# Patient Record
Sex: FEMALE | Race: WHITE | NOT HISPANIC OR LATINO | Employment: OTHER | URBAN - METROPOLITAN AREA
[De-identification: names, ages, dates, MRNs, and addresses within clinical notes are randomized per-mention and may not be internally consistent; named-entity substitution may affect disease eponyms.]

---

## 2018-08-21 ENCOUNTER — PROCEDURE VISIT (OUTPATIENT)
Dept: OBSTETRICS AND GYNECOLOGY | Facility: CLINIC | Age: 33
End: 2018-08-21

## 2018-08-21 ENCOUNTER — INITIAL PRENATAL (OUTPATIENT)
Dept: OBSTETRICS AND GYNECOLOGY | Facility: CLINIC | Age: 33
End: 2018-08-21

## 2018-08-21 VITALS
WEIGHT: 240 LBS | BODY MASS INDEX: 40.97 KG/M2 | HEIGHT: 64 IN | SYSTOLIC BLOOD PRESSURE: 173 MMHG | DIASTOLIC BLOOD PRESSURE: 108 MMHG

## 2018-08-21 DIAGNOSIS — R73.09 ELEVATED HEMOGLOBIN A1C: ICD-10-CM

## 2018-08-21 DIAGNOSIS — Z86.32 HISTORY OF GESTATIONAL DIABETES IN PRIOR PREGNANCY, CURRENTLY PREGNANT IN FIRST TRIMESTER: ICD-10-CM

## 2018-08-21 DIAGNOSIS — O10.919 CHRONIC HYPERTENSION IN PREGNANCY: ICD-10-CM

## 2018-08-21 DIAGNOSIS — Z11.3 SCREEN FOR STD (SEXUALLY TRANSMITTED DISEASE): ICD-10-CM

## 2018-08-21 DIAGNOSIS — Z34.91 PREGNANCY WITH UNCERTAIN DATES IN FIRST TRIMESTER: Primary | ICD-10-CM

## 2018-08-21 DIAGNOSIS — O34.219 PREVIOUS CESAREAN DELIVERY, ANTEPARTUM: ICD-10-CM

## 2018-08-21 DIAGNOSIS — O20.9 VAGINAL BLEEDING IN PREGNANCY, FIRST TRIMESTER: ICD-10-CM

## 2018-08-21 DIAGNOSIS — O09.291 HISTORY OF GESTATIONAL DIABETES IN PRIOR PREGNANCY, CURRENTLY PREGNANT IN FIRST TRIMESTER: ICD-10-CM

## 2018-08-21 DIAGNOSIS — Z13.71 SCREENING FOR GENETIC DISEASE CARRIER STATUS: ICD-10-CM

## 2018-08-21 DIAGNOSIS — O99.211 OBESITY AFFECTING PREGNANCY IN FIRST TRIMESTER: ICD-10-CM

## 2018-08-21 DIAGNOSIS — O24.011 PRE-EXISTING TYPE 1 DIABETES MELLITUS DURING PREGNANCY IN FIRST TRIMESTER: ICD-10-CM

## 2018-08-21 DIAGNOSIS — O99.210 OBESITY IN PREGNANCY: ICD-10-CM

## 2018-08-21 DIAGNOSIS — Z34.81 ENCOUNTER FOR SUPERVISION OF OTHER NORMAL PREGNANCY IN FIRST TRIMESTER: Primary | ICD-10-CM

## 2018-08-21 DIAGNOSIS — Z12.4 SCREENING FOR CERVICAL CANCER: ICD-10-CM

## 2018-08-21 PROCEDURE — 76817 TRANSVAGINAL US OBSTETRIC: CPT | Performed by: OBSTETRICS & GYNECOLOGY

## 2018-08-21 PROCEDURE — 99215 OFFICE O/P EST HI 40 MIN: CPT | Performed by: OBSTETRICS & GYNECOLOGY

## 2018-08-21 RX ORDER — NIFEDIPINE 60 MG/1
60 TABLET, EXTENDED RELEASE ORAL DAILY
Qty: 30 TABLET | Refills: 8 | Status: SHIPPED | OUTPATIENT
Start: 2018-08-21 | End: 2018-08-31 | Stop reason: SDUPTHER

## 2018-08-21 NOTE — PROGRESS NOTES
Initial ob visit     CC- Here for care of pregnancy     Fany Brunson is being seen today for her first obstetrical visit.  She is a 33 y.o.    7w2d gestation.     #: 1, Date: 06, Sex: Male, Weight: 4252 g (9 lb 6 oz), GA: 37w0d, Delivery: , Unspecified, Apgar1: None, Apgar5: None, Living: Living, Birth Comments: None    #: 2, Date: 08, Sex: Male, Weight: 4111 g (9 lb 1 oz), GA: 39w0d, Delivery: , Unspecified, Apgar1: None, Apgar5: None, Living: Living, Birth Comments: None    #: 3, Date: 09, Sex: Male, Weight: 3799 g (8 lb 6 oz), GA: 39w0d, Delivery: , Unspecified, Apgar1: None, Apgar5: None, Living: Living, Birth Comments: None    #: 4, Date: 11, Sex: Male, Weight: 3657 g (8 lb 1 oz), GA: 39w0d, Delivery: , Unspecified, Apgar1: None, Apgar5: None, Living: Living, Birth Comments: None    #: 5, Date: 10/15/15, Sex: Male, Weight: 3402 g (7 lb 8 oz), GA: 39w0d, Delivery: , Unspecified, Apgar1: None, Apgar5: None, Living: Living, Birth Comments: None    #: 6, Date: None, Sex: None, Weight: None, GA: None, Delivery: None, Apgar1: None, Apgar5: None, Living: None, Birth Comments: None      Current obstetric complaints : bleeding   Prior obstetric issues, potential pregnancy concerns:  x 5, HTN, GDM with last pregnancy  Family history of genetic issues (includes FOB): none  Prior infections concerning in pregnancy (Rash, fever in last 2 weeks): none  Varicella Hx -positive history  Prior testing for Cystic Fibrosis Carrier or Sickle Cell Trait- unknown status   Prepregnancy BMI - Body mass index is 41.2 kg/m².    Past Medical History:   Diagnosis Date   • Gestational diabetes    • Hypertension    • Varicella        Past Surgical History:   Procedure Laterality Date   •  SECTION     • CHOLECYSTECTOMY     • WRIST SURGERY         No current outpatient prescriptions on file.    Allergies   Allergen Reactions   • Latex Rash  "      Social History     Social History   • Marital status:      Spouse name: N/A   • Number of children: N/A   • Years of education: N/A     Occupational History   • Not on file.     Social History Main Topics   • Smoking status: Former Smoker     Types: Cigarettes   • Smokeless tobacco: Never Used   • Alcohol use No   • Drug use: No   • Sexual activity: Yes     Partners: Male     Other Topics Concern   • Not on file     Social History Narrative   • No narrative on file       Family History   Problem Relation Age of Onset   • Breast cancer Neg Hx    • Ovarian cancer Neg Hx    • Uterine cancer Neg Hx    • Colon cancer Neg Hx    • Deep vein thrombosis Neg Hx    • Pulmonary embolism Neg Hx        Review of systems     A comprehensive review of systems was negative.     Objective    BP (!) 173/108   Ht 162.6 cm (64\")   Wt 109 kg (240 lb)   LMP 06/23/2018 (Exact Date)   BMI 41.20 kg/m²       General Appearance:    Alert, cooperative, in no acute distress, habitus obese   Head:    Normocephalic, without obvious abnormality, atraumatic   Eyes:            Lids and lashes normal, conjunctivae and sclerae normal, no   icterus, no pallor, corneas clear   Ears:    Ears appear intact with no abnormalities noted       Neck:   No adenopathy, supple, trachea midline, no thyromegaly   Back:     No kyphosis present, no scoliosis present,                       Lungs:     Clear to auscultation,respirations regular, even and                   unlabored    Heart:    Regular rhythm and normal rate, normal S1 and S2, no            murmur, no gallop, no rub, no click   Breast Exam:    No masses, No nipple discharge   Abdomen:     Normal bowel sounds, no masses, no organomegaly, soft        non-tender, non-distended, no guarding, no rebound                 tenderness   Genitalia:    Vulva - BUS-WNL, NEFG    Vagina - No discharge, No bleeding    Cervix - No Lesions, closed     Uterus - Consistent with 8 weeks    Adnexa - No aristides, " NT    Pelvimetry - clinically adequate, gynecoid pelvis     Extremities:   Moves all extremities well, no edema, no cyanosis, no              redness   Pulses:   Pulses palpable and equal bilaterally   Skin:   No bleeding, bruising or rash   Lymph nodes:   No palpable adenopathy   Neurologic:   Sensation intact, A&O times 3      Assessment    1) Pregnancy at 8w3d  2) Hx of PIH -   See next   3) HTN, essential in pregnancy   Not on medication   Takes BP at home, has been up for awhile.   Check baseline levels of CMP/24 hour urine  Start anti-hypertensive medication   Procardia XL 60 mg per day (worked for her in the past)   Check BP and report if persistent > 150/95 to look at adjustment of dose/medications   Start ASA 81 mg 12-36 weeks   Good dating by today's scan in case need to intervene   4) Hx of Gest DM   Check Hg A1c, monitor   5) Prior    Repeat for delivery discussed  6) Obesity in pregnancy   Discussed weight gain of 10-15 # max (all in second half)   8) Vaginal bleeding in pregnancy   Rh+ (A+ in prior preg), normal ultrasound.   Bleeding precautions reviewed.        Plan    Initial labs drawn, GC/CHL screen done  Patient is on Prenatal vitamins  Problem list reviewed and updated.  Reviewed routine prenatal care with the office to include but not limited to   Zika (travel restrictions/ok to use insect repellant), not to changing cat litter, food restrictions, avoidance of alcohol, tobacco and drugs and saunas/hot tubs.   All questions answered.     Salvador Carson MD   2018  10:25 AM

## 2018-08-22 ENCOUNTER — TELEPHONE (OUTPATIENT)
Dept: OBSTETRICS AND GYNECOLOGY | Facility: CLINIC | Age: 33
End: 2018-08-22

## 2018-08-22 LAB
ABO GROUP BLD: (no result)
ALBUMIN SERPL-MCNC: 4.1 G/DL (ref 3.5–5.5)
ALBUMIN/GLOB SERPL: 1.5 {RATIO} (ref 1.2–2.2)
ALP SERPL-CCNC: 103 IU/L (ref 39–117)
ALT SERPL-CCNC: 11 IU/L (ref 0–32)
AST SERPL-CCNC: 12 IU/L (ref 0–40)
BASOPHILS # BLD AUTO: 0.1 X10E3/UL (ref 0–0.2)
BASOPHILS NFR BLD AUTO: 1 %
BILIRUB SERPL-MCNC: 0.2 MG/DL (ref 0–1.2)
BLD GP AB SCN SERPL QL: NEGATIVE
BUN SERPL-MCNC: 9 MG/DL (ref 6–20)
BUN/CREAT SERPL: 12 (ref 9–23)
CALCIUM SERPL-MCNC: 9.6 MG/DL (ref 8.7–10.2)
CHLORIDE SERPL-SCNC: 101 MMOL/L (ref 96–106)
CO2 SERPL-SCNC: 22 MMOL/L (ref 20–29)
CREAT SERPL-MCNC: 0.73 MG/DL (ref 0.57–1)
EOSINOPHIL # BLD AUTO: 0.3 X10E3/UL (ref 0–0.4)
EOSINOPHIL NFR BLD AUTO: 2 %
ERYTHROCYTE [DISTWIDTH] IN BLOOD BY AUTOMATED COUNT: 16.3 % (ref 12.3–15.4)
GLOBULIN SER CALC-MCNC: 2.7 G/DL (ref 1.5–4.5)
GLUCOSE SERPL-MCNC: 102 MG/DL (ref 65–99)
HBA1C MFR BLD: 5.7 % (ref 4.8–5.6)
HBV SURFACE AG SERPL QL IA: NEGATIVE
HCT VFR BLD AUTO: 41.6 % (ref 34–46.6)
HCV AB S/CO SERPL IA: <0.1 S/CO RATIO (ref 0–0.9)
HGB BLD-MCNC: 13.9 G/DL (ref 11.1–15.9)
HIV 1+2 AB+HIV1 P24 AG SERPL QL IA: NON REACTIVE
IMM GRANULOCYTES # BLD: 0 X10E3/UL (ref 0–0.1)
IMM GRANULOCYTES NFR BLD: 0 %
LYMPHOCYTES # BLD AUTO: 2.1 X10E3/UL (ref 0.7–3.1)
LYMPHOCYTES NFR BLD AUTO: 19 %
MCH RBC QN AUTO: 25.9 PG (ref 26.6–33)
MCHC RBC AUTO-ENTMCNC: 33.4 G/DL (ref 31.5–35.7)
MCV RBC AUTO: 78 FL (ref 79–97)
MONOCYTES # BLD AUTO: 0.5 X10E3/UL (ref 0.1–0.9)
MONOCYTES NFR BLD AUTO: 4 %
NEUTROPHILS # BLD AUTO: 8 X10E3/UL (ref 1.4–7)
NEUTROPHILS NFR BLD AUTO: 74 %
PLATELET # BLD AUTO: 290 X10E3/UL (ref 150–379)
POTASSIUM SERPL-SCNC: 3.9 MMOL/L (ref 3.5–5.2)
PROT SERPL-MCNC: 6.8 G/DL (ref 6–8.5)
RBC # BLD AUTO: 5.36 X10E6/UL (ref 3.77–5.28)
RH BLD: POSITIVE
RPR SER QL: NON REACTIVE
RUBV IGG SERPL IA-ACNC: 4.05 INDEX
SODIUM SERPL-SCNC: 142 MMOL/L (ref 134–144)
WBC # BLD AUTO: 10.9 X10E3/UL (ref 3.4–10.8)

## 2018-08-22 NOTE — TELEPHONE ENCOUNTER
----- Message from Salvador Carson MD sent at 8/22/2018 12:32 PM EDT -----  Hilary, I would go ahead and do an initial 3 hour since her random BS is elevated and HgA1c is above baseline to rule out active diabetes at this time. If normal will still have to do at 28 weeks. Thanks Dr. Carson

## 2018-08-23 ENCOUNTER — TELEPHONE (OUTPATIENT)
Dept: OBSTETRICS AND GYNECOLOGY | Facility: CLINIC | Age: 33
End: 2018-08-23

## 2018-08-23 LAB
BACTERIA UR CULT: ABNORMAL
BACTERIA UR CULT: ABNORMAL
CYTOLOGIST CVX/VAG CYTO: NORMAL
CYTOLOGY CVX/VAG DOC THIN PREP: NORMAL
DX ICD CODE: NORMAL
HIV 1 & 2 AB SER-IMP: NORMAL
HPV I/H RISK 1 DNA CVX QL PROBE+SIG AMP: NEGATIVE
OTHER ANTIBIOTIC SUSC ISLT: ABNORMAL
OTHER STN SPEC: NORMAL
PATH REPORT.FINAL DX SPEC: NORMAL
STAT OF ADQ CVX/VAG CYTO-IMP: NORMAL

## 2018-08-23 RX ORDER — NITROFURANTOIN 25; 75 MG/1; MG/1
100 CAPSULE ORAL 2 TIMES DAILY
Qty: 14 CAPSULE | Refills: 0 | Status: SHIPPED | OUTPATIENT
Start: 2018-08-23 | End: 2018-08-31 | Stop reason: SDUPTHER

## 2018-08-23 NOTE — TELEPHONE ENCOUNTER
----- Message from Salvador Carson MD sent at 8/23/2018  9:27 AM EDT -----  Hilary, she has UTI - I sent in macrobid to treat. Await sensitivities, but should start treatment now. Thanks Dr. Carson

## 2018-08-24 ENCOUNTER — TELEPHONE (OUTPATIENT)
Dept: OBSTETRICS AND GYNECOLOGY | Facility: CLINIC | Age: 33
End: 2018-08-24

## 2018-08-24 LAB
C TRACH RRNA SPEC QL NAA+PROBE: NEGATIVE
GLUCOSE 1H P 100 G GLC PO SERPL-MCNC: 199 MG/DL (ref 40–300)
GLUCOSE 2H P 100 G GLC PO SERPL-MCNC: 155 MG/DL (ref 40–300)
GLUCOSE 3H P 100 G GLC PO SERPL-MCNC: 188 MG/DL (ref 40–300)
GLUCOSE P FAST SERPL-MCNC: 102 MG/DL
HBA1C MFR BLD: 5.8 % (ref 4.8–5.6)
N GONORRHOEA RRNA SPEC QL NAA+PROBE: NEGATIVE
PROT 24H UR-MRATE: 308 MG/24HOURS (ref 0–150)
PROT UR-MCNC: 11 MG/DL
T VAGINALIS RRNA SPEC QL NAA+PROBE: POSITIVE

## 2018-08-24 RX ORDER — LANCETS 30 GAUGE
1 EACH MISCELLANEOUS 4 TIMES DAILY
Qty: 120 EACH | Refills: 3 | Status: SHIPPED | OUTPATIENT
Start: 2018-08-24 | End: 2018-12-02 | Stop reason: SDUPTHER

## 2018-08-24 RX ORDER — METRONIDAZOLE 500 MG/1
2000 TABLET ORAL ONCE
Qty: 4 TABLET | Refills: 1 | Status: SHIPPED | OUTPATIENT
Start: 2018-08-24 | End: 2018-08-24

## 2018-08-24 RX ORDER — GLUCOSAMINE HCL/CHONDROITIN SU 500-400 MG
1 CAPSULE ORAL 4 TIMES DAILY
Qty: 120 EACH | Refills: 3 | Status: SHIPPED | OUTPATIENT
Start: 2018-08-24 | End: 2018-12-02 | Stop reason: SDUPTHER

## 2018-08-24 RX ORDER — BLOOD-GLUCOSE METER
1 KIT MISCELLANEOUS 4 TIMES DAILY
Qty: 1 EACH | Refills: 0 | Status: SHIPPED | OUTPATIENT
Start: 2018-08-24 | End: 2019-03-28

## 2018-08-24 NOTE — TELEPHONE ENCOUNTER
Called and left message to return my call.   Several issues.   1) Trichomoniasis  2) UTI on Macrobid - is sensitive to this  3) Diabetic (failed 3 hour), need to start testing BS and get dietary counseling     -   Salvador Carson MD  8/24/2018  9:43 AM

## 2018-08-27 ENCOUNTER — TELEPHONE (OUTPATIENT)
Dept: OBSTETRICS AND GYNECOLOGY | Facility: CLINIC | Age: 33
End: 2018-08-27

## 2018-08-27 NOTE — TELEPHONE ENCOUNTER
----- Message from Salvador Carson MD sent at 8/24/2018 11:47 AM EDT -----  Hilary aware 1) trichomoniasis - will treat, needs to have partner treated. 2) UTI is sensitive to Macrobid 3) Abnormal 3 hour, start treatment as if diabetic. Supplies sent in, monitor BS fasting and 2 hours after meals, dietary education and follow up next week to look at initial results. Thanks Dr. Carson

## 2018-08-28 ENCOUNTER — HOSPITAL ENCOUNTER (OUTPATIENT)
Dept: DIABETES SERVICES | Facility: HOSPITAL | Age: 33
Discharge: HOME | End: 2018-08-28
Attending: OBSTETRICS & GYNECOLOGY | Admitting: OBSTETRICS & GYNECOLOGY

## 2018-08-28 LAB
CFTR MUT ANL BLD/T: NORMAL
LABORATORY COMMENT REPORT: NORMAL

## 2018-08-28 PROCEDURE — G0109 DIAB MANAGE TRN IND/GROUP: HCPCS

## 2018-08-30 LAB
ANNOTATION COMMENT IMP: NORMAL
ANNOTATION COMMENT IMP: NORMAL
CARRIER DETECTION RATE:: NORMAL
ETHNIC BACKGROUND STATED: NORMAL
GEN KNWLDGE REF ID: NORMAL
GENETIC COUNSELOR:: NORMAL
PATHOLOGIST NAME: NORMAL
REASON FOR REFERRAL (NARRATIVE): NORMAL
REF LAB TEST METHOD: NORMAL
SMN1 GENE MUT ANL BLD/T: NORMAL
SMN1 GENE MUT ANL BLD/T: NORMAL
SPEC CONTAINER SPEC: NORMAL
SPECIMEN SOURCE: NORMAL
TEST PERFORMANCE INFO SPEC: NORMAL

## 2018-08-31 ENCOUNTER — ROUTINE PRENATAL (OUTPATIENT)
Dept: OBSTETRICS AND GYNECOLOGY | Facility: CLINIC | Age: 33
End: 2018-08-31

## 2018-08-31 VITALS — WEIGHT: 239 LBS | DIASTOLIC BLOOD PRESSURE: 96 MMHG | BODY MASS INDEX: 41.02 KG/M2 | SYSTOLIC BLOOD PRESSURE: 149 MMHG

## 2018-08-31 DIAGNOSIS — O10.919 CHRONIC HYPERTENSION IN PREGNANCY: ICD-10-CM

## 2018-08-31 DIAGNOSIS — O23.41 UTI (URINARY TRACT INFECTION) DURING PREGNANCY, FIRST TRIMESTER: ICD-10-CM

## 2018-08-31 DIAGNOSIS — A59.01 TRICHOMONIASIS OF VAGINA: ICD-10-CM

## 2018-08-31 DIAGNOSIS — O09.91 HIGH-RISK PREGNANCY IN FIRST TRIMESTER: ICD-10-CM

## 2018-08-31 DIAGNOSIS — O24.311 PRE-EXISTING DIABETES MELLITUS DURING PREGNANCY IN FIRST TRIMESTER: Primary | ICD-10-CM

## 2018-08-31 PROCEDURE — 99214 OFFICE O/P EST MOD 30 MIN: CPT | Performed by: OBSTETRICS & GYNECOLOGY

## 2018-08-31 RX ORDER — NIFEDIPINE 90 MG/1
90 TABLET, EXTENDED RELEASE ORAL DAILY
Qty: 30 TABLET | Refills: 6 | Status: SHIPPED | OUTPATIENT
Start: 2018-08-31 | End: 2018-11-13

## 2018-08-31 RX ORDER — BLOOD-GLUCOSE METER
EACH MISCELLANEOUS
Refills: 0 | COMMUNITY
Start: 2018-08-24 | End: 2019-03-28

## 2018-08-31 RX ORDER — PNV NO.95/FERROUS FUM/FOLIC AC 28MG-0.8MG
1 TABLET ORAL DAILY
Refills: 11 | COMMUNITY
Start: 2018-08-21 | End: 2018-11-13

## 2018-08-31 RX ORDER — NAPROXEN SODIUM 220 MG
TABLET ORAL
Qty: 100 EACH | Refills: 5 | Status: SHIPPED | OUTPATIENT
Start: 2018-08-31 | End: 2019-02-11 | Stop reason: SDUPTHER

## 2018-08-31 RX ORDER — CHLORPHENIR/PHENYLEPH/ASPIRIN 2-7.8-325
TABLET, EFFERVESCENT ORAL
Refills: 3 | COMMUNITY
Start: 2018-08-24 | End: 2019-03-28

## 2018-09-18 ENCOUNTER — ROUTINE PRENATAL (OUTPATIENT)
Dept: OBSTETRICS AND GYNECOLOGY | Facility: CLINIC | Age: 33
End: 2018-09-18

## 2018-09-18 VITALS — SYSTOLIC BLOOD PRESSURE: 146 MMHG | DIASTOLIC BLOOD PRESSURE: 90 MMHG | WEIGHT: 239 LBS | BODY MASS INDEX: 41.02 KG/M2

## 2018-09-18 DIAGNOSIS — O09.91 HIGH-RISK PREGNANCY IN FIRST TRIMESTER: ICD-10-CM

## 2018-09-18 DIAGNOSIS — O24.311 PRE-EXISTING DIABETES MELLITUS DURING PREGNANCY IN FIRST TRIMESTER: Primary | ICD-10-CM

## 2018-09-18 DIAGNOSIS — O99.210 OBESITY IN PREGNANCY: ICD-10-CM

## 2018-09-18 DIAGNOSIS — O10.919 CHRONIC HYPERTENSION IN PREGNANCY: ICD-10-CM

## 2018-09-18 PROCEDURE — 99214 OFFICE O/P EST MOD 30 MIN: CPT | Performed by: OBSTETRICS & GYNECOLOGY

## 2018-09-18 RX ORDER — DEXTROSE 4 G
TABLET,CHEWABLE ORAL
Refills: 3 | COMMUNITY
Start: 2018-08-31 | End: 2019-03-24 | Stop reason: HOSPADM

## 2018-09-21 ENCOUNTER — TELEPHONE (OUTPATIENT)
Dept: DIABETES SERVICES | Facility: HOSPITAL | Age: 33
End: 2018-09-21

## 2018-09-21 NOTE — TELEPHONE ENCOUNTER
Speak to pt on phone to get 7 days of BG levels 9/15/18-9/21/18. Pt FBG . Pt skipped breakfast and post-breakfast BG check 5x/7. Post-lunch BGs: 109-151. Post-dinner BGs: .    Instruct pt about the rationale for eating a (small) breakfast. Request pt if she can come in the office for f/u ed. Pt is agreeable to come in on a day she has an outpt appt nearby (Tuesday.)     Insufficient data to titrate breakfast Humalog/Novolog. Only one post-lunch and two post-dinner elevation >130. Will have pt increase hs NPH 10% for >3 FBG elevations per Maternity Program Protocol (12 units to 13 units) and f/u with her next week.

## 2018-09-25 ENCOUNTER — HOSPITAL ENCOUNTER (OUTPATIENT)
Dept: DIABETES SERVICES | Facility: HOSPITAL | Age: 33
Discharge: HOME | End: 2018-09-25
Attending: OBSTETRICS & GYNECOLOGY | Admitting: OBSTETRICS & GYNECOLOGY

## 2018-09-25 PROCEDURE — G0108 DIAB MANAGE TRN  PER INDIV: HCPCS

## 2018-09-25 NOTE — NURSING NOTE
Diabetes Education  Assessment/Teaching    Patient Name:  Fany Brunson  YOB: 1985  MRN: [id number]  Admit Date:  9/25/2018      Assessment Date:  9/25/2018    Most Recent Value   General Information    Referral From:  MD sylvia Carson   Pregnancy Assessment                approx 12w   Diabetes History   Have you had diabetes education/teaching in the past?  yes   When and where was your diabetes education?  -- pt had initial ed for GDM/meal planning here at [facility].    Do you test your blood sugar at home?  yes   Frequency of checks  tid   Who performs the test?  pt   Have you had low blood sugar? (<70mg/dl)  no [none recorded since 9/15/18]   Have you had high blood sugar? (>140mg/dl)  yes   How often do you have high blood sugar?  occasionally   Education Preferences   Barriers to Learning  -- none observed this afternoon   Nutrition Information                       Review meal plan   Assessment Topics   Problem Solving - Assessment  Needs education/Review: hypoglycemia and approp tx.    Reducing Risk - Assessment  Needs education/Review.    Healthy Coping - Assessment  Competent   Monitoring - Assessment  Competent   DM Goals            Most Recent Value   DM Education Needs   Meter  Has own   Blood Glucose Target Range  -- Review: FBG target is <95, pp target is <120.   Medication  Insulin, Administration [remind pt the importance to rotate insulin injection sites. ]   Problem Solving  Hypoglycemia, Symptoms, Treatment   Healthy Eating  -- pt usually not eating breakfast;  encourage pt not to skip.    Healthy Coping  Appropriate   Gestational  Fetal complications   Discharge Plan  Home, Follow-up with MD   Motivation  Engaged   Teaching Method  Discussion, Handouts   Patient Response  Verbalized understanding            Other Comments:  Will continue to f/u pt on outpt basis.         Electronically signed by:  Cindy Cornell RN, BSN, CDE   09/25/18 5:11 PM

## 2018-09-26 ENCOUNTER — TELEPHONE (OUTPATIENT)
Dept: DIABETES SERVICES | Facility: HOSPITAL | Age: 33
End: 2018-09-26

## 2018-09-26 ENCOUNTER — TELEPHONE (OUTPATIENT)
Dept: OBSTETRICS AND GYNECOLOGY | Facility: CLINIC | Age: 33
End: 2018-09-26

## 2018-09-26 DIAGNOSIS — O24.311 PRE-EXISTING DIABETES MELLITUS DURING PREGNANCY IN FIRST TRIMESTER: ICD-10-CM

## 2018-09-26 NOTE — TELEPHONE ENCOUNTER
Discussed with Sammie    Ok to increase 4 units (> 10%)   Needs to eat better breakfast,   Will encourage as well at follow up visit.     Salvador Carson MD  9/26/2018  11:38 AM    PT IS IN OUTPATIENT RIGHT NOW, PT HAS 13 UNITS AT BEDTIME, SAMMIE WANTS TO KNOW IF SHE CAN GO UP 50%? FASTINGS ARE FROM . PLEASE CALL SAMMIE BACK ABOUT PT WHO IS IN OUTPATIENT RIGHT NOW.    [phone]  
1 person assist

## 2018-09-26 NOTE — TELEPHONE ENCOUNTER
Spoke w/Dr Carson this morning on phone re pt's elevated FBGs  last 7d. Receive telephone order from Dr Carson ok to increase pt's hs NPH from 13 to 17 units. Send pt a msg through [employer] to the effect to increase bedtime NPH to 17 units and increase dinner dose 10% (1 unit) per protocol for dinner BGs 3 >130. Will continue to f/u pt on outpt basis.

## 2018-10-01 ENCOUNTER — TELEPHONE (OUTPATIENT)
Dept: DIABETES SERVICES | Facility: HOSPITAL | Age: 33
End: 2018-10-01

## 2018-10-01 DIAGNOSIS — O24.311 PRE-EXISTING DIABETES MELLITUS DURING PREGNANCY IN FIRST TRIMESTER: ICD-10-CM

## 2018-10-01 NOTE — TELEPHONE ENCOUNTER
Call pt on phone to f/u on BGs. FBG and pp follow.  9/25/18- 118, -, 102, -  9/26/18- 101, 109, 159, 138.  9/27/18- 109, 129, -, 157.  9/28/18- 108, 156, -, 114.  9/29/18- 124, 148, 118, 122.  9/30/18- 88, 117, -, 114.  10/1/18- 94  Pt 4 days/7 skipped breakfast or lunch making titration of a.m. insulin doses difficult. Pt c/o feeling badly on days she eats 3 meals; encourage pt to try it a bit longer. Direct pt to increase hs NPH 2 units (constitutes 10%) for more than 3 FBGs >90 -per [facility] DM Mgt Program Protocol. Will continue to f/u pt on outpt basis.

## 2018-10-08 ENCOUNTER — TELEPHONE (OUTPATIENT)
Dept: DIABETES SERVICES | Facility: HOSPITAL | Age: 33
End: 2018-10-08

## 2018-10-08 DIAGNOSIS — O24.311 PRE-EXISTING DIABETES MELLITUS DURING PREGNANCY IN FIRST TRIMESTER: ICD-10-CM

## 2018-10-08 NOTE — TELEPHONE ENCOUNTER
Receive BG values from pt for last 7 days. Pt continues to skip lunch (and post-lunch BG.) Query pt if she can try a small snack rather than skipping the meal altogether. FBG and post-breakfast and post-dinner as follows:  10/1/18: 94, 112, -, 128.  10/2/18: 108, 78, -, 101.  10/3/18: 103, 102, -, 116.  10/4/18: 122, 133, -, 132.  10/5/18: 107, 183, -, 180.  10/6/18: 112, 193, -, 89.  10/7/18: 114, 127, -, 171.  10/8/18: 114.  Leave pt phone msg to increase hs NPH 2 units (up to 19 units,) increase a.m meal insulin to 17 units, and dinner insulin up to 12 units. This constitutes 10% change for 3 or more elevated BGs in each segment per the Maternity Protocol. Will message pt in [employer] to confirm. Will plan to continue to f/u on outpt basis.

## 2018-10-12 ENCOUNTER — TELEPHONE (OUTPATIENT)
Dept: DIABETES SERVICES | Facility: HOSPITAL | Age: 33
End: 2018-10-12

## 2018-10-15 ENCOUNTER — TELEPHONE (OUTPATIENT)
Dept: OBSTETRICS AND GYNECOLOGY | Facility: CLINIC | Age: 33
End: 2018-10-15

## 2018-10-15 NOTE — TELEPHONE ENCOUNTER
Hilary,     We need to see her this week and review.      Thanks   Dr. Carson    ----- Message from Cindy Cornell, RN sent at 10/15/2018  4:35 PM EDT -----  Regarding: touching base on Fany Brunson  Balaji Carson,  Fany and I spoke 10/8 and we titrated insulin on that date, however she did not return calls to the office later in the week for me to f/u.   Her fasting is high (103-122.) If someone is in target we might f/u about every 7 days, but sooner if a pt is not in target.   Perhaps she will tell you if phone calls from our office are ok, or if she'd just like follow up thru MyChart which we can also do.  Thank you!  Cindy

## 2018-10-16 ENCOUNTER — TELEPHONE (OUTPATIENT)
Dept: DIABETES SERVICES | Facility: HOSPITAL | Age: 33
End: 2018-10-16

## 2018-10-16 ENCOUNTER — ROUTINE PRENATAL (OUTPATIENT)
Dept: OBSTETRICS AND GYNECOLOGY | Facility: CLINIC | Age: 33
End: 2018-10-16

## 2018-10-16 VITALS — DIASTOLIC BLOOD PRESSURE: 90 MMHG | BODY MASS INDEX: 41.2 KG/M2 | SYSTOLIC BLOOD PRESSURE: 156 MMHG | WEIGHT: 240 LBS

## 2018-10-16 DIAGNOSIS — O99.210 OBESITY IN PREGNANCY: ICD-10-CM

## 2018-10-16 DIAGNOSIS — O10.919 CHRONIC HYPERTENSION IN PREGNANCY: ICD-10-CM

## 2018-10-16 DIAGNOSIS — O23.42 UTI IN PREGNANCY, ANTEPARTUM, SECOND TRIMESTER: ICD-10-CM

## 2018-10-16 DIAGNOSIS — O34.219 PREVIOUS CESAREAN DELIVERY, ANTEPARTUM: ICD-10-CM

## 2018-10-16 DIAGNOSIS — Z23 INFLUENZA VACCINE ADMINISTERED: Primary | ICD-10-CM

## 2018-10-16 DIAGNOSIS — O24.414 INSULIN CONTROLLED GESTATIONAL DIABETES MELLITUS (GDM) IN SECOND TRIMESTER: ICD-10-CM

## 2018-10-16 DIAGNOSIS — O09.92 HIGH-RISK PREGNANCY IN SECOND TRIMESTER: ICD-10-CM

## 2018-10-16 PROCEDURE — 99214 OFFICE O/P EST MOD 30 MIN: CPT | Performed by: OBSTETRICS & GYNECOLOGY

## 2018-10-16 RX ORDER — NITROFURANTOIN 25; 75 MG/1; MG/1
100 CAPSULE ORAL 2 TIMES DAILY
Qty: 14 CAPSULE | Refills: 0 | Status: SHIPPED | OUTPATIENT
Start: 2018-10-16 | End: 2018-10-23

## 2018-10-16 NOTE — PROGRESS NOTES
OB follow up     Fany Brunson is a 33 y.o.  15w2d being seen today for her obstetrical visit.  Patient reports no complaints. Fetal movement: absent.    Review of Systems  No bleeding, No cramping/contractions     /90   Wt 109 kg (240 lb)   LMP 2018 (Exact Date)   BMI 41.20 kg/m²     FHT: 147 BPM   Uterine Size: 14 cm       Assessment    1) pregnancy at 15w2d   Quickening   Anatomy scan   Down syndrome screening reviewed. Declined   2) UTI in pregnancy -+4 leuks, Nitrites pos  3) Flu vaccine given. No reaction noted   4) DM   Breakfast Humalog 17 units  Breakfast NPH 25 units   Dinner Humalog 12 units   Bedtime NPH 21 units   Changed last Monday,   Fasting    b'fast   Lunch 114-179  Dinner   Missed several due to  (and did not take night-time for several days)   Will have be more compliant and e-mail Cindy Thursday    5) HTN   On procardia XL at 90 mg, ASA - wants to wait on increased dose.   Reports home range is good 130/80's   Can only go to 60 2x per day or labetalol   6) Obesity and prior             Plan    Reviewed this stage of pregnancy  Problem list updated   Follow up in 4 weeks    Salvador Carson MD   10/16/2018  12:10 PM

## 2018-10-19 LAB
BACTERIA UR CULT: ABNORMAL
BACTERIA UR CULT: ABNORMAL
OTHER ANTIBIOTIC SUSC ISLT: ABNORMAL

## 2018-10-22 ENCOUNTER — TELEPHONE (OUTPATIENT)
Dept: OBSTETRICS AND GYNECOLOGY | Facility: CLINIC | Age: 33
End: 2018-10-22

## 2018-10-22 NOTE — TELEPHONE ENCOUNTER
----- Message from Hilary Reid MA sent at 10/22/2018  9:47 AM EDT -----  Teresa moore pt/les  ----- Message -----  From: Salvador Carson MD  Sent: 10/19/2018  11:58 AM  To: SOHAN Murphy, on Macrobid. So continue that as does have UTI. Thanks Dr. Carson

## 2018-10-23 ENCOUNTER — TELEPHONE (OUTPATIENT)
Dept: OBSTETRICS AND GYNECOLOGY | Facility: CLINIC | Age: 33
End: 2018-10-23

## 2018-10-23 ENCOUNTER — TELEPHONE (OUTPATIENT)
Dept: DIABETES SERVICES | Facility: HOSPITAL | Age: 33
End: 2018-10-23

## 2018-10-23 DIAGNOSIS — O24.311 PRE-EXISTING DIABETES MELLITUS DURING PREGNANCY IN FIRST TRIMESTER: ICD-10-CM

## 2018-10-23 NOTE — TELEPHONE ENCOUNTER
----- Message from Salvador Carson MD sent at 10/22/2018  5:48 PM EDT -----  Hilary, Sensitive to macrobid. Please let her know. Finish antibiotics. Thanks, Dr. Carson

## 2018-10-23 NOTE — TELEPHONE ENCOUNTER
Rec last 7days BG values from pt over phone.   10/16/18 FBG 90, 139, no lunch value, 147.  10/17/18: FBG 98, no brkfast value, 98, 182.  10/18/18: , -, -, 128 post-dinner.  10/19/18: , 128, no lunch value, 156.  10/20/18-, 102, no lunch value, 148.   10/21/18: , 97, no lunch value, 171.  10/22/18 FBG 99, no breakfast/BG, 122, 93.  Insufficient data to titrate a.m or breakfast doses. Speak w/Dr. Carson on phone re elevated FBGs and post-dinner BG values. T.o. from Dr. Carson ok to have pt increase hs NPH dose 5 units (up to 26 units) and dinner Humalog dose up 6 units (to 18 units.) Will msg pt through [employer] today per her request. Will cont to f/u pt on outpt basis.

## 2018-10-31 ENCOUNTER — TELEPHONE (OUTPATIENT)
Dept: DIABETES SERVICES | Facility: HOSPITAL | Age: 33
End: 2018-10-31

## 2018-11-01 ENCOUNTER — TELEPHONE (OUTPATIENT)
Dept: DIABETES SERVICES | Facility: HOSPITAL | Age: 33
End: 2018-11-01

## 2018-11-06 ENCOUNTER — TELEPHONE (OUTPATIENT)
Dept: DIABETES SERVICES | Facility: HOSPITAL | Age: 33
End: 2018-11-06

## 2018-11-06 NOTE — TELEPHONE ENCOUNTER
Speak w/pt on phone to assess last 7 days of BGs. FBG and 2 h pp are as follows:   10/30/18: 96, 101, -no lunch, 81.  10/31/18: 111 (pt states forgot hs NPH,) 133, -no lunch, 123.  11/1/18: 96, - no breakfast/BG, no lunch, 171.  11/2/18: 86, no breakfast/BG, 91, 111.  11/3/18: 108 (pt states forgot hs NPH,) no breakfast/BG, 80, 141 (pt states forgot dinner insulin.)  11/4/18: 81, no breakfast/BG, 90, 107.  11/5/18: 76, 115, 96, 90.  11/6/18: 94, no breakfast/BG, 140  Pt reports one episode of hypoglycemic sxs during the day. Review approp hypoglycemia tx and advise pt to carry source of CHO/carbs while away from home. Encourage pt again not to skip breakfast. Advise pt we should increase hs NPH 10% (of 26 units or 3 units) for 3 or more elevated FBGs but pt is hesitant citing concern for daytime low BG. Agree with pt to f/u in 2d and re-assess. This RN will make Dr Carson aware.

## 2018-11-08 ENCOUNTER — TELEPHONE (OUTPATIENT)
Dept: DIABETES SERVICES | Facility: HOSPITAL | Age: 33
End: 2018-11-08

## 2018-11-12 ENCOUNTER — TELEPHONE (OUTPATIENT)
Dept: DIABETES SERVICES | Facility: HOSPITAL | Age: 33
End: 2018-11-12

## 2018-11-13 ENCOUNTER — PROCEDURE VISIT (OUTPATIENT)
Dept: OBSTETRICS AND GYNECOLOGY | Facility: CLINIC | Age: 33
End: 2018-11-13

## 2018-11-13 ENCOUNTER — ROUTINE PRENATAL (OUTPATIENT)
Dept: OBSTETRICS AND GYNECOLOGY | Facility: CLINIC | Age: 33
End: 2018-11-13

## 2018-11-13 VITALS — DIASTOLIC BLOOD PRESSURE: 87 MMHG | SYSTOLIC BLOOD PRESSURE: 142 MMHG | BODY MASS INDEX: 40.68 KG/M2 | WEIGHT: 237 LBS

## 2018-11-13 DIAGNOSIS — O09.92 HIGH-RISK PREGNANCY IN SECOND TRIMESTER: ICD-10-CM

## 2018-11-13 DIAGNOSIS — O99.210 OBESITY DURING PREGNANCY: ICD-10-CM

## 2018-11-13 DIAGNOSIS — O34.219 PREVIOUS CESAREAN DELIVERY, ANTEPARTUM: ICD-10-CM

## 2018-11-13 DIAGNOSIS — O23.42 UTI IN PREGNANCY, ANTEPARTUM, SECOND TRIMESTER: Primary | ICD-10-CM

## 2018-11-13 DIAGNOSIS — O24.414 INSULIN CONTROLLED GESTATIONAL DIABETES MELLITUS (GDM) IN SECOND TRIMESTER: ICD-10-CM

## 2018-11-13 DIAGNOSIS — O99.210 OBESITY IN PREGNANCY: ICD-10-CM

## 2018-11-13 DIAGNOSIS — O10.919 CHRONIC HYPERTENSION IN PREGNANCY: ICD-10-CM

## 2018-11-13 DIAGNOSIS — Z36.89 ENCOUNTER FOR FETAL ANATOMIC SURVEY: Primary | ICD-10-CM

## 2018-11-13 PROCEDURE — 99214 OFFICE O/P EST MOD 30 MIN: CPT | Performed by: OBSTETRICS & GYNECOLOGY

## 2018-11-13 PROCEDURE — 76805 OB US >/= 14 WKS SNGL FETUS: CPT | Performed by: OBSTETRICS & GYNECOLOGY

## 2018-11-13 RX ORDER — VITAMIN A ACETATE, BETA CAROTENE, ASCORBIC ACID, CHOLECALCIFEROL, .ALPHA.-TOCOPHEROL ACETATE, DL-, THIAMINE MONONITRATE, RIBOFLAVIN, NIACINAMIDE, PYRIDOXINE HYDROCHLORIDE, FOLIC ACID, CYANOCOBALAMIN, CALCIUM CARBONATE, FERROUS FUMARATE, ZINC OXIDE, CUPRIC OXIDE 3080; 12; 120; 400; 1; 1.84; 3; 20; 22; 920; 25; 200; 27; 10; 2 [IU]/1; UG/1; MG/1; [IU]/1; MG/1; MG/1; MG/1; MG/1; MG/1; [IU]/1; MG/1; MG/1; MG/1; MG/1; MG/1
1 TABLET, FILM COATED ORAL
COMMUNITY
End: 2019-01-04

## 2018-11-13 RX ORDER — NITROFURANTOIN 25; 75 MG/1; MG/1
CAPSULE ORAL
Qty: 44 CAPSULE | Refills: 3 | Status: SHIPPED | OUTPATIENT
Start: 2018-11-13 | End: 2018-11-20

## 2018-11-13 RX ORDER — NIFEDIPINE 90 MG/1
90 TABLET, EXTENDED RELEASE ORAL
COMMUNITY
Start: 2015-10-19 | End: 2018-11-29

## 2018-11-13 RX ORDER — ASPIRIN 81 MG/1
TABLET, COATED ORAL DAILY
Refills: 10 | COMMUNITY
Start: 2018-10-18 | End: 2019-03-12

## 2018-11-13 NOTE — PROGRESS NOTES
OB follow up     Fany Brunson is a 33 y.o.  19w2d being seen today for her obstetrical visit.  Patient reports no complaints. Fetal movement: normal.    Review of Systems  No bleeding, No cramping/contractions     /87   Wt 108 kg (237 lb)   LMP 2018 (Exact Date)   BMI 40.68 kg/m²     FHT: 148 BPM   Uterine Size: 20 cm       Assessment    1) pregnancy at 19w2d   Anatomy   Transverse, no previa, Normal anatomy (need better cardiac)   2) UTI in pregnancy   Nitrites pos, +1 Leuks  Treated last time, consider suppression post treatment   3) DM   Fasting better, but still low 90's   2 hour post prandial better   NPH AM 25 units  Regular Am 17 units  Dinner Regular 18 units  Bedtime NPH is 26 units   Keeping up with maternity diabetes program   Stressed compliance with follow up and diet (especially breakfast)   4) HTN, chronic   Procardia 90 mg XL   ASA   Stable dose currently   5) Obesity and prior          Plan    Reviewed this stage of pregnancy  Problem list updated   Follow up in 2 weeks    Salvador Carson MD   2018  12:01 PM

## 2018-11-15 ENCOUNTER — TELEPHONE (OUTPATIENT)
Dept: DIABETES SERVICES | Facility: HOSPITAL | Age: 33
End: 2018-11-15

## 2018-11-15 DIAGNOSIS — O24.311 PRE-EXISTING DIABETES MELLITUS DURING PREGNANCY IN FIRST TRIMESTER: ICD-10-CM

## 2018-11-15 NOTE — TELEPHONE ENCOUNTER
Pt called back this RN to f/u on last 7d BGs. FBG and 2h pp as follows:   11/8/18: 92, -, -, 111.  11/9/18: 111, -, 121, 133.  11/10/18: 95, - no breakfast/BG, 97, 127.  11/11/18: 87, -, 75, 100.  11/12/18: 96, -, 111, 100.  11/13/18: 88, -, 124, 124.  11/14/18: 94, 81, 111, 116.  11/15/18: 100, 83  There is only one pp excursion >130.  Direct pt to increase hs NPH dose up 3 units (or 10% of 26) per [facility] DM Mgt Program Protocol for 3 or more high FBG >90. Pt verbalizes understanding. Will cont to f/u on outpt basis.

## 2018-11-18 LAB
BACTERIA UR CULT: ABNORMAL
BACTERIA UR CULT: ABNORMAL
OTHER ANTIBIOTIC SUSC ISLT: ABNORMAL

## 2018-11-26 ENCOUNTER — TELEPHONE (OUTPATIENT)
Dept: OBSTETRICS AND GYNECOLOGY | Facility: CLINIC | Age: 33
End: 2018-11-26

## 2018-11-26 ENCOUNTER — TELEPHONE (OUTPATIENT)
Dept: DIABETES SERVICES | Facility: HOSPITAL | Age: 33
End: 2018-11-26

## 2018-11-26 NOTE — TELEPHONE ENCOUNTER
----- Message from Hilary Reid MA sent at 11/16/2018  1:14 PM EST -----  Teresa moore pt/les  ----- Message -----  From: Salvador Carson MD  Sent: 11/16/2018   1:05 PM  To: SOHAN Murphy, She does have UTI. Awaiting ID and sensitivity.  Please let her know to keep on antibiotic. Thanks Dr. Carson

## 2018-11-28 ENCOUNTER — TELEPHONE (OUTPATIENT)
Dept: DIABETES SERVICES | Facility: HOSPITAL | Age: 33
End: 2018-11-28

## 2018-11-29 ENCOUNTER — ROUTINE PRENATAL (OUTPATIENT)
Dept: OBSTETRICS AND GYNECOLOGY | Facility: CLINIC | Age: 33
End: 2018-11-29

## 2018-11-29 ENCOUNTER — PROCEDURE VISIT (OUTPATIENT)
Dept: OBSTETRICS AND GYNECOLOGY | Facility: CLINIC | Age: 33
End: 2018-11-29

## 2018-11-29 VITALS — SYSTOLIC BLOOD PRESSURE: 150 MMHG | WEIGHT: 239 LBS | DIASTOLIC BLOOD PRESSURE: 87 MMHG | BODY MASS INDEX: 41.02 KG/M2

## 2018-11-29 DIAGNOSIS — O99.210 OBESITY DURING PREGNANCY: ICD-10-CM

## 2018-11-29 DIAGNOSIS — O24.414 INSULIN CONTROLLED GESTATIONAL DIABETES MELLITUS (GDM) IN SECOND TRIMESTER: ICD-10-CM

## 2018-11-29 DIAGNOSIS — O10.919 CHRONIC HYPERTENSION IN PREGNANCY: ICD-10-CM

## 2018-11-29 DIAGNOSIS — IMO0002 EVALUATE ANATOMY NOT SEEN ON PRIOR SONOGRAM: Primary | ICD-10-CM

## 2018-11-29 DIAGNOSIS — O34.219 PREVIOUS CESAREAN DELIVERY, ANTEPARTUM: ICD-10-CM

## 2018-11-29 DIAGNOSIS — O23.42 UTI IN PREGNANCY, ANTEPARTUM, SECOND TRIMESTER: ICD-10-CM

## 2018-11-29 DIAGNOSIS — O09.92 HIGH-RISK PREGNANCY IN SECOND TRIMESTER: Primary | ICD-10-CM

## 2018-11-29 PROCEDURE — 76816 OB US FOLLOW-UP PER FETUS: CPT | Performed by: OBSTETRICS & GYNECOLOGY

## 2018-11-29 PROCEDURE — 99213 OFFICE O/P EST LOW 20 MIN: CPT | Performed by: OBSTETRICS & GYNECOLOGY

## 2018-11-29 RX ORDER — NIFEDIPINE 90 MG/1
90 TABLET, EXTENDED RELEASE ORAL DAILY
COMMUNITY
Start: 2015-10-19 | End: 2019-02-19

## 2018-11-29 NOTE — PROGRESS NOTES
OB follow up      Fany Brunson is a 33 y.o.  21w4d being seen today for her obstetrical visit.  Patient reports no complaints. Fetal movement: normal.    Review of Systems  No bleeding, No cramping/contractions     /87   Wt 108 kg (239 lb)   LMP 2018 (Exact Date)   BMI 41.02 kg/m²     FHT: 146 BPM   Uterine Size: 22 cm       Assessment    1) pregnancy at 21w4d   2) UTI in pregnancy   Times two   Discussed suppression - on per patient (not in Epic?)     3) DM   Log reviewed  Much better control, check HgA1c   Ordered last time and not done     Insulin currently   AM - NPH 25  Breakfast Regular 17  Dinner Regular 18   Bedtime NPH 29     3) HTN   Stable on Procardia XL 90 mg and ASA     4) Obesity and prior          Plan    Reviewed this stage of pregnancy  Problem list updated   Follow up in 3 weeks    Salvador Carson MD   2018  2:50 PM

## 2018-11-30 LAB — HBA1C MFR BLD: 4.9 % (ref 4.8–5.6)

## 2018-12-03 RX ORDER — LANCETS
EACH MISCELLANEOUS
Qty: 120 EACH | Refills: 2 | Status: SHIPPED | OUTPATIENT
Start: 2018-12-03 | End: 2019-03-28

## 2018-12-03 RX ORDER — BLOOD SUGAR DIAGNOSTIC
STRIP MISCELLANEOUS
Qty: 120 EACH | Refills: 2 | Status: SHIPPED | OUTPATIENT
Start: 2018-12-03 | End: 2019-02-12 | Stop reason: SDUPTHER

## 2018-12-05 ENCOUNTER — TELEPHONE (OUTPATIENT)
Dept: DIABETES SERVICES | Facility: HOSPITAL | Age: 33
End: 2018-12-05

## 2018-12-10 ENCOUNTER — TELEPHONE (OUTPATIENT)
Dept: DIABETES SERVICES | Facility: HOSPITAL | Age: 33
End: 2018-12-10

## 2018-12-20 ENCOUNTER — ROUTINE PRENATAL (OUTPATIENT)
Dept: OBSTETRICS AND GYNECOLOGY | Facility: CLINIC | Age: 33
End: 2018-12-20

## 2018-12-20 VITALS — BODY MASS INDEX: 41.88 KG/M2 | SYSTOLIC BLOOD PRESSURE: 156 MMHG | DIASTOLIC BLOOD PRESSURE: 88 MMHG | WEIGHT: 244 LBS

## 2018-12-20 DIAGNOSIS — O10.919 CHRONIC HYPERTENSION IN PREGNANCY: ICD-10-CM

## 2018-12-20 DIAGNOSIS — O24.414 INSULIN CONTROLLED GESTATIONAL DIABETES MELLITUS (GDM) IN SECOND TRIMESTER: ICD-10-CM

## 2018-12-20 DIAGNOSIS — O99.210 OBESITY DURING PREGNANCY: ICD-10-CM

## 2018-12-20 DIAGNOSIS — O09.92 HIGH-RISK PREGNANCY IN SECOND TRIMESTER: Primary | ICD-10-CM

## 2018-12-20 DIAGNOSIS — O34.219 PREVIOUS CESAREAN DELIVERY, ANTEPARTUM: ICD-10-CM

## 2018-12-20 PROCEDURE — 99214 OFFICE O/P EST MOD 30 MIN: CPT | Performed by: OBSTETRICS & GYNECOLOGY

## 2018-12-20 NOTE — PROGRESS NOTES
OB follow up     Fany Brunson is a 33 y.o.  24w4d being seen today for her obstetrical visit.  Patient reports no complaints. Fetal movement: normal.    Review of Systems  No bleeding, No cramping/contractions     /88   Wt 111 kg (244 lb)   LMP 2018 (Exact Date)   BMI 41.88 kg/m²     FHT: 156 BPM   Uterine Size: 26 cm       Assessment    1) pregnancy at 24w4d   Rh+, no DM screen needed     2) DM    Fasting  (12/14 elevated)  b-Fast  (3/8 elevated)  Lunch  (3/6 elevated)   Dinner  (3/12 elevated)     Insulin   AM - NPH 25        - Regular 17   Dinner - Regular at 18   Bedtime NPH 29     Increase morning Regular to 19   Increase Bedtime NPH to 32     Stressed balanced diet all through day and sugar checks as scheduled!     hgA1c to 4.9% last visit - Excellent     3) HTN   Procardia XL 90 mg   ASA   Doing well.   Check growth with next visit     5) Obesity and prior      Plan    Reviewed this stage of pregnancy  Problem list updated   Follow up in 2 weeks    Salvador Carson MD   2018  1:52 PM

## 2018-12-24 ENCOUNTER — TELEPHONE (OUTPATIENT)
Dept: DIABETES SERVICES | Facility: HOSPITAL | Age: 33
End: 2018-12-24

## 2018-12-24 DIAGNOSIS — O24.311 PRE-EXISTING DIABETES MELLITUS DURING PREGNANCY IN FIRST TRIMESTER: ICD-10-CM

## 2018-12-24 NOTE — TELEPHONE ENCOUNTER
Call pt to f/u most recent BGs. FBG and 2h pp as follows:  12/17/18: 86, 92, - (no lunch BG,) 96.  12/18/18: 99, -, 147, 84.  12/19/18: 94, 82, (no lunch BG,) 89.  12/20/18: 96, 88, 83, 90.  12/21/18: 106, 100, 88, 110.  12/22/18: 89, 73, 77, 159.  12/23/18: 96, (no breakfast BG,) 102, 120.  All post-breakfast in target. Only one elevated post-lunch and post-dinner value. Instruct pt to increase hs NPH 3 units (or 10% of current 32 units for 3 or more elevated FBG >90) per [facility] DM Mgt Program Protocol. Pt v.u. Query pt if she is having hypoglycemia/sxs. Pt reports regularly having sxs but no recorded low BGs. Pt describes treating appropriately. Agree with pt to f/u in one week.

## 2018-12-31 ENCOUNTER — TELEPHONE (OUTPATIENT)
Dept: DIABETES SERVICES | Facility: HOSPITAL | Age: 33
End: 2018-12-31

## 2019-01-03 ENCOUNTER — TELEPHONE (OUTPATIENT)
Dept: DIABETES SERVICES | Facility: HOSPITAL | Age: 34
End: 2019-01-03

## 2019-01-04 ENCOUNTER — PROCEDURE VISIT (OUTPATIENT)
Dept: OBSTETRICS AND GYNECOLOGY | Facility: CLINIC | Age: 34
End: 2019-01-04

## 2019-01-04 ENCOUNTER — ROUTINE PRENATAL (OUTPATIENT)
Dept: OBSTETRICS AND GYNECOLOGY | Facility: CLINIC | Age: 34
End: 2019-01-04

## 2019-01-04 VITALS — BODY MASS INDEX: 41.71 KG/M2 | DIASTOLIC BLOOD PRESSURE: 85 MMHG | WEIGHT: 243 LBS | SYSTOLIC BLOOD PRESSURE: 155 MMHG

## 2019-01-04 DIAGNOSIS — O24.414 INSULIN CONTROLLED GESTATIONAL DIABETES MELLITUS (GDM) IN SECOND TRIMESTER: ICD-10-CM

## 2019-01-04 DIAGNOSIS — O99.210 OBESITY DURING PREGNANCY: ICD-10-CM

## 2019-01-04 DIAGNOSIS — Z36.89 ENCOUNTER FOR ULTRASOUND TO CHECK FETAL GROWTH: ICD-10-CM

## 2019-01-04 DIAGNOSIS — O34.219 PREVIOUS CESAREAN DELIVERY, ANTEPARTUM: ICD-10-CM

## 2019-01-04 DIAGNOSIS — O10.919 CHRONIC HYPERTENSION IN PREGNANCY: ICD-10-CM

## 2019-01-04 DIAGNOSIS — O10.919 HTN IN PREGNANCY, CHRONIC: ICD-10-CM

## 2019-01-04 DIAGNOSIS — O09.92 HIGH-RISK PREGNANCY IN SECOND TRIMESTER: Primary | ICD-10-CM

## 2019-01-04 PROCEDURE — 99214 OFFICE O/P EST MOD 30 MIN: CPT | Performed by: OBSTETRICS & GYNECOLOGY

## 2019-01-04 PROCEDURE — 76816 OB US FOLLOW-UP PER FETUS: CPT | Performed by: OBSTETRICS & GYNECOLOGY

## 2019-01-04 RX ORDER — NITROFURANTOIN 25; 75 MG/1; MG/1
CAPSULE ORAL
Refills: 3 | COMMUNITY
Start: 2018-12-17 | End: 2019-03-24 | Stop reason: HOSPADM

## 2019-01-04 RX ORDER — PNV NO.95/FERROUS FUM/FOLIC AC 28MG-0.8MG
1 TABLET ORAL DAILY
Refills: 11 | COMMUNITY
Start: 2018-12-17 | End: 2019-03-28

## 2019-01-04 NOTE — PROGRESS NOTES
OB follow up     Fany Brunson is a 33 y.o.  26w5d being seen today for her obstetrical visit.  Patient reports no complaints. Fetal movement: normal.    Review of Systems  No bleeding, No cramping/contractions     /85   Wt 110 kg (243 lb)   LMP 2018 (Exact Date)   BMI 41.71 kg/m²     FHT: 144 BPM   Uterine Size: 26 cm       Assessment    1) pregnancy at 26w5d   Rh+. Diabetic   Peds, prenatal classes and tours  TDaP and Flu vaccinations encouraged  Questions about L&D, anesthesia, birth control and breast feeding encouraged  2) Prior    Repeat at 38 weeks (DM-fair control), HTN as co-morbid issues   Encouraged tubal (but declines)   3) DM   Fasting 77-97 (since increase) - only 2 of 12 elevated  Breakfast 95 - 139   Lunch   Dinner  (7 elevated)     Currently  AM - NPH -25           Regular is 19  Dinner Regular 18   Bedtime NPH is 35     Only consideration would be increase in Dinner short acting, but will hold for now.     Growth 72%, A/C 87%, Normal SHANTANU and breech   Encouraged to follow up with YANIQUE/Cindy     4) HTN   BP borderline   Procardia XL 90 mg   ASA to continue   If need can do 60 mg BID next           Plan    Reviewed this stage of pregnancy  Problem list updated   Follow up in 2 weeks    Salvador Carson MD   2019  9:56 AM

## 2019-01-07 ENCOUNTER — TELEPHONE (OUTPATIENT)
Dept: DIABETES SERVICES | Facility: HOSPITAL | Age: 34
End: 2019-01-07

## 2019-01-14 ENCOUNTER — TELEPHONE (OUTPATIENT)
Dept: DIABETES SERVICES | Facility: HOSPITAL | Age: 34
End: 2019-01-14

## 2019-01-14 DIAGNOSIS — O24.311 PRE-EXISTING DIABETES MELLITUS DURING PREGNANCY IN FIRST TRIMESTER: ICD-10-CM

## 2019-01-17 ENCOUNTER — ROUTINE PRENATAL (OUTPATIENT)
Dept: OBSTETRICS AND GYNECOLOGY | Facility: CLINIC | Age: 34
End: 2019-01-17

## 2019-01-17 VITALS — BODY MASS INDEX: 42.4 KG/M2 | DIASTOLIC BLOOD PRESSURE: 87 MMHG | SYSTOLIC BLOOD PRESSURE: 139 MMHG | WEIGHT: 247 LBS

## 2019-01-17 DIAGNOSIS — O09.93 HIGH-RISK PREGNANCY IN THIRD TRIMESTER: Primary | ICD-10-CM

## 2019-01-17 DIAGNOSIS — O10.919 CHRONIC HYPERTENSION IN PREGNANCY: ICD-10-CM

## 2019-01-17 DIAGNOSIS — O24.414 INSULIN CONTROLLED GESTATIONAL DIABETES MELLITUS (GDM) IN THIRD TRIMESTER: ICD-10-CM

## 2019-01-17 DIAGNOSIS — O34.219 PREVIOUS CESAREAN DELIVERY, ANTEPARTUM: ICD-10-CM

## 2019-01-17 PROCEDURE — 99213 OFFICE O/P EST LOW 20 MIN: CPT | Performed by: OBSTETRICS & GYNECOLOGY

## 2019-01-17 NOTE — PROGRESS NOTES
OB follow up     Fany Brunson is a 34 y.o.  28w4d being seen today for her obstetrical visit.  Patient reports no complaints. Fetal movement: normal.    Review of Systems  No bleeding, No cramping/contractions     /87   Wt 112 kg (247 lb)   LMP 2018 (Exact Date)   BMI 42.40 kg/m²     FHT: 134 BPM   Uterine Size: 28 cm       Assessment    1) pregnancy at 28w4d   Rh+, Diabetic   2) DM   Log looks most under control   Does have occasional elevations, but Cindy at [facility] is following and adjusting BS   Current regimen   NPH - 28 morning   Regular -19 morning   Dinner regular 20   Bedtime NPH 35   3) HTN   Stable on procardia   On ASA   Continue current routine   4) Prior   Obesity - all stable         Plan    Reviewed this stage of pregnancy  Problem list updated   Follow up in 2 weeks    Salvador Carson MD   2019  4:01 PM

## 2019-01-22 ENCOUNTER — TELEPHONE (OUTPATIENT)
Dept: DIABETES SERVICES | Facility: HOSPITAL | Age: 34
End: 2019-01-22

## 2019-01-25 ENCOUNTER — TELEPHONE (OUTPATIENT)
Dept: DIABETES SERVICES | Facility: HOSPITAL | Age: 34
End: 2019-01-25

## 2019-01-25 NOTE — TELEPHONE ENCOUNTER
Balaji White,  sorry for the late message, for some unknown reason I'm just now getting a notification that you sent me a message.   My sugars since last Monday are  Monday fasting 86, lunch 119, dinner 123  Tuesday fasting 89, breakfast 79, lunch 128, dinner 76  Wednesday fasting 80, lunch 118, dinner 125  Thursday fasting 90, lunch 112, dinner 113  Friday fasting 95 (no nighttime) lunch 94, dinner 147  Saturday fasting  (did not check sick kid) lunch 96, dinner 138  Sunday fasting 89, lunch 83, dinner 114  Monday fasting 90, lunch 84 ( no insulin sick) dinner 100  Tuesday fasting 88, was sick so no meal but snacked, dinner 128  Wednesday fasting 71, breakfast 79, dinner 82  Thursday fasting 81, lunch 97 (no insulin)  not time for dinner check

## 2019-01-31 ENCOUNTER — ROUTINE PRENATAL (OUTPATIENT)
Dept: OBSTETRICS AND GYNECOLOGY | Facility: CLINIC | Age: 34
End: 2019-01-31

## 2019-01-31 VITALS — BODY MASS INDEX: 42.05 KG/M2 | WEIGHT: 245 LBS | SYSTOLIC BLOOD PRESSURE: 150 MMHG | DIASTOLIC BLOOD PRESSURE: 87 MMHG

## 2019-01-31 DIAGNOSIS — O34.219 PREVIOUS CESAREAN DELIVERY, ANTEPARTUM: ICD-10-CM

## 2019-01-31 DIAGNOSIS — O10.919 CHRONIC HYPERTENSION IN PREGNANCY: ICD-10-CM

## 2019-01-31 DIAGNOSIS — O24.414 INSULIN CONTROLLED GESTATIONAL DIABETES MELLITUS (GDM) IN THIRD TRIMESTER: ICD-10-CM

## 2019-01-31 DIAGNOSIS — O09.93 HIGH-RISK PREGNANCY IN THIRD TRIMESTER: Primary | ICD-10-CM

## 2019-01-31 PROCEDURE — 99213 OFFICE O/P EST LOW 20 MIN: CPT | Performed by: OBSTETRICS & GYNECOLOGY

## 2019-01-31 NOTE — PROGRESS NOTES
OB follow up     Fany Brunson is a 34 y.o.  30w4d being seen today for her obstetrical visit.  Patient reports no complaints. Fetal movement: normal.    Review of Systems  No bleeding, No cramping/contractions     /87   Wt 111 kg (245 lb)   LMP 2018 (Exact Date)   BMI 42.05 kg/m²     FHT: 144 BPM   Uterine Size: 30 cm       Assessment    1) pregnancy at 30w4d   2) DM - insulin   BS   Fasting - highest 93   Breakfast limited values all under 100   Lunch - all under 120  Dinner - a few up to 143, most in range   Regimen unchanged as far as doses     3) HTN   On ASA and procardia   Borderline BP today   Last growth at 27 weeks     4) Prior /obesity   Stable     Start BPP with growth in 2 weeks (HTN, DM)         Plan    Reviewed this stage of pregnancy  Problem list updated   Follow up in 2 weeks    Salvador Carson MD   2019  3:44 PM

## 2019-02-04 ENCOUNTER — TELEPHONE (OUTPATIENT)
Dept: DIABETES SERVICES | Facility: HOSPITAL | Age: 34
End: 2019-02-04

## 2019-02-04 NOTE — TELEPHONE ENCOUNTER
From: Fany ROSA Boy      Sent: 2/4/2019 12:33 PM EST        To: RON ASIF   Subject: RE:Checking in     Balaji White,         My last 7 days are   Monday fasting 83, lunch 115, dinner 94   Tuesday fasting 81, lunch 120, dinner 153   Wednesday fasting 93(no nighttime) lunch 135, dinner 119   Thursday fasting 84, lunch 112, dinner 138   Friday fasting 82, lunch 138, dinner 156 (no insulin)   Saturday fasting 91 (no nighttime) lunch 131, dinner 151   Sunday fasting 88. This was super bowl Sunday we snack a lot but no meals. Wasn't for sure how to do insulin or test.     Insufficient data to titrate breakfast dose.   Respond to pt via MyChart to increase her a.m NPH up 3 units (10% of 28 units for 3 or more elevated post-lunch) and increase her dinner/Humalog dose up 2 units to 22 (for 3 or more elevated post-dinner BG values.)

## 2019-02-11 DIAGNOSIS — O24.311 PRE-EXISTING DIABETES MELLITUS DURING PREGNANCY IN FIRST TRIMESTER: ICD-10-CM

## 2019-02-11 RX ORDER — NAPROXEN SODIUM 220 MG
TABLET ORAL
Qty: 10 EACH | Refills: 0 | Status: SHIPPED | OUTPATIENT
Start: 2019-02-11 | End: 2019-02-19 | Stop reason: SDUPTHER

## 2019-02-12 ENCOUNTER — TELEPHONE (OUTPATIENT)
Dept: DIABETES SERVICES | Facility: HOSPITAL | Age: 34
End: 2019-02-12

## 2019-02-12 RX ORDER — BLOOD SUGAR DIAGNOSTIC
STRIP MISCELLANEOUS
Qty: 120 EACH | Refills: 0 | Status: SHIPPED | OUTPATIENT
Start: 2019-02-12 | End: 2019-03-28

## 2019-02-12 NOTE — TELEPHONE ENCOUNTER
Pt sent most recent BGs via [employer] msg:  Fany Brunson Christina A. RN   Phone Number: [phone]             ----- Message from [employer], Generic sent at 2/12/2019 11:43 AM EST -----     Monday fasting 87, lunch 121, dinner 104   Tuesday fasting 89, lunch 86, dinner 138   Wednesday fasting 89, lunch 127, dinner 97   Thursday fasting 76, lunch 127, dinner 97   Friday fasting 77, lunch 160(no insulin) dinner 141 (no insulin)   Saturday fasting 82, lunch 134, dinner 104   Sunday fasting 82, lunch 125, dinner 134      Pt's FBGs in target. No post-breakfast BGs sent. Pt does not have 3 elevated post-lunch/post-dinner values except where scheduled insulin dose was omitted. Will cont to f/u with pt on outpt basis.

## 2019-02-19 ENCOUNTER — ROUTINE PRENATAL (OUTPATIENT)
Dept: OBSTETRICS AND GYNECOLOGY | Facility: CLINIC | Age: 34
End: 2019-02-19

## 2019-02-19 ENCOUNTER — PROCEDURE VISIT (OUTPATIENT)
Dept: OBSTETRICS AND GYNECOLOGY | Facility: CLINIC | Age: 34
End: 2019-02-19

## 2019-02-19 VITALS — BODY MASS INDEX: 42.4 KG/M2 | WEIGHT: 247 LBS | DIASTOLIC BLOOD PRESSURE: 91 MMHG | SYSTOLIC BLOOD PRESSURE: 161 MMHG

## 2019-02-19 DIAGNOSIS — O36.60X0 EXCESSIVE FETAL GROWTH AFFECTING MANAGEMENT OF PREGNANCY, ANTEPARTUM, SINGLE OR UNSPECIFIED FETUS: ICD-10-CM

## 2019-02-19 DIAGNOSIS — O10.919 CHRONIC HYPERTENSION IN PREGNANCY: ICD-10-CM

## 2019-02-19 DIAGNOSIS — O34.219 PREVIOUS CESAREAN DELIVERY, ANTEPARTUM: ICD-10-CM

## 2019-02-19 DIAGNOSIS — O99.210 OBESITY DURING PREGNANCY: Primary | ICD-10-CM

## 2019-02-19 DIAGNOSIS — O24.414 INSULIN CONTROLLED GESTATIONAL DIABETES MELLITUS (GDM) IN THIRD TRIMESTER: ICD-10-CM

## 2019-02-19 DIAGNOSIS — O24.311 PRE-EXISTING DIABETES MELLITUS DURING PREGNANCY IN FIRST TRIMESTER: ICD-10-CM

## 2019-02-19 DIAGNOSIS — O09.93 HIGH-RISK PREGNANCY IN THIRD TRIMESTER: Primary | ICD-10-CM

## 2019-02-19 PROCEDURE — 76819 FETAL BIOPHYS PROFIL W/O NST: CPT | Performed by: OBSTETRICS & GYNECOLOGY

## 2019-02-19 PROCEDURE — 99214 OFFICE O/P EST MOD 30 MIN: CPT | Performed by: OBSTETRICS & GYNECOLOGY

## 2019-02-19 PROCEDURE — 76816 OB US FOLLOW-UP PER FETUS: CPT | Performed by: OBSTETRICS & GYNECOLOGY

## 2019-02-19 RX ORDER — NAPROXEN SODIUM 220 MG
TABLET ORAL
Qty: 120 EACH | Refills: 1 | Status: SHIPPED | OUTPATIENT
Start: 2019-02-19 | End: 2019-03-28

## 2019-02-19 RX ORDER — NIFEDIPINE 60 MG/1
60 TABLET, FILM COATED, EXTENDED RELEASE ORAL 2 TIMES DAILY
Qty: 120 TABLET | Refills: 2 | Status: ON HOLD | OUTPATIENT
Start: 2019-02-19 | End: 2019-03-24 | Stop reason: SDUPTHER

## 2019-02-19 NOTE — PROGRESS NOTES
OB follow up     Fany Brunson is a 34 y.o.  33w2d being seen today for her obstetrical visit.  Patient reports pressure. Fetal movement: normal.    Review of Systems  No bleeding, No cramping/contractions     /91   Wt 112 kg (247 lb)   LMP 2018 (Exact Date)   BMI 42.40 kg/m²     FHT: 136 BPM   Uterine Size: 34 cm       Assessment    1) pregnancy at 33w2d   2) DM on insulin   Fasting - All less than 90  Breakfast Only one 125  Lunch High 160 (no insulin?)   Dinner Most in range  Growth 87%, A/C >99%, SHANTANU 14.8 cm, BPP 8/8, vertex   3) HTN, on procardia and ASA  BP borderline elevated   To 60 Mg BID   4) Prior        Plan    Reviewed this stage of pregnancy  Problem list updated   Follow up in 1 weeks    Salvador Carson MD   2019  1:36 PM

## 2019-02-20 ENCOUNTER — TELEPHONE (OUTPATIENT)
Dept: DIABETES SERVICES | Facility: HOSPITAL | Age: 34
End: 2019-02-20

## 2019-02-20 LAB — HBA1C MFR BLD: 5.1 % (ref 4.8–5.6)

## 2019-02-20 NOTE — TELEPHONE ENCOUNTER
Fany Brunson To  Cindy Cornell, RN Sent  2/20/2019 10:06 AM   ----- Message from [employer], Generic sent at 2/20/2019 10:06 AM EST -----     Dr. Carson said they were looking good.   Monday fasting 89, breakfast 90, lunch 113, dinner 91   Tuesday fasting 89, lunch 118, dinner 103   Wednesday fasting 75, breakfast 115, lunch 102, dinner 133   Thursday fasting 88, breakfast 87, dinner 126   Friday fasting 80, breakfast 125, dinner 133   Saturday fasting 86, breakfast 95, lunch 105, dinner 99   Sunday fasting 81, breakfast 118, dinner 86     Pt sent most recent week of BGs per request of this RN thru [employer]. FBGs are in target. There are not 3 or more post-meal elevations (within same meal.) No titration today per [facility] DM Mgt Program Protocol. Will cont to f/u with pt.

## 2019-02-26 ENCOUNTER — TELEPHONE (OUTPATIENT)
Dept: DIABETES SERVICES | Facility: HOSPITAL | Age: 34
End: 2019-02-26

## 2019-02-26 ENCOUNTER — ROUTINE PRENATAL (OUTPATIENT)
Dept: OBSTETRICS AND GYNECOLOGY | Facility: CLINIC | Age: 34
End: 2019-02-26

## 2019-02-26 ENCOUNTER — PROCEDURE VISIT (OUTPATIENT)
Dept: OBSTETRICS AND GYNECOLOGY | Facility: CLINIC | Age: 34
End: 2019-02-26

## 2019-02-26 VITALS — BODY MASS INDEX: 42.57 KG/M2 | DIASTOLIC BLOOD PRESSURE: 91 MMHG | SYSTOLIC BLOOD PRESSURE: 157 MMHG | WEIGHT: 248 LBS

## 2019-02-26 DIAGNOSIS — O24.414 INSULIN CONTROLLED GESTATIONAL DIABETES MELLITUS (GDM) IN THIRD TRIMESTER: Primary | ICD-10-CM

## 2019-02-26 DIAGNOSIS — O10.919 CHRONIC HYPERTENSION IN PREGNANCY: ICD-10-CM

## 2019-02-26 DIAGNOSIS — O24.311 PRE-EXISTING DIABETES MELLITUS DURING PREGNANCY IN FIRST TRIMESTER: ICD-10-CM

## 2019-02-26 DIAGNOSIS — O09.93 HIGH-RISK PREGNANCY IN THIRD TRIMESTER: Primary | ICD-10-CM

## 2019-02-26 DIAGNOSIS — O34.219 PREVIOUS CESAREAN DELIVERY, ANTEPARTUM: ICD-10-CM

## 2019-02-26 PROCEDURE — 99213 OFFICE O/P EST LOW 20 MIN: CPT | Performed by: OBSTETRICS & GYNECOLOGY

## 2019-02-26 NOTE — PROGRESS NOTES
OB follow up     Fany Brunson is a 34 y.o.  34w2d being seen today for her obstetrical visit.  Patient reports no complaints. Fetal movement: normal.    Review of Systems  No bleeding, No cramping/contractions     /91   Wt 112 kg (248 lb)   LMP 2018 (Exact Date)   BMI 42.57 kg/m²     FHT: 156 BPM   Uterine Size: 34 cm       Assessment    1) pregnancy at 34w2d   2) DM on insulin   Fasting under control   All rest decent range,   No breakfast still.   BPP 8/8, vertex and normal SHANTANU   HgA1c -5/1% last week   3) HTN   On procardia XL at 60 mg BID   Borderline BP - stable overall.   4) Prior  for repeat         Plan    Reviewed this stage of pregnancy  Problem list updated   Follow up in 1 weeks    Salvador Carson MD   2019  3:46 PM

## 2019-02-27 ENCOUNTER — TELEPHONE (OUTPATIENT)
Dept: DIABETES SERVICES | Facility: HOSPITAL | Age: 34
End: 2019-02-27

## 2019-02-27 NOTE — TELEPHONE ENCOUNTER
Msg from pt from [employer].   Fany Doranis To  Cindy Cornell RN Sent  2/26/2019  5:09 PM   ----- Message from [employer], Generic sent at 2/26/2019  5:09 PM EST -----     Hey sorry I didn't get back with you yesterday. I've had a couple of emergencies I've had to deal with. My sugars since last Monday were   Monday fasting 80, snacked, dinner 107   Tuesday fasting 86, lunch 141(no insulin), dinner 138(cake son's bday)   Wednesday fasting 89, breakfast 126, lunch 129, dinner 153   Thursday fasting 80, lunch 117, dinner 114   Friday fasting 86, lunch 120, dinner 110   Saturday fasting 85, lunch 104, dinner 109   Sunday fasting 109, lunch 134, dinner 121     Pt has just one FBG elevation. Insufficient data for breakfast to titrate. Pt has two post-lunch elevation-one of which pt indicates is secondary to no insulin. Pt has two post-dinner elevations. No indication today for titration w/o 3 elevations in one (meal) segment. Will cont to f/u with pt on outpt basis.

## 2019-03-05 ENCOUNTER — ROUTINE PRENATAL (OUTPATIENT)
Dept: OBSTETRICS AND GYNECOLOGY | Facility: CLINIC | Age: 34
End: 2019-03-05

## 2019-03-05 ENCOUNTER — PROCEDURE VISIT (OUTPATIENT)
Dept: OBSTETRICS AND GYNECOLOGY | Facility: CLINIC | Age: 34
End: 2019-03-05

## 2019-03-05 VITALS — WEIGHT: 253 LBS | DIASTOLIC BLOOD PRESSURE: 93 MMHG | SYSTOLIC BLOOD PRESSURE: 162 MMHG | BODY MASS INDEX: 43.43 KG/M2

## 2019-03-05 DIAGNOSIS — O24.414 INSULIN CONTROLLED GESTATIONAL DIABETES MELLITUS (GDM) IN THIRD TRIMESTER: Primary | ICD-10-CM

## 2019-03-05 DIAGNOSIS — O24.414 INSULIN CONTROLLED GESTATIONAL DIABETES MELLITUS (GDM) IN THIRD TRIMESTER: ICD-10-CM

## 2019-03-05 DIAGNOSIS — O34.219 PREVIOUS CESAREAN DELIVERY, ANTEPARTUM: ICD-10-CM

## 2019-03-05 DIAGNOSIS — Z36.9 ANTENATAL SCREENING ENCOUNTER: ICD-10-CM

## 2019-03-05 DIAGNOSIS — O09.93 HIGH-RISK PREGNANCY IN THIRD TRIMESTER: Primary | ICD-10-CM

## 2019-03-05 DIAGNOSIS — O24.311 PRE-EXISTING DIABETES MELLITUS DURING PREGNANCY IN FIRST TRIMESTER: ICD-10-CM

## 2019-03-05 PROCEDURE — 99214 OFFICE O/P EST MOD 30 MIN: CPT | Performed by: OBSTETRICS & GYNECOLOGY

## 2019-03-05 PROCEDURE — 76819 FETAL BIOPHYS PROFIL W/O NST: CPT | Performed by: OBSTETRICS & GYNECOLOGY

## 2019-03-05 NOTE — PROGRESS NOTES
Ob follow up    Fany Brunson is a 34 y.o.  35w2d patient being seen today for her obstetrical visit. Patient reports headache since yesterday.. Fetal movement: normal.      ROS - Denies leaking fluid, vaginal bleeding and notes good fetal movement.     /93   Wt 115 kg (253 lb)   LMP 2018 (Exact Date)   BMI 43.43 kg/m²     FHT:  138 BPM    Uterine Size: 36 cm   Presentations: cephalic   Pelvic Exam:     Dilation: Closed    Effacement: Long    Station:  -2                 Assessment    1) Pregnancy at 35w2d  2) Fetal status reassuring   3) GBS status - done today   4) DM on insulin   Fasting all 90 or less, Only a hand full of postprandial are > 120, one > 140   BPP 8/8, Normal SHANTANU   5) Chronic HTN   On Procardia 60 mg BID   Stable BP but at upper limit can tolerate   Stop ASA   6) Prior    Repeat 3/25 at 38 weeks     Plan    Labor warnings   C BID        Salvador Carson MD   3/5/2019  9:43 AM

## 2019-03-07 LAB — GP B STREP DNA SPEC QL NAA+PROBE: NEGATIVE

## 2019-03-08 ENCOUNTER — TELEPHONE (OUTPATIENT)
Dept: DIABETES SERVICES | Facility: HOSPITAL | Age: 34
End: 2019-03-08

## 2019-03-08 NOTE — TELEPHONE ENCOUNTER
Fany Brunson To  Cindy Cornell, RN Sent  3/8/2019  3:35 PM   ----- Message from [employer], Generic sent at 3/8/2019  3:35 PM EST -----     dinesh White,   my blood sugars for last week were   Monday fasting 79, breakfast 154(no insulin), lunch 122, dinner 124   Tuesday fasting 87, lunch 104, dinner 114   Wednesday fasting 90, breakfast 105, lunch 122, dinner 126   Thursday fasting 82, breakfast 105, lunch 100, dinner 120   Friday fasting 89, breakfast 110, lunch 121, dinner 118   Saturday fasting 90, lunch 108, dinner 126   Sunday fasting 88, lunch 142, dinner 130     Pt's FBG are in target. One post-breakfast elevation. One post-lunch elevation >130. Only one post-dinner 130 or higher. No titration today per [facility] DM Mgt Program Protocol. Will msg pt back in [employer] and f/u in ~1 week.

## 2019-03-12 ENCOUNTER — PROCEDURE VISIT (OUTPATIENT)
Dept: OBSTETRICS AND GYNECOLOGY | Facility: CLINIC | Age: 34
End: 2019-03-12

## 2019-03-12 ENCOUNTER — ROUTINE PRENATAL (OUTPATIENT)
Dept: OBSTETRICS AND GYNECOLOGY | Facility: CLINIC | Age: 34
End: 2019-03-12

## 2019-03-12 VITALS — SYSTOLIC BLOOD PRESSURE: 154 MMHG | BODY MASS INDEX: 43.43 KG/M2 | WEIGHT: 253 LBS | DIASTOLIC BLOOD PRESSURE: 97 MMHG

## 2019-03-12 DIAGNOSIS — O24.414 INSULIN CONTROLLED GESTATIONAL DIABETES MELLITUS (GDM) IN THIRD TRIMESTER: ICD-10-CM

## 2019-03-12 DIAGNOSIS — O24.414 INSULIN CONTROLLED GESTATIONAL DIABETES MELLITUS (GDM) IN THIRD TRIMESTER: Primary | ICD-10-CM

## 2019-03-12 DIAGNOSIS — O09.93 HIGH-RISK PREGNANCY IN THIRD TRIMESTER: Primary | ICD-10-CM

## 2019-03-12 DIAGNOSIS — O34.219 PREVIOUS CESAREAN DELIVERY, ANTEPARTUM: ICD-10-CM

## 2019-03-12 DIAGNOSIS — O10.919 CHRONIC HYPERTENSION IN PREGNANCY: ICD-10-CM

## 2019-03-12 DIAGNOSIS — O99.210 OBESITY IN PREGNANCY: ICD-10-CM

## 2019-03-12 PROCEDURE — 76819 FETAL BIOPHYS PROFIL W/O NST: CPT | Performed by: OBSTETRICS & GYNECOLOGY

## 2019-03-12 PROCEDURE — 99213 OFFICE O/P EST LOW 20 MIN: CPT | Performed by: OBSTETRICS & GYNECOLOGY

## 2019-03-12 NOTE — PROGRESS NOTES
Ob follow up    Fany Brunson is a 34 y.o.  36w2d patient being seen today for her obstetrical visit. Patient reports no complaints. Fetal movement: normal.      ROS - Denies leaking fluid, vaginal bleeding and notes good fetal movement.     /97   Wt 115 kg (253 lb)   LMP 2018 (Exact Date)   BMI 43.43 kg/m²     FHT:  144 BPM    Uterine Size: 36 cm   Presentations: cephalic   Pelvic Exam:     Dilation: Closed    Effacement: Long    Station:  -2                 Assessment    1) Pregnancy at 36w2d  2) Fetal status reassuring   3) GBS status - negative   4) DM on insulin   Log reviewed and no adjustments needed   5) HTN, on procardia   Stopped ASA   BP continues to be mildly elevated above our desired parameters   BPP 8/8, Normal SHANTANU  Cephalic   6) Prior    Currently on for 3/25- Need to reassess again next week to determine need to move up delivery due to inability to control her BP     Plan    Labor warnings   [facility] BID        Salvador Carson MD   3/12/2019  11:51 AM

## 2019-03-19 ENCOUNTER — PROCEDURE VISIT (OUTPATIENT)
Dept: OBSTETRICS AND GYNECOLOGY | Facility: CLINIC | Age: 34
End: 2019-03-19

## 2019-03-19 ENCOUNTER — ROUTINE PRENATAL (OUTPATIENT)
Dept: OBSTETRICS AND GYNECOLOGY | Facility: CLINIC | Age: 34
End: 2019-03-19

## 2019-03-19 VITALS — WEIGHT: 256 LBS | SYSTOLIC BLOOD PRESSURE: 158 MMHG | DIASTOLIC BLOOD PRESSURE: 96 MMHG | BODY MASS INDEX: 43.94 KG/M2

## 2019-03-19 DIAGNOSIS — O34.219 PREVIOUS CESAREAN DELIVERY, ANTEPARTUM: ICD-10-CM

## 2019-03-19 DIAGNOSIS — O10.919 CHRONIC HYPERTENSION IN PREGNANCY: ICD-10-CM

## 2019-03-19 DIAGNOSIS — O09.93 HIGH-RISK PREGNANCY IN THIRD TRIMESTER: Primary | ICD-10-CM

## 2019-03-19 DIAGNOSIS — O99.210 OBESITY DURING PREGNANCY: ICD-10-CM

## 2019-03-19 DIAGNOSIS — O24.414 INSULIN CONTROLLED GESTATIONAL DIABETES MELLITUS (GDM) IN THIRD TRIMESTER: ICD-10-CM

## 2019-03-19 DIAGNOSIS — O24.414 INSULIN CONTROLLED GESTATIONAL DIABETES MELLITUS (GDM) IN THIRD TRIMESTER: Primary | ICD-10-CM

## 2019-03-19 PROCEDURE — 76816 OB US FOLLOW-UP PER FETUS: CPT | Performed by: OBSTETRICS & GYNECOLOGY

## 2019-03-19 PROCEDURE — 99214 OFFICE O/P EST MOD 30 MIN: CPT | Performed by: OBSTETRICS & GYNECOLOGY

## 2019-03-19 PROCEDURE — 76819 FETAL BIOPHYS PROFIL W/O NST: CPT | Performed by: OBSTETRICS & GYNECOLOGY

## 2019-03-19 RX ORDER — ACYCLOVIR 400 MG/1
400 TABLET ORAL 2 TIMES DAILY
Qty: 60 TABLET | Refills: 0 | Status: SHIPPED | OUTPATIENT
Start: 2019-03-19 | End: 2019-03-24 | Stop reason: HOSPADM

## 2019-03-19 NOTE — PROGRESS NOTES
Ob follow up    Fany Brunson is a 34 y.o.  37w2d patient being seen today for her obstetrical visit. Patient reports no complaints. Fetal movement: normal.      ROS - Denies leaking fluid, vaginal bleeding and notes good fetal movement.     /96   Wt 116 kg (256 lb)   LMP 2018 (Exact Date)   BMI 43.94 kg/m²     FHT:  145 BPM    Uterine Size: 37 cm   Presentations: cephalic   Pelvic Exam:     Dilation: Closed    Effacement: Long    Station:  -2                 Assessment    1) Pregnancy at 37w2d  2) Fetal status reassuring   3) GBS status - negative   4) Gestational DM  Good control this last week.   Pre op instructions reviewed.   5) HTN   On Procardia with still significant elevations   Move up delivery to this week.   DTRs 2+ - No clonus   No PIH symptoms   6) Prior    Repeat Thursday at Noon now.   Pre op instructions     Plan    Labor warnings   [facility] BID        Salvador Carson MD   3/19/2019  12:12 PM

## 2019-03-21 ENCOUNTER — ANESTHESIA EVENT (OUTPATIENT)
Dept: LABOR AND DELIVERY | Facility: HOSPITAL | Age: 34
End: 2019-03-21

## 2019-03-21 ENCOUNTER — ANESTHESIA (OUTPATIENT)
Dept: LABOR AND DELIVERY | Facility: HOSPITAL | Age: 34
End: 2019-03-21

## 2019-03-21 ENCOUNTER — HOSPITAL ENCOUNTER (INPATIENT)
Facility: HOSPITAL | Age: 34
LOS: 3 days | Discharge: HOME | End: 2019-03-24
Attending: OBSTETRICS & GYNECOLOGY | Admitting: OBSTETRICS & GYNECOLOGY

## 2019-03-21 PROBLEM — O09.93 HIGH-RISK PREGNANCY IN THIRD TRIMESTER: Status: ACTIVE | Noted: 2019-01-04

## 2019-03-21 PROBLEM — O10.913 CHRONIC HYPERTENSION WITH EXACERBATION DURING PREGNANCY IN THIRD TRIMESTER: Status: ACTIVE | Noted: 2019-01-04

## 2019-03-21 PROBLEM — Z98.891 S/P REPEAT LOW TRANSVERSE C-SECTION: Status: ACTIVE | Noted: 2019-03-21

## 2019-03-21 PROBLEM — Z34.90 PREGNANCY: Status: ACTIVE | Noted: 2019-03-21

## 2019-03-21 LAB
ABO GROUP BLD: NORMAL
ALBUMIN SERPL-MCNC: 3.3 G/DL (ref 3.5–5.2)
ALBUMIN/GLOB SERPL: 1 G/DL
ALP SERPL-CCNC: 143 U/L (ref 39–117)
ALT SERPL W P-5'-P-CCNC: 11 U/L (ref 1–33)
ANION GAP SERPL CALCULATED.3IONS-SCNC: 16.2 MMOL/L
AST SERPL-CCNC: 13 U/L (ref 1–32)
BASOPHILS # BLD AUTO: 0.03 10*3/MM3 (ref 0–0.2)
BASOPHILS NFR BLD AUTO: 0.3 % (ref 0–1.5)
BILIRUB SERPL-MCNC: 0.2 MG/DL (ref 0.2–1.2)
BLD GP AB SCN SERPL QL: NEGATIVE
BUN BLD-MCNC: 8 MG/DL (ref 6–20)
BUN/CREAT SERPL: 15.1 (ref 7–25)
CALCIUM SPEC-SCNC: 9.1 MG/DL (ref 8.6–10.5)
CHLORIDE SERPL-SCNC: 106 MMOL/L (ref 98–107)
CO2 SERPL-SCNC: 16.8 MMOL/L (ref 22–29)
CREAT BLD-MCNC: 0.53 MG/DL (ref 0.57–1)
DEPRECATED RDW RBC AUTO: 43 FL (ref 37–54)
EOSINOPHIL # BLD AUTO: 0.28 10*3/MM3 (ref 0–0.4)
EOSINOPHIL NFR BLD AUTO: 2.7 % (ref 0.3–6.2)
ERYTHROCYTE [DISTWIDTH] IN BLOOD BY AUTOMATED COUNT: 13.8 % (ref 12.3–15.4)
GFR SERPL CREATININE-BSD FRML MDRD: 132 ML/MIN/1.73
GLOBULIN UR ELPH-MCNC: 3.4 GM/DL
GLUCOSE BLD-MCNC: 88 MG/DL (ref 65–99)
GLUCOSE BLDC GLUCOMTR-MCNC: 127 MG/DL (ref 70–130)
HCT VFR BLD AUTO: 37.5 % (ref 34–46.6)
HGB BLD-MCNC: 12.6 G/DL (ref 12–15.9)
IMM GRANULOCYTES # BLD AUTO: 0.04 10*3/MM3 (ref 0–0.05)
IMM GRANULOCYTES NFR BLD AUTO: 0.4 % (ref 0–0.5)
LYMPHOCYTES # BLD AUTO: 2.38 10*3/MM3 (ref 0.7–3.1)
LYMPHOCYTES NFR BLD AUTO: 23.3 % (ref 19.6–45.3)
MCH RBC QN AUTO: 28.8 PG (ref 26.6–33)
MCHC RBC AUTO-ENTMCNC: 33.6 G/DL (ref 31.5–35.7)
MCV RBC AUTO: 85.6 FL (ref 79–97)
MONOCYTES # BLD AUTO: 0.61 10*3/MM3 (ref 0.1–0.9)
MONOCYTES NFR BLD AUTO: 6 % (ref 5–12)
NEUTROPHILS # BLD AUTO: 6.86 10*3/MM3 (ref 1.4–7)
NEUTROPHILS NFR BLD AUTO: 67.3 % (ref 42.7–76)
NRBC BLD AUTO-RTO: 0 /100 WBC (ref 0–0)
PLATELET # BLD AUTO: 241 10*3/MM3 (ref 140–450)
PMV BLD AUTO: 11.7 FL (ref 6–12)
POTASSIUM BLD-SCNC: 3.3 MMOL/L (ref 3.5–5.2)
PROT SERPL-MCNC: 6.7 G/DL (ref 6–8.5)
RBC # BLD AUTO: 4.38 10*6/MM3 (ref 3.77–5.28)
RH BLD: POSITIVE
SODIUM BLD-SCNC: 139 MMOL/L (ref 136–145)
T&S EXPIRATION DATE: NORMAL
WBC NRBC COR # BLD: 10.2 10*3/MM3 (ref 3.4–10.8)

## 2019-03-21 PROCEDURE — 80053 COMPREHEN METABOLIC PANEL: CPT | Performed by: OBSTETRICS & GYNECOLOGY

## 2019-03-21 PROCEDURE — 88307 TISSUE EXAM BY PATHOLOGIST: CPT

## 2019-03-21 PROCEDURE — 25010000002 PHENYLEPHRINE PER 1 ML: Performed by: NURSE ANESTHETIST, CERTIFIED REGISTERED

## 2019-03-21 PROCEDURE — 59515 CESAREAN DELIVERY: CPT | Performed by: OBSTETRICS & GYNECOLOGY

## 2019-03-21 PROCEDURE — 86900 BLOOD TYPING SEROLOGIC ABO: CPT | Performed by: OBSTETRICS & GYNECOLOGY

## 2019-03-21 PROCEDURE — 25010000002 MORPHINE PER 10 MG: Performed by: NURSE ANESTHETIST, CERTIFIED REGISTERED

## 2019-03-21 PROCEDURE — 25010000002 FENTANYL CITRATE (PF) 100 MCG/2ML SOLUTION: Performed by: ANESTHESIOLOGY

## 2019-03-21 PROCEDURE — 25010000002 MORPHINE PER 10 MG: Performed by: ANESTHESIOLOGY

## 2019-03-21 PROCEDURE — 25010000003 CEFAZOLIN IN DEXTROSE 2-4 GM/100ML-% SOLUTION: Performed by: OBSTETRICS & GYNECOLOGY

## 2019-03-21 PROCEDURE — 82962 GLUCOSE BLOOD TEST: CPT

## 2019-03-21 PROCEDURE — 85025 COMPLETE CBC W/AUTO DIFF WBC: CPT | Performed by: OBSTETRICS & GYNECOLOGY

## 2019-03-21 PROCEDURE — 86850 RBC ANTIBODY SCREEN: CPT | Performed by: OBSTETRICS & GYNECOLOGY

## 2019-03-21 PROCEDURE — 25010000002 DIPHENHYDRAMINE PER 50 MG: Performed by: NURSE ANESTHETIST, CERTIFIED REGISTERED

## 2019-03-21 PROCEDURE — 25010000002 ONDANSETRON PER 1 MG: Performed by: ANESTHESIOLOGY

## 2019-03-21 PROCEDURE — 25010000002 METOCLOPRAMIDE PER 10 MG: Performed by: ANESTHESIOLOGY

## 2019-03-21 PROCEDURE — 86901 BLOOD TYPING SEROLOGIC RH(D): CPT | Performed by: OBSTETRICS & GYNECOLOGY

## 2019-03-21 RX ORDER — CEFAZOLIN SODIUM 2 G/100ML
2 INJECTION, SOLUTION INTRAVENOUS ONCE
Status: COMPLETED | OUTPATIENT
Start: 2019-03-21 | End: 2019-03-21

## 2019-03-21 RX ORDER — BUPIVACAINE HYDROCHLORIDE 7.5 MG/ML
INJECTION, SOLUTION INTRASPINAL
Status: DISPENSED
Start: 2019-03-21 | End: 2019-03-22

## 2019-03-21 RX ORDER — NIFEDIPINE 60 MG/1
60 TABLET, EXTENDED RELEASE ORAL 2 TIMES DAILY
Status: DISCONTINUED | OUTPATIENT
Start: 2019-03-21 | End: 2019-03-24 | Stop reason: HOSPADM

## 2019-03-21 RX ORDER — CARBOPROST TROMETHAMINE 250 UG/ML
250 INJECTION, SOLUTION INTRAMUSCULAR AS NEEDED
Status: DISCONTINUED | OUTPATIENT
Start: 2019-03-21 | End: 2019-03-21 | Stop reason: HOSPADM

## 2019-03-21 RX ORDER — PRENATAL VIT NO.126/IRON/FOLIC 28MG-0.8MG
1 TABLET ORAL DAILY
Status: DISCONTINUED | OUTPATIENT
Start: 2019-03-21 | End: 2019-03-24 | Stop reason: HOSPADM

## 2019-03-21 RX ORDER — PROMETHAZINE HYDROCHLORIDE 25 MG/ML
12.5 INJECTION, SOLUTION INTRAMUSCULAR; INTRAVENOUS EVERY 6 HOURS PRN
Status: DISCONTINUED | OUTPATIENT
Start: 2019-03-21 | End: 2019-03-24 | Stop reason: HOSPADM

## 2019-03-21 RX ORDER — MORPHINE SULFATE 2 MG/ML
2 INJECTION, SOLUTION INTRAMUSCULAR; INTRAVENOUS
Status: DISCONTINUED | OUTPATIENT
Start: 2019-03-21 | End: 2019-03-21

## 2019-03-21 RX ORDER — ACETAMINOPHEN 500 MG
1000 TABLET ORAL ONCE
Status: DISCONTINUED | OUTPATIENT
Start: 2019-03-21 | End: 2019-03-21 | Stop reason: HOSPADM

## 2019-03-21 RX ORDER — OXYTOCIN-SODIUM CHLORIDE 0.9% IV SOLN 30 UNIT/500ML 30-0.9/5 UT/ML-%
125 SOLUTION INTRAVENOUS CONTINUOUS PRN
Status: COMPLETED | OUTPATIENT
Start: 2019-03-21 | End: 2019-03-21

## 2019-03-21 RX ORDER — SODIUM CHLORIDE 0.9 % (FLUSH) 0.9 %
3-10 SYRINGE (ML) INJECTION AS NEEDED
Status: DISCONTINUED | OUTPATIENT
Start: 2019-03-21 | End: 2019-03-21 | Stop reason: HOSPADM

## 2019-03-21 RX ORDER — SIMETHICONE 80 MG
80 TABLET,CHEWABLE ORAL 4 TIMES DAILY PRN
Status: DISCONTINUED | OUTPATIENT
Start: 2019-03-21 | End: 2019-03-24 | Stop reason: HOSPADM

## 2019-03-21 RX ORDER — IBUPROFEN 800 MG/1
800 TABLET ORAL EVERY 8 HOURS PRN
Status: DISCONTINUED | OUTPATIENT
Start: 2019-03-21 | End: 2019-03-24 | Stop reason: HOSPADM

## 2019-03-21 RX ORDER — MORPHINE SULFATE 1 MG/ML
INJECTION, SOLUTION EPIDURAL; INTRATHECAL; INTRAVENOUS
Status: COMPLETED
Start: 2019-03-21 | End: 2019-03-21

## 2019-03-21 RX ORDER — ONDANSETRON 2 MG/ML
4 INJECTION INTRAMUSCULAR; INTRAVENOUS ONCE AS NEEDED
Status: DISCONTINUED | OUTPATIENT
Start: 2019-03-21 | End: 2019-03-21

## 2019-03-21 RX ORDER — HYDROXYZINE 50 MG/1
50 TABLET, FILM COATED ORAL EVERY 6 HOURS PRN
Status: DISCONTINUED | OUTPATIENT
Start: 2019-03-21 | End: 2019-03-24 | Stop reason: HOSPADM

## 2019-03-21 RX ORDER — SODIUM CHLORIDE, SODIUM LACTATE, POTASSIUM CHLORIDE, CALCIUM CHLORIDE 600; 310; 30; 20 MG/100ML; MG/100ML; MG/100ML; MG/100ML
125 INJECTION, SOLUTION INTRAVENOUS CONTINUOUS
Status: DISCONTINUED | OUTPATIENT
Start: 2019-03-21 | End: 2019-03-21

## 2019-03-21 RX ORDER — FENTANYL CITRATE 50 UG/ML
INJECTION, SOLUTION INTRAMUSCULAR; INTRAVENOUS
Status: COMPLETED
Start: 2019-03-21 | End: 2019-03-21

## 2019-03-21 RX ORDER — BUPIVACAINE HYDROCHLORIDE 7.5 MG/ML
INJECTION, SOLUTION EPIDURAL; RETROBULBAR
Status: COMPLETED | OUTPATIENT
Start: 2019-03-21 | End: 2019-03-21

## 2019-03-21 RX ORDER — PHYTONADIONE 2 MG/ML
INJECTION, EMULSION INTRAMUSCULAR; INTRAVENOUS; SUBCUTANEOUS
Status: DISPENSED
Start: 2019-03-21 | End: 2019-03-21

## 2019-03-21 RX ORDER — LIDOCAINE HYDROCHLORIDE 10 MG/ML
5 INJECTION, SOLUTION EPIDURAL; INFILTRATION; INTRACAUDAL; PERINEURAL AS NEEDED
Status: DISCONTINUED | OUTPATIENT
Start: 2019-03-21 | End: 2019-03-21 | Stop reason: HOSPADM

## 2019-03-21 RX ORDER — SODIUM CHLORIDE 0.9 % (FLUSH) 0.9 %
3 SYRINGE (ML) INJECTION EVERY 12 HOURS SCHEDULED
Status: DISCONTINUED | OUTPATIENT
Start: 2019-03-21 | End: 2019-03-21 | Stop reason: HOSPADM

## 2019-03-21 RX ORDER — ACYCLOVIR 400 MG/1
400 TABLET ORAL 2 TIMES DAILY
Status: DISCONTINUED | OUTPATIENT
Start: 2019-03-21 | End: 2019-03-24 | Stop reason: HOSPADM

## 2019-03-21 RX ORDER — DOCUSATE SODIUM 100 MG/1
100 CAPSULE, LIQUID FILLED ORAL 2 TIMES DAILY PRN
Status: DISCONTINUED | OUTPATIENT
Start: 2019-03-21 | End: 2019-03-24 | Stop reason: HOSPADM

## 2019-03-21 RX ORDER — METOCLOPRAMIDE HYDROCHLORIDE 5 MG/ML
10 INJECTION INTRAMUSCULAR; INTRAVENOUS ONCE AS NEEDED
Status: COMPLETED | OUTPATIENT
Start: 2019-03-21 | End: 2019-03-21

## 2019-03-21 RX ORDER — DIPHENHYDRAMINE HCL 25 MG
25 CAPSULE ORAL EVERY 4 HOURS PRN
Status: DISCONTINUED | OUTPATIENT
Start: 2019-03-21 | End: 2019-03-21

## 2019-03-21 RX ORDER — FAMOTIDINE 10 MG/ML
20 INJECTION, SOLUTION INTRAVENOUS ONCE AS NEEDED
Status: COMPLETED | OUTPATIENT
Start: 2019-03-21 | End: 2019-03-21

## 2019-03-21 RX ORDER — DIPHENHYDRAMINE HYDROCHLORIDE 50 MG/ML
25 INJECTION INTRAMUSCULAR; INTRAVENOUS EVERY 4 HOURS PRN
Status: DISCONTINUED | OUTPATIENT
Start: 2019-03-21 | End: 2019-03-21

## 2019-03-21 RX ORDER — ASPIRIN 81 MG/1
81 TABLET, CHEWABLE ORAL DAILY
COMMUNITY
End: 2019-03-24 | Stop reason: HOSPADM

## 2019-03-21 RX ORDER — PROMETHAZINE HYDROCHLORIDE 25 MG/1
12.5 SUPPOSITORY RECTAL EVERY 6 HOURS PRN
Status: DISCONTINUED | OUTPATIENT
Start: 2019-03-21 | End: 2019-03-24 | Stop reason: HOSPADM

## 2019-03-21 RX ORDER — FENTANYL CITRATE 50 UG/ML
INJECTION, SOLUTION INTRAMUSCULAR; INTRAVENOUS
Status: COMPLETED | OUTPATIENT
Start: 2019-03-21 | End: 2019-03-21

## 2019-03-21 RX ORDER — HYDROMORPHONE HYDROCHLORIDE 1 MG/ML
0.5 INJECTION, SOLUTION INTRAMUSCULAR; INTRAVENOUS; SUBCUTANEOUS
Status: DISCONTINUED | OUTPATIENT
Start: 2019-03-21 | End: 2019-03-21

## 2019-03-21 RX ORDER — FAMOTIDINE 10 MG/ML
20 INJECTION, SOLUTION INTRAVENOUS ONCE AS NEEDED
Status: DISCONTINUED | OUTPATIENT
Start: 2019-03-21 | End: 2019-03-21 | Stop reason: HOSPADM

## 2019-03-21 RX ORDER — OXYTOCIN-SODIUM CHLORIDE 0.9% IV SOLN 30 UNIT/500ML 30-0.9/5 UT/ML-%
250 SOLUTION INTRAVENOUS CONTINUOUS
Status: ACTIVE | OUTPATIENT
Start: 2019-03-21 | End: 2019-03-21

## 2019-03-21 RX ORDER — MISOPROSTOL 200 UG/1
800 TABLET ORAL AS NEEDED
Status: DISCONTINUED | OUTPATIENT
Start: 2019-03-21 | End: 2019-03-24 | Stop reason: HOSPADM

## 2019-03-21 RX ORDER — ONDANSETRON 2 MG/ML
4 INJECTION INTRAMUSCULAR; INTRAVENOUS ONCE AS NEEDED
Status: DISCONTINUED | OUTPATIENT
Start: 2019-03-21 | End: 2019-03-21 | Stop reason: HOSPADM

## 2019-03-21 RX ORDER — ERYTHROMYCIN 5 MG/G
OINTMENT OPHTHALMIC
Status: DISPENSED
Start: 2019-03-21 | End: 2019-03-21

## 2019-03-21 RX ORDER — ACETAMINOPHEN 500 MG
1000 TABLET ORAL ONCE
Status: COMPLETED | OUTPATIENT
Start: 2019-03-21 | End: 2019-03-21

## 2019-03-21 RX ORDER — MISOPROSTOL 200 UG/1
800 TABLET ORAL AS NEEDED
Status: DISCONTINUED | OUTPATIENT
Start: 2019-03-21 | End: 2019-03-21 | Stop reason: HOSPADM

## 2019-03-21 RX ORDER — PROMETHAZINE HYDROCHLORIDE 25 MG/1
25 TABLET ORAL EVERY 6 HOURS PRN
Status: DISCONTINUED | OUTPATIENT
Start: 2019-03-21 | End: 2019-03-24 | Stop reason: HOSPADM

## 2019-03-21 RX ORDER — METHYLERGONOVINE MALEATE 0.2 MG/ML
200 INJECTION INTRAVENOUS ONCE AS NEEDED
Status: DISCONTINUED | OUTPATIENT
Start: 2019-03-21 | End: 2019-03-21 | Stop reason: HOSPADM

## 2019-03-21 RX ORDER — OXYTOCIN-SODIUM CHLORIDE 0.9% IV SOLN 30 UNIT/500ML 30-0.9/5 UT/ML-%
999 SOLUTION INTRAVENOUS ONCE
Status: COMPLETED | OUTPATIENT
Start: 2019-03-21 | End: 2019-03-21

## 2019-03-21 RX ORDER — SODIUM CHLORIDE, SODIUM LACTATE, POTASSIUM CHLORIDE, CALCIUM CHLORIDE 600; 310; 30; 20 MG/100ML; MG/100ML; MG/100ML; MG/100ML
125 INJECTION, SOLUTION INTRAVENOUS CONTINUOUS
Status: DISCONTINUED | OUTPATIENT
Start: 2019-03-21 | End: 2019-03-24 | Stop reason: HOSPADM

## 2019-03-21 RX ORDER — NALOXONE HCL 0.4 MG/ML
0.2 VIAL (ML) INJECTION
Status: DISCONTINUED | OUTPATIENT
Start: 2019-03-21 | End: 2019-03-21

## 2019-03-21 RX ORDER — DIPHENHYDRAMINE HYDROCHLORIDE 50 MG/ML
INJECTION INTRAMUSCULAR; INTRAVENOUS AS NEEDED
Status: DISCONTINUED | OUTPATIENT
Start: 2019-03-21 | End: 2019-03-21 | Stop reason: SURG

## 2019-03-21 RX ORDER — OXYCODONE HYDROCHLORIDE AND ACETAMINOPHEN 5; 325 MG/1; MG/1
2 TABLET ORAL EVERY 6 HOURS PRN
Status: DISCONTINUED | OUTPATIENT
Start: 2019-03-21 | End: 2019-03-24 | Stop reason: HOSPADM

## 2019-03-21 RX ORDER — MORPHINE SULFATE 1 MG/ML
INJECTION, SOLUTION EPIDURAL; INTRATHECAL; INTRAVENOUS
Status: COMPLETED | OUTPATIENT
Start: 2019-03-21 | End: 2019-03-21

## 2019-03-21 RX ORDER — ONDANSETRON 2 MG/ML
4 INJECTION INTRAMUSCULAR; INTRAVENOUS ONCE AS NEEDED
Status: COMPLETED | OUTPATIENT
Start: 2019-03-21 | End: 2019-03-21

## 2019-03-21 RX ORDER — RANITIDINE HCL 75 MG
75 TABLET ORAL 2 TIMES DAILY
COMMUNITY
End: 2019-03-28

## 2019-03-21 RX ORDER — ZOLPIDEM TARTRATE 5 MG/1
5 TABLET ORAL NIGHTLY PRN
Status: DISCONTINUED | OUTPATIENT
Start: 2019-03-21 | End: 2019-03-24 | Stop reason: HOSPADM

## 2019-03-21 RX ADMIN — PHENYLEPHRINE HYDROCHLORIDE 100 MCG: 10 INJECTION INTRAVENOUS at 13:56

## 2019-03-21 RX ADMIN — OXYTOCIN 999 ML/HR: 10 INJECTION INTRAVENOUS at 13:11

## 2019-03-21 RX ADMIN — PHENYLEPHRINE HYDROCHLORIDE 100 MCG: 10 INJECTION INTRAVENOUS at 13:32

## 2019-03-21 RX ADMIN — PHENYLEPHRINE HYDROCHLORIDE 100 MCG: 10 INJECTION INTRAVENOUS at 13:07

## 2019-03-21 RX ADMIN — ACETAMINOPHEN 1000 MG: 500 TABLET, FILM COATED ORAL at 12:17

## 2019-03-21 RX ADMIN — MORPHINE SULFATE 2 MG: 2 INJECTION, SOLUTION INTRAMUSCULAR; INTRAVENOUS at 14:43

## 2019-03-21 RX ADMIN — SODIUM CHLORIDE, POTASSIUM CHLORIDE, SODIUM LACTATE AND CALCIUM CHLORIDE 125 ML/HR: 600; 310; 30; 20 INJECTION, SOLUTION INTRAVENOUS at 11:17

## 2019-03-21 RX ADMIN — NIFEDIPINE 60 MG: 60 TABLET, FILM COATED, EXTENDED RELEASE ORAL at 20:42

## 2019-03-21 RX ADMIN — FAMOTIDINE 20 MG: 10 INJECTION INTRAVENOUS at 12:17

## 2019-03-21 RX ADMIN — PHENYLEPHRINE HYDROCHLORIDE 100 MCG: 10 INJECTION INTRAVENOUS at 13:14

## 2019-03-21 RX ADMIN — CEFAZOLIN SODIUM 2 G: 2 INJECTION, SOLUTION INTRAVENOUS at 12:21

## 2019-03-21 RX ADMIN — BUPIVACAINE HYDROCHLORIDE 1.6 ML: 7.5 INJECTION, SOLUTION EPIDURAL; RETROBULBAR at 12:48

## 2019-03-21 RX ADMIN — PHENYLEPHRINE HYDROCHLORIDE 100 MCG: 10 INJECTION INTRAVENOUS at 12:53

## 2019-03-21 RX ADMIN — PHENYLEPHRINE HYDROCHLORIDE 100 MCG: 10 INJECTION INTRAVENOUS at 13:18

## 2019-03-21 RX ADMIN — ACYCLOVIR 400 MG: 400 TABLET ORAL at 20:43

## 2019-03-21 RX ADMIN — PHENYLEPHRINE HYDROCHLORIDE 100 MCG: 10 INJECTION INTRAVENOUS at 13:00

## 2019-03-21 RX ADMIN — MORPHINE SULFATE 200 MCG: 1 INJECTION EPIDURAL; INTRATHECAL; INTRAVENOUS at 12:48

## 2019-03-21 RX ADMIN — OXYTOCIN 125 ML/HR: 10 INJECTION INTRAVENOUS at 14:50

## 2019-03-21 RX ADMIN — DIPHENHYDRAMINE HYDROCHLORIDE 25 MG: 50 INJECTION INTRAMUSCULAR; INTRAVENOUS at 13:44

## 2019-03-21 RX ADMIN — SODIUM CHLORIDE, POTASSIUM CHLORIDE, SODIUM LACTATE AND CALCIUM CHLORIDE: 600; 310; 30; 20 INJECTION, SOLUTION INTRAVENOUS at 13:21

## 2019-03-21 RX ADMIN — METOCLOPRAMIDE 10 MG: 5 INJECTION, SOLUTION INTRAMUSCULAR; INTRAVENOUS at 12:17

## 2019-03-21 RX ADMIN — SODIUM CHLORIDE, POTASSIUM CHLORIDE, SODIUM LACTATE AND CALCIUM CHLORIDE 1000 ML: 600; 310; 30; 20 INJECTION, SOLUTION INTRAVENOUS at 10:35

## 2019-03-21 RX ADMIN — IBUPROFEN 800 MG: 800 TABLET, FILM COATED ORAL at 17:10

## 2019-03-21 RX ADMIN — PHENYLEPHRINE HYDROCHLORIDE 100 MCG: 10 INJECTION INTRAVENOUS at 13:21

## 2019-03-21 RX ADMIN — FENTANYL CITRATE 15 MCG: 50 INJECTION INTRAMUSCULAR; INTRAVENOUS at 12:48

## 2019-03-21 RX ADMIN — PHENYLEPHRINE HYDROCHLORIDE 100 MCG: 10 INJECTION INTRAVENOUS at 13:27

## 2019-03-21 RX ADMIN — OXYCODONE HYDROCHLORIDE AND ACETAMINOPHEN 2 TABLET: 5; 325 TABLET ORAL at 23:48

## 2019-03-21 RX ADMIN — PHENYLEPHRINE HYDROCHLORIDE 100 MCG: 10 INJECTION INTRAVENOUS at 13:48

## 2019-03-21 RX ADMIN — ONDANSETRON HYDROCHLORIDE 4 MG: 2 SOLUTION INTRAMUSCULAR; INTRAVENOUS at 12:17

## 2019-03-21 NOTE — ANESTHESIA PREPROCEDURE EVALUATION
Anesthesia Evaluation     Patient summary reviewed and Nursing notes reviewed   NPO Solid Status: > 8 hours  NPO Liquid Status: > 4 hours           Airway   Dental      Pulmonary - negative pulmonary ROS   Cardiovascular     (+) hypertension poorly controlled,       Neuro/Psych- negative ROS  GI/Hepatic/Renal/Endo    (+) morbid obesity,  diabetes mellitus using insulin,     Musculoskeletal (-) negative ROS    Abdominal    Substance History - negative use     OB/GYN    (+) Pregnant,         Other - negative ROS                       Anesthesia Plan    ASA 3     spinal     Anesthetic plan, all risks, benefits, and alternatives have been provided, discussed and informed consent has been obtained with: patient.

## 2019-03-21 NOTE — ANESTHESIA POSTPROCEDURE EVALUATION
"Patient: Fany Brunson    Procedure Summary     Date:  19 Room / Location:   [facility] /  [facility]    Anesthesia Start:  1238 Anesthesia Stop:  1403    Procedure:   SECTION REPEAT (Bilateral Abdomen) Diagnosis:       High-risk pregnancy in second trimester      Insulin controlled gestational diabetes mellitus (GDM) in second trimester      Chronic hypertension in pregnancy      Previous  delivery, antepartum      (High-risk pregnancy in second trimester [O09.92])      (Insulin controlled gestational diabetes mellitus (GDM) in second trimester [O24.414])      (Chronic hypertension in pregnancy [O10.919])      (Previous  delivery, antepartum [O34.219])    Surgeon:  Salvador Carson MD Provider:  Bishop Keane MD    Anesthesia Type:  spinal ASA Status:  3          Anesthesia Type: spinal  Last vitals  BP   159/92 (19 1502)   Temp   36.6 °C (97.8 °F) (19 1447)   Pulse   85 (19 1502)   Resp   18 (19 1502)     SpO2   100 % (19 1502)     Post Anesthesia Care and Evaluation    Patient location during evaluation: bedside  Patient participation: complete - patient participated  Level of consciousness: awake and alert  Pain management: adequate  Airway patency: patent  Anesthetic complications: No anesthetic complications    Cardiovascular status: acceptable  Respiratory status: acceptable  Hydration status: acceptable    Comments: /92 (BP Location: Right arm, Patient Position: Sitting)   Pulse 85   Temp 36.6 °C (97.8 °F) (Oral)   Resp 18   Ht 162.6 cm (64\")   Wt 116 kg (256 lb 12.8 oz)   LMP 2018 (Exact Date)   SpO2 100%   BMI 44.08 kg/m²       "

## 2019-03-21 NOTE — ANESTHESIA PROCEDURE NOTES
Spinal Block      Patient reassessed immediately prior to procedure    Performed By  Anesthesiologist: Bishop Keane MD  Preanesthetic Checklist  Completed: patient identified, surgical consent, pre-op evaluation, timeout performed, risks and benefits discussed and monitors and equipment checked  Spinal Block Prep:  Patient Position:sitting  Sterile Tech:cap, gloves, mask and sterile barriers  Prep:Chloraprep  Patient Monitoring:blood pressure monitoring, continuous pulse oximetry and EKG  Spinal Block Procedure  Approach:midline  Guidance:landmark technique  Location:L3-L4  Needle Type:Sprotte  Needle Gauge:24  Paresthesia: no  Fluid Appearance:clear  Medications: fentaNYL citrate (PF) (SUBLIMAZE) injection, 15 mcg  Morphine PF injection, 200 mcg  bupivacaine PF (MARCAINE) 0.75 % injection, 1.6 mL  Med Administered at 3/21/2019 12:48 PM   Post Assessment  Patient Tolerance:patient tolerated the procedure well with no apparent complications  Complications no  Additional Notes  Negative for heme, negative aspiration, positive CSF

## 2019-03-22 LAB
BASOPHILS # BLD AUTO: 0.05 10*3/MM3 (ref 0–0.2)
BASOPHILS NFR BLD AUTO: 0.5 % (ref 0–1.5)
DEPRECATED RDW RBC AUTO: 44.2 FL (ref 37–54)
EOSINOPHIL # BLD AUTO: 0.08 10*3/MM3 (ref 0–0.4)
EOSINOPHIL NFR BLD AUTO: 0.8 % (ref 0.3–6.2)
ERYTHROCYTE [DISTWIDTH] IN BLOOD BY AUTOMATED COUNT: 13.9 % (ref 12.3–15.4)
GLUCOSE BLDC GLUCOMTR-MCNC: 114 MG/DL (ref 70–130)
GLUCOSE BLDC GLUCOMTR-MCNC: 117 MG/DL (ref 70–130)
GLUCOSE BLDC GLUCOMTR-MCNC: 120 MG/DL (ref 70–130)
GLUCOSE BLDC GLUCOMTR-MCNC: 92 MG/DL (ref 70–130)
HCT VFR BLD AUTO: 29.8 % (ref 34–46.6)
HGB BLD-MCNC: 9.7 G/DL (ref 12–15.9)
IMM GRANULOCYTES # BLD AUTO: 0.06 10*3/MM3 (ref 0–0.05)
IMM GRANULOCYTES NFR BLD AUTO: 0.6 % (ref 0–0.5)
LYMPHOCYTES # BLD AUTO: 1.95 10*3/MM3 (ref 0.7–3.1)
LYMPHOCYTES NFR BLD AUTO: 20.2 % (ref 19.6–45.3)
MCH RBC QN AUTO: 28.5 PG (ref 26.6–33)
MCHC RBC AUTO-ENTMCNC: 32.6 G/DL (ref 31.5–35.7)
MCV RBC AUTO: 87.6 FL (ref 79–97)
MONOCYTES # BLD AUTO: 0.46 10*3/MM3 (ref 0.1–0.9)
MONOCYTES NFR BLD AUTO: 4.8 % (ref 5–12)
NEUTROPHILS # BLD AUTO: 7.06 10*3/MM3 (ref 1.4–7)
NEUTROPHILS NFR BLD AUTO: 73.1 % (ref 42.7–76)
NRBC BLD AUTO-RTO: 0 /100 WBC (ref 0–0)
PLATELET # BLD AUTO: 221 10*3/MM3 (ref 140–450)
PMV BLD AUTO: 11.7 FL (ref 6–12)
RBC # BLD AUTO: 3.4 10*6/MM3 (ref 3.77–5.28)
WBC NRBC COR # BLD: 9.66 10*3/MM3 (ref 3.4–10.8)

## 2019-03-22 PROCEDURE — 25010000002 ENOXAPARIN PER 10 MG: Performed by: OBSTETRICS & GYNECOLOGY

## 2019-03-22 PROCEDURE — 99024 POSTOP FOLLOW-UP VISIT: CPT | Performed by: OBSTETRICS & GYNECOLOGY

## 2019-03-22 PROCEDURE — 85025 COMPLETE CBC W/AUTO DIFF WBC: CPT | Performed by: OBSTETRICS & GYNECOLOGY

## 2019-03-22 PROCEDURE — 82962 GLUCOSE BLOOD TEST: CPT

## 2019-03-22 RX ORDER — ECHINACEA PURPUREA EXTRACT 125 MG
2 TABLET ORAL AS NEEDED
Status: DISCONTINUED | OUTPATIENT
Start: 2019-03-22 | End: 2019-03-24 | Stop reason: HOSPADM

## 2019-03-22 RX ADMIN — IBUPROFEN 800 MG: 800 TABLET, FILM COATED ORAL at 08:36

## 2019-03-22 RX ADMIN — NIFEDIPINE 60 MG: 60 TABLET, FILM COATED, EXTENDED RELEASE ORAL at 08:35

## 2019-03-22 RX ADMIN — POLYETHYLENE GLYCOL 3350 17 G: 17 POWDER, FOR SOLUTION ORAL at 08:35

## 2019-03-22 RX ADMIN — ENOXAPARIN SODIUM 40 MG: 40 INJECTION SUBCUTANEOUS at 14:45

## 2019-03-22 RX ADMIN — ACYCLOVIR 400 MG: 400 TABLET ORAL at 21:03

## 2019-03-22 RX ADMIN — Medication 1 TABLET: at 08:35

## 2019-03-22 RX ADMIN — ACYCLOVIR 400 MG: 400 TABLET ORAL at 08:35

## 2019-03-22 RX ADMIN — NIFEDIPINE 60 MG: 60 TABLET, FILM COATED, EXTENDED RELEASE ORAL at 21:03

## 2019-03-22 RX ADMIN — IBUPROFEN 800 MG: 800 TABLET, FILM COATED ORAL at 17:33

## 2019-03-22 RX ADMIN — OXYCODONE HYDROCHLORIDE AND ACETAMINOPHEN 2 TABLET: 5; 325 TABLET ORAL at 08:35

## 2019-03-22 RX ADMIN — OXYCODONE HYDROCHLORIDE AND ACETAMINOPHEN 2 TABLET: 5; 325 TABLET ORAL at 17:33

## 2019-03-22 RX ADMIN — SALINE NASAL SPRAY 2 SPRAY: 1.5 SOLUTION NASAL at 22:21

## 2019-03-23 LAB
GLUCOSE BLDC GLUCOMTR-MCNC: 126 MG/DL (ref 70–130)
GLUCOSE BLDC GLUCOMTR-MCNC: 132 MG/DL (ref 70–130)
GLUCOSE BLDC GLUCOMTR-MCNC: 95 MG/DL (ref 70–130)

## 2019-03-23 PROCEDURE — 25010000002 ENOXAPARIN PER 10 MG: Performed by: OBSTETRICS & GYNECOLOGY

## 2019-03-23 PROCEDURE — 82962 GLUCOSE BLOOD TEST: CPT

## 2019-03-23 PROCEDURE — 99024 POSTOP FOLLOW-UP VISIT: CPT | Performed by: OBSTETRICS & GYNECOLOGY

## 2019-03-23 RX ORDER — LABETALOL 200 MG/1
200 TABLET, FILM COATED ORAL EVERY 12 HOURS SCHEDULED
Status: DISCONTINUED | OUTPATIENT
Start: 2019-03-23 | End: 2019-03-24 | Stop reason: HOSPADM

## 2019-03-23 RX ADMIN — POLYETHYLENE GLYCOL 3350 17 G: 17 POWDER, FOR SOLUTION ORAL at 08:29

## 2019-03-23 RX ADMIN — LABETALOL HCL 200 MG: 200 TABLET, FILM COATED ORAL at 12:38

## 2019-03-23 RX ADMIN — IBUPROFEN 800 MG: 800 TABLET, FILM COATED ORAL at 03:30

## 2019-03-23 RX ADMIN — ACYCLOVIR 400 MG: 400 TABLET ORAL at 21:22

## 2019-03-23 RX ADMIN — OXYCODONE HYDROCHLORIDE AND ACETAMINOPHEN 2 TABLET: 5; 325 TABLET ORAL at 08:29

## 2019-03-23 RX ADMIN — OXYCODONE HYDROCHLORIDE AND ACETAMINOPHEN 2 TABLET: 5; 325 TABLET ORAL at 00:19

## 2019-03-23 RX ADMIN — OXYCODONE HYDROCHLORIDE AND ACETAMINOPHEN 2 TABLET: 5; 325 TABLET ORAL at 14:09

## 2019-03-23 RX ADMIN — IBUPROFEN 800 MG: 800 TABLET, FILM COATED ORAL at 14:09

## 2019-03-23 RX ADMIN — IBUPROFEN 800 MG: 800 TABLET, FILM COATED ORAL at 23:02

## 2019-03-23 RX ADMIN — OXYCODONE HYDROCHLORIDE AND ACETAMINOPHEN 2 TABLET: 5; 325 TABLET ORAL at 21:35

## 2019-03-23 RX ADMIN — Medication 1 TABLET: at 08:29

## 2019-03-23 RX ADMIN — LABETALOL HCL 200 MG: 200 TABLET, FILM COATED ORAL at 21:22

## 2019-03-23 RX ADMIN — NIFEDIPINE 60 MG: 60 TABLET, FILM COATED, EXTENDED RELEASE ORAL at 21:22

## 2019-03-23 RX ADMIN — NIFEDIPINE 60 MG: 60 TABLET, FILM COATED, EXTENDED RELEASE ORAL at 08:29

## 2019-03-23 RX ADMIN — ENOXAPARIN SODIUM 40 MG: 40 INJECTION SUBCUTANEOUS at 14:04

## 2019-03-23 RX ADMIN — ACYCLOVIR 400 MG: 400 TABLET ORAL at 08:29

## 2019-03-24 VITALS
TEMPERATURE: 98 F | OXYGEN SATURATION: 97 % | HEIGHT: 64 IN | RESPIRATION RATE: 18 BRPM | BODY MASS INDEX: 43.84 KG/M2 | DIASTOLIC BLOOD PRESSURE: 83 MMHG | WEIGHT: 256.8 LBS | SYSTOLIC BLOOD PRESSURE: 143 MMHG | HEART RATE: 103 BPM

## 2019-03-24 LAB — GLUCOSE BLDC GLUCOMTR-MCNC: 111 MG/DL (ref 70–130)

## 2019-03-24 PROCEDURE — 82962 GLUCOSE BLOOD TEST: CPT

## 2019-03-24 PROCEDURE — 99024 POSTOP FOLLOW-UP VISIT: CPT | Performed by: OBSTETRICS & GYNECOLOGY

## 2019-03-24 RX ORDER — OXYCODONE HYDROCHLORIDE AND ACETAMINOPHEN 5; 325 MG/1; MG/1
2 TABLET ORAL EVERY 6 HOURS PRN
Qty: 24 TABLET | Refills: 0 | Status: SHIPPED | OUTPATIENT
Start: 2019-03-24 | End: 2019-03-31

## 2019-03-24 RX ORDER — IBUPROFEN 800 MG/1
800 TABLET ORAL EVERY 8 HOURS PRN
Qty: 30 TABLET | Refills: 0 | Status: SHIPPED | OUTPATIENT
Start: 2019-03-24 | End: 2019-03-28

## 2019-03-24 RX ORDER — NIFEDIPINE 60 MG/1
60 TABLET, FILM COATED, EXTENDED RELEASE ORAL 2 TIMES DAILY
Qty: 60 TABLET | Refills: 0 | Status: SHIPPED | OUTPATIENT
Start: 2019-03-24

## 2019-03-24 RX ORDER — LABETALOL 200 MG/1
200 TABLET, FILM COATED ORAL EVERY 12 HOURS SCHEDULED
Qty: 60 TABLET | Refills: 0 | Status: SHIPPED | OUTPATIENT
Start: 2019-03-24 | End: 2019-04-25 | Stop reason: ALTCHOICE

## 2019-03-24 RX ADMIN — NIFEDIPINE 60 MG: 60 TABLET, FILM COATED, EXTENDED RELEASE ORAL at 09:14

## 2019-03-24 RX ADMIN — Medication 1 TABLET: at 09:11

## 2019-03-24 RX ADMIN — IBUPROFEN 800 MG: 800 TABLET, FILM COATED ORAL at 09:11

## 2019-03-24 RX ADMIN — DOCUSATE SODIUM 100 MG: 100 CAPSULE, LIQUID FILLED ORAL at 09:11

## 2019-03-24 RX ADMIN — POLYETHYLENE GLYCOL 3350 17 G: 17 POWDER, FOR SOLUTION ORAL at 09:11

## 2019-03-24 RX ADMIN — OXYCODONE HYDROCHLORIDE AND ACETAMINOPHEN 2 TABLET: 5; 325 TABLET ORAL at 05:22

## 2019-03-24 RX ADMIN — LABETALOL HCL 200 MG: 200 TABLET, FILM COATED ORAL at 09:14

## 2019-03-24 RX ADMIN — ACYCLOVIR 400 MG: 400 TABLET ORAL at 09:14

## 2019-03-25 ENCOUNTER — TELEPHONE (OUTPATIENT)
Dept: OBSTETRICS AND GYNECOLOGY | Facility: CLINIC | Age: 34
End: 2019-03-25

## 2019-03-28 ENCOUNTER — OFFICE VISIT (OUTPATIENT)
Dept: OBSTETRICS AND GYNECOLOGY | Facility: CLINIC | Age: 34
End: 2019-03-28

## 2019-03-28 VITALS
BODY MASS INDEX: 40.97 KG/M2 | WEIGHT: 240 LBS | HEIGHT: 64 IN | SYSTOLIC BLOOD PRESSURE: 156 MMHG | HEART RATE: 87 BPM | DIASTOLIC BLOOD PRESSURE: 99 MMHG

## 2019-03-28 DIAGNOSIS — Z98.890 POST-OPERATIVE STATE: Primary | ICD-10-CM

## 2019-03-28 PROCEDURE — 0503F POSTPARTUM CARE VISIT: CPT | Performed by: OBSTETRICS & GYNECOLOGY

## 2019-03-28 NOTE — PROGRESS NOTES
"Subjective   Fany Brunson is a 34 y.o. female here for Post op exam. Pt is s/p  on 2019. Pt is doing well.     History of Present Illness     34 y.o.    S/P LTCS last week   Doing well. Good BP   Good bowel and bladder function     Review of Systems   Constitutional: Negative for chills and fever.   Respiratory: Negative for shortness of breath.    Cardiovascular: Negative for chest pain.   Gastrointestinal: Negative for abdominal pain, nausea and vomiting.   Genitourinary: Negative for pelvic pain and vaginal bleeding.       Objective    /99   Pulse 87   Ht 162.6 cm (64\")   Wt 109 kg (240 lb)   Breastfeeding? No   BMI 41.20 kg/m²   Physical Exam   Constitutional: She appears well-developed and well-nourished. No distress.   Abdominal: Soft. Bowel sounds are normal. She exhibits no distension (incision - clean, dry and intact ). There is no tenderness.   Musculoskeletal: Normal range of motion. She exhibits no edema.   Skin: Skin is warm and dry. No rash noted. No erythema.   Psychiatric: She has a normal mood and affect. Her behavior is normal.         Assessment/Plan   Problems Addressed this Visit     None      Visit Diagnoses     Post-operative state    -  Primary    Hypertension, postpartum condition or complication        Diabetes mellitus, postpartum              1) Post op   Continue pelvic rest and no heavy lifting   Follow up 4-5 weeks     2) HTN   On procardia and labetalol   Elevated today, but normal at home.   To continue for now - see if able to wean off one medication     3) DM   Check BS Fasting and 2 hours post meals x one week   Send to Cindy Christine to see if need to return to treatment for active DM   If not able - repat 2 hour at 6-8 weeks     Salvador Carson MD  3/28/2019  1:16 PM           "

## 2019-04-01 ENCOUNTER — TELEPHONE (OUTPATIENT)
Dept: DIABETES SERVICES | Facility: HOSPITAL | Age: 34
End: 2019-04-01

## 2019-04-10 ENCOUNTER — TELEPHONE (OUTPATIENT)
Dept: OBSTETRICS AND GYNECOLOGY | Facility: CLINIC | Age: 34
End: 2019-04-10

## 2019-04-10 NOTE — TELEPHONE ENCOUNTER
Hilary,     She should be coming up soon for post op right?    Thanks   Dr. Carson    ----- Message from Cindy Cornell, RN sent at 4/8/2019 12:24 PM EDT -----  Hi Dr. Carson, I have reached out to Fany both on the phone and thru MyChart. She has not sent me BGs to look at yet. On the phone, I did urge her of the importance to see her PCP. She does not have a PCP.   Thank you, Cindy

## 2019-04-25 ENCOUNTER — OFFICE VISIT (OUTPATIENT)
Dept: OBSTETRICS AND GYNECOLOGY | Facility: CLINIC | Age: 34
End: 2019-04-25

## 2019-04-25 VITALS
WEIGHT: 237 LBS | SYSTOLIC BLOOD PRESSURE: 129 MMHG | DIASTOLIC BLOOD PRESSURE: 82 MMHG | BODY MASS INDEX: 40.46 KG/M2 | HEIGHT: 64 IN

## 2019-04-25 DIAGNOSIS — Z30.011 OCP (ORAL CONTRACEPTIVE PILLS) INITIATION: ICD-10-CM

## 2019-04-25 DIAGNOSIS — Z13.1 ENCOUNTER FOR SCREENING FOR DIABETES MELLITUS: ICD-10-CM

## 2019-04-25 PROCEDURE — 0503F POSTPARTUM CARE VISIT: CPT | Performed by: OBSTETRICS & GYNECOLOGY

## 2019-04-25 NOTE — PROGRESS NOTES
"Here for Postpartum Check     HPI    34 y.o.  - Delivered 2019, now at 6 weeks postpartum for follow up.   Complications of pregnancy/delivery/postpartum : , HTN, GDM  Breastfeeding/Bottlefeeding : Bottle feeding  Baby Blues: denies  Contraception : interested  Last pap : 2018 NL HPV neg  Complaints: none- is only taking blood pressure medications once a day. Readings are very low if she takes medication twice a day.    Review of Systems   Constitutional: Negative for chills and fever.   Gastrointestinal: Positive for abdominal pain.   Genitourinary: Positive for pelvic pain. Negative for vaginal bleeding and vaginal discharge.   All other systems reviewed and are negative.      /82 (BP Location: Right leg, Patient Position: Sitting)   Ht 162.6 cm (64\")   Wt 108 kg (237 lb)   Breastfeeding? No   BMI 40.68 kg/m²     Physical Exam   Constitutional: She is oriented to person, place, and time. She appears well-developed and well-nourished. No distress. She is obese.  HENT:   Head: Normocephalic and atraumatic.   Eyes: Conjunctivae are normal. Right eye exhibits no discharge. Left eye exhibits no discharge.   Neck: Normal range of motion. Neck supple. No thyromegaly present.   Cardiovascular: Normal rate, regular rhythm and normal heart sounds.   No murmur heard.  Pulmonary/Chest: Effort normal and breath sounds normal. No respiratory distress. Right breast exhibits no inverted nipple, no mass and no nipple discharge. Left breast exhibits no inverted nipple, no mass and no nipple discharge.   Abdominal: Soft. Bowel sounds are normal. She exhibits no distension (incision clean, dry and intact ). There is no tenderness.   Genitourinary: Vagina normal and cervix normal. Pelvic exam was performed with patient supine. There is no lesion or Bartholin's cyst on the right labia. There is no lesion or Bartholin's cyst on the left labia. Uterus is anteverted. Uterus is not deviated, enlarged " (difficult exam with habitus and section scar), fixed or exhibiting a mass.   Cervix is not parous. Cervix does not exhibit motion tenderness. Right adnexum displays no mass, no tenderness and no fullness. Left adnexum displays no mass, no tenderness and no fullness. No bleeding in the vagina. No vaginal discharge found.   Musculoskeletal: Normal range of motion. She exhibits no edema.   Lymphadenopathy:     She has no cervical adenopathy.        Right: No inguinal adenopathy present.        Left: No inguinal adenopathy present.   Neurological: She is alert and oriented to person, place, and time.   Skin: Skin is warm and dry. No rash noted.   Psychiatric: She has a normal mood and affect. Her behavior is normal. Judgment and thought content normal.         Assessment/plan   Problems Addressed this Visit     None      Visit Diagnoses     Postpartum state    -  Primary    Hypertension, postpartum condition or complication        Diabetes mellitus, postpartum        Relevant Orders    Glucose Tolerance, 2 Hours    OCP (oral contraceptive pills) initiation        Encounter for screening for diabetes mellitus         Relevant Orders    Glucose Tolerance, 2 Hours        1) PP visit  Released from restrictions - Doing well.   Recommendation : 150 min/week of moderate intensity aerobic activity     2) HTN - chronic issue  Use just the nifedipine 60 mg XL daily since will be best to control symptoms  Discontinue labetalol  Will need follow up with PCP     3) DM, found in pregnancy   Suspect pre-gestational  Reports ok BS control on diet (but did not bring log)   2 hour OGTT ordered  Follow up per results, suspect may need PCP to control, but could be candidate for oral medication  Put in PCP referral (not sure she has consistent care outside of pregnancy)    4) OCP start  How to start  Common side effects  Life threatening complications   Efficiency reviewed.   Non - smoker     Salvador Carson MD  4/25/2019  2:00  PM

## 2019-08-25 NOTE — PROGRESS NOTES
OB follow up     Fany Brunson is a 33 y.o.  11w2d being seen today for her obstetrical visit.  Patient reports no complaints. Fetal movement: absent.    Review of Systems  No bleeding, No cramping/contractions     /90   Wt 108 kg (239 lb)   LMP 2018 (Exact Date)   BMI 41.02 kg/m²     FHT: 161 BPM   Uterine Size: 12 cm       Assessment    1) pregnancy at 11w2d   Discussed down syndrome screening, declined.   2) +1 Leuks  3) DM - On insulin   Fasting    2 hour post paradial - b'fast - 104-152  2 hour lunch   2 hour dinner 112-156  Start by increasing Nighttime NPH to 12 units   Follow up with [facility] diabetes program   4) HTN   On procardia XL 90 mg   Good control   Start ASA 81 mg daily   5) Obesity - weight gain excellent  6) Previous  stable.       Plan    Reviewed this stage of pregnancy  Problem list updated   Follow up in 4 weeks    Salvador Carson MD   2018  11:20 AM     never used

## 2019-11-03 ENCOUNTER — TELEPHONE (OUTPATIENT)
Dept: OBSTETRICS AND GYNECOLOGY | Facility: CLINIC | Age: 34
End: 2019-11-03

## 2019-11-03 NOTE — TELEPHONE ENCOUNTER
Lisandra,     Needs to do 2 hour glucose test for active diabetes.   Please encourage to follow up.     Thanks   Dr. Carson

## 2021-04-16 ENCOUNTER — BULK ORDERING (OUTPATIENT)
Dept: CASE MANAGEMENT | Facility: OTHER | Age: 36
End: 2021-04-16

## 2021-04-16 DIAGNOSIS — Z23 IMMUNIZATION DUE: ICD-10-CM

## 2023-11-07 ENCOUNTER — INITIAL PRENATAL (OUTPATIENT)
Dept: OBSTETRICS AND GYNECOLOGY | Facility: CLINIC | Age: 38
End: 2023-11-07
Payer: MEDICARE

## 2023-11-07 VITALS — DIASTOLIC BLOOD PRESSURE: 100 MMHG | SYSTOLIC BLOOD PRESSURE: 191 MMHG | BODY MASS INDEX: 42.57 KG/M2 | WEIGHT: 248 LBS

## 2023-11-07 DIAGNOSIS — Z12.4 SCREENING FOR CERVICAL CANCER: ICD-10-CM

## 2023-11-07 DIAGNOSIS — E66.01 MATERNAL MORBID OBESITY IN FIRST TRIMESTER, ANTEPARTUM: ICD-10-CM

## 2023-11-07 DIAGNOSIS — O10.011 PRE-EXISTING ESSENTIAL HYPERTENSION DURING PREGNANCY IN FIRST TRIMESTER: ICD-10-CM

## 2023-11-07 DIAGNOSIS — O24.311 PRE-EXISTING DIABETES MELLITUS DURING PREGNANCY IN FIRST TRIMESTER: ICD-10-CM

## 2023-11-07 DIAGNOSIS — F41.9 ANXIETY IN PREGNANCY IN FIRST TRIMESTER, ANTEPARTUM: ICD-10-CM

## 2023-11-07 DIAGNOSIS — Z11.3 SCREEN FOR STD (SEXUALLY TRANSMITTED DISEASE): ICD-10-CM

## 2023-11-07 DIAGNOSIS — Z11.59 ENCOUNTER FOR SCREENING FOR OTHER VIRAL DISEASES: ICD-10-CM

## 2023-11-07 DIAGNOSIS — O99.211 MATERNAL MORBID OBESITY IN FIRST TRIMESTER, ANTEPARTUM: ICD-10-CM

## 2023-11-07 DIAGNOSIS — Z86.32 HISTORY OF GESTATIONAL DIABETES IN PRIOR PREGNANCY, CURRENTLY PREGNANT IN FIRST TRIMESTER: ICD-10-CM

## 2023-11-07 DIAGNOSIS — O99.341 ANXIETY IN PREGNANCY IN FIRST TRIMESTER, ANTEPARTUM: ICD-10-CM

## 2023-11-07 DIAGNOSIS — O09.521 MULTIGRAVIDA OF ADVANCED MATERNAL AGE IN FIRST TRIMESTER: Primary | ICD-10-CM

## 2023-11-07 DIAGNOSIS — O34.219 PREVIOUS CESAREAN DELIVERY, ANTEPARTUM: ICD-10-CM

## 2023-11-07 DIAGNOSIS — O09.291 HISTORY OF GESTATIONAL DIABETES IN PRIOR PREGNANCY, CURRENTLY PREGNANT IN FIRST TRIMESTER: ICD-10-CM

## 2023-11-07 DIAGNOSIS — R39.15 URGENCY OF URINATION: ICD-10-CM

## 2023-11-07 DIAGNOSIS — O09.92 SUPERVISION OF HIGH RISK PREGNANCY, UNSPECIFIED, SECOND TRIMESTER: ICD-10-CM

## 2023-11-07 DIAGNOSIS — O09.41 SUPERVISION OF HIGH-RISK PREGNANCY WITH GRAND MULTIPARITY IN FIRST TRIMESTER: ICD-10-CM

## 2023-11-07 LAB
GLUCOSE UR STRIP-MCNC: ABNORMAL MG/DL
PROT UR STRIP-MCNC: ABNORMAL MG/DL

## 2023-11-07 RX ORDER — NIFEDIPINE 30 MG/1
60 TABLET, EXTENDED RELEASE ORAL DAILY
Qty: 30 TABLET | Refills: 2 | Status: SHIPPED | OUTPATIENT
Start: 2023-11-07

## 2023-11-07 RX ORDER — SWAB
1 SWAB, NON-MEDICATED MISCELLANEOUS DAILY
Qty: 90 EACH | Refills: 3 | Status: SHIPPED | OUTPATIENT
Start: 2023-11-07

## 2023-11-07 RX ORDER — HYDROXYZINE PAMOATE 25 MG/1
25 CAPSULE ORAL 4 TIMES DAILY PRN
Qty: 120 CAPSULE | Refills: 4 | Status: SHIPPED | OUTPATIENT
Start: 2023-11-07

## 2023-11-07 RX ORDER — ASPIRIN 81 MG/1
81 TABLET, CHEWABLE ORAL DAILY
Qty: 30 TABLET | Refills: 7 | Status: SHIPPED | OUTPATIENT
Start: 2023-11-07

## 2023-11-07 RX ORDER — FLUOXETINE HYDROCHLORIDE 20 MG/1
20 CAPSULE ORAL DAILY
COMMUNITY
End: 2023-11-07

## 2023-11-07 NOTE — PROGRESS NOTES
Initial ob visit     CC- Here for care of pregnancy     Fany Brunson is being seen today for her first obstetrical visit.  She is a 38 y.o.    10w0d gestation.     # 1 - Date: 06, Sex: Male, Weight: 4252 g (9 lb 6 oz), GA: 37w0d, Delivery: , Low Transverse, Apgar1: None, Apgar5: None, Living: Living, Birth Comments: None    # 2 - Date: 08, Sex: Male, Weight: 4111 g (9 lb 1 oz), GA: 39w0d, Delivery: , Low Transverse, Apgar1: None, Apgar5: None, Living: Living, Birth Comments: None    # 3 - Date: 09, Sex: Male, Weight: 3799 g (8 lb 6 oz), GA: 39w0d, Delivery: , Low Transverse, Apgar1: None, Apgar5: None, Living: Living, Birth Comments: None    # 4 - Date: 11, Sex: Male, Weight: 3657 g (8 lb 1 oz), GA: 39w0d, Delivery: , Low Transverse, Apgar1: None, Apgar5: None, Living: Living, Birth Comments: None    # 5 - Date: 10/15/15, Sex: Male, Weight: 3402 g (7 lb 8 oz), GA: 39w0d, Delivery: , Low Transverse, Apgar1: None, Apgar5: None, Living: Living, Birth Comments: None    # 6 - Date: 19, Sex: Male, Weight: 3775 g (8 lb 5.2 oz), GA: 37w4d, Delivery: , Low Transverse, Apgar1: 9, Apgar5: 9, Living: Living, Birth Comments: panda 2    # 7 - Date: None, Sex: None, Weight: None, GA: None, Delivery: None, Apgar1: None, Apgar5: None, Living: None, Birth Comments: None      Current obstetric complaints : spotting when wiping  Duration/severity of complaints: last night  Initial positive test date : 4-5 weeks ago     Location : @ home    Prior obstetric issues, potential pregnancy concerns:  x 6, HTN, h/o GDM, HSV, AMA  Family history of genetic issues (includes FOB): none  Prior infections concerning in pregnancy (Rash, fever in last 2 weeks): none  Varicella Hx -immunity  Prior testing for Cystic Fibrosis Carrier or Sickle Cell Trait- negative CF  Prepregnancy BMI - Body mass index is 42.57 kg/m².  Hx of HSV for patient or partner  : Yes     Past Medical History:   Diagnosis Date    Anxiety     Gestational diabetes     Herpes     HSV #1 - cold sores    Hypertension     chronic    Varicella        Past Surgical History:   Procedure Laterality Date    APPENDECTOMY      BREAST BIOPSY  10/2022     SECTION       SECTION Bilateral 2019    Procedure:  SECTION REPEAT;  Surgeon: Salvador Carson MD;  [facility];  Service: Obstetrics/Gynecology    CHOLECYSTECTOMY      CHOLECYSTECTOMY      LAPAROSCOPIC CHOLECYSTECTOMY      WRIST SURGERY           Current Outpatient Medications:     aspirin 81 MG chewable tablet, Chew 1 tablet Daily., Disp: 30 tablet, Rfl: 7    hydrOXYzine pamoate (VISTARIL) 25 MG capsule, Take 1 capsule by mouth 4 (Four) Times a Day As Needed for Anxiety., Disp: 120 capsule, Rfl: 4    NIFEdipine XL (PROCARDIA XL) 30 MG 24 hr tablet, Take 2 tablets by mouth Daily., Disp: 30 tablet, Rfl: 2    Prenatal 28-0.8 MG tablet, Take 1 tablet by mouth Daily. Please use formulary or generic, with DHA ideal, Disp: 90 each, Rfl: 3    Allergies   Allergen Reactions    Latex Rash       Social History     Socioeconomic History    Marital status:    Tobacco Use    Smoking status: Former     Types: Cigarettes    Smokeless tobacco: Never   Vaping Use    Vaping Use: Never used   Substance and Sexual Activity    Alcohol use: No    Drug use: No    Sexual activity: Yes     Partners: Male       Family History   Problem Relation Age of Onset    Stroke Father     Breast cancer Paternal Grandmother         Passed at age 39 from breast cancer    Ovarian cancer Neg Hx     Uterine cancer Neg Hx     Colon cancer Neg Hx     Deep vein thrombosis Neg Hx     Pulmonary embolism Neg Hx        Review of systems     Review of Systems   Constitutional:  Negative for fever.   Eyes:  Negative for visual disturbance.   Gastrointestinal:  Positive for nausea. Negative for abdominal pain and vomiting.    Genitourinary:  Negative for breast pain, pelvic pain, vaginal bleeding and vaginal discharge.   Neurological:  Negative for headache.   All other systems reviewed and are negative.          Objective    BP (!) 191/100   Wt 112 kg (248 lb)   LMP 2023   BMI 42.57 kg/m²     Prenatal Assessment  Fetal Heart Rate: +  Fundal Height (cm): 10 cm  Movement: Absent  Dilation/Effacement/Station  Dilation: Closed  Effacement (%): 0  Station: -2  Edema  LLE Edema: None  RLE Edema: None  Facial Edema: None    OBGyn Exam     Brief Urine Lab Results  (Last result in the past 365 days)        Color   Clarity   Blood   Leuk Est   Nitrite   Protein   CREAT   Urine HCG        23 1049           Trace                    Assessment & Plan     Diagnoses and all orders for this visit:    1. Multigravida of advanced maternal age in first trimester (Primary)  -     POC Urinalysis Dipstick  -     OB Panel With HIV  -     Urine Culture - Urine, Urine, Clean Catch    2. Supervision of high-risk pregnancy with grand multiparity in first trimester    3. Previous  delivery, antepartum    4. Pre-existing essential hypertension during pregnancy in first trimester  -     Protein / Creatinine Ratio, Urine - Urine, Clean Catch  -     Comprehensive Metabolic Panel    5. History of gestational diabetes in prior pregnancy, currently pregnant in first trimester  -     TSH  -     T4, Free  -     Hemoglobin A1c    6. Maternal morbid obesity in first trimester, antepartum  -     TSH  -     T4, Free  -     Hemoglobin A1c    7. Anxiety in pregnancy in first trimester, antepartum    8. Screen for STD (sexually transmitted disease)  -     Chlamydia trachomatis, Neisseria gonorrhoeae, Trichomonas vaginalis, PCR - Swab, Vagina    9. Screening for cervical cancer  -     IGP, Apt HPV,rfx 16 / 18,45    10. Encounter for screening for other viral diseases  -     OB Panel With HIV    11. Supervision of high risk pregnancy, unspecified, second  trimester  -     OB Panel With HIV    12. Urgency of urination  -     Urine Culture - Urine, Urine, Clean Catch    Other orders  -     NIFEdipine XL (PROCARDIA XL) 30 MG 24 hr tablet; Take 2 tablets by mouth Daily.  Dispense: 30 tablet; Refill: 2  -     Prenatal 28-0.8 MG tablet; Take 1 tablet by mouth Daily. Please use formulary or generic, with DHA ideal  Dispense: 90 each; Refill: 3  -     aspirin 81 MG chewable tablet; Chew 1 tablet Daily.  Dispense: 30 tablet; Refill: 7  -     hydrOXYzine pamoate (VISTARIL) 25 MG capsule; Take 1 capsule by mouth 4 (Four) Times a Day As Needed for Anxiety.  Dispense: 120 capsule; Refill: 4        1) Pregnancy at 10w0d  2) AMA - Discussed and declined NIPT for now.   Starting ASA for age as well as HTN  3) Grand multiparity   6 prior C-Sections   See other discussion points   No intervention possible.   4) Prior    Will need repeat at term   Last note with uterine window/thin lower segment.   Will likely need MFM for HTN, etc. But may need consult about timing of delivery   5) Chronic HTN   BP severely elevated  Check baseline labs  Staring ASA  Starting treatment with procardia XL 60 mg   6) hx of gest DM  Reports good hgA1c by PCP   Checking labs for HgA1c and TSH/T4 today   7) Obesity   Pre pregnancy BMI > 35   Goal of overall/interval gain discussed, but already over gained.   8) Anxiety in pregnancy   Stopped her prozac,   Discussed options but wants to not be on SSRI for now   Rx for vistaril sent to do PRN          Activity recommendation : 150 minutes/week of moderate intensity aerobic activity unless we limit for bleeding, hypertension or other pregnancy complication   Patient is on Prenatal vitamins  Problem list reviewed and updated.  Reviewed routine prenatal care with the office to include but not limited to   General pregnancy recommendations reviewed including but not limited to not changing cat litter, food restrictions, avoidance of alcohol, tobacco and  drugs and saunas/hot tubs.   All questions answered.     Salvador Carson MD   11/7/2023  11:37 EST

## 2023-11-07 NOTE — Clinical Note
Patricia, confirming nothing new.  Appears to be medicare pregnancy which I think we treat global. Thanks, Dr. Carson

## 2023-11-08 ENCOUNTER — TELEPHONE (OUTPATIENT)
Dept: OBSTETRICS AND GYNECOLOGY | Facility: CLINIC | Age: 38
End: 2023-11-08
Payer: MEDICARE

## 2023-11-08 LAB
ABO GROUP BLD: ABNORMAL
ALBUMIN SERPL-MCNC: 3.8 G/DL (ref 3.9–4.9)
ALBUMIN/GLOB SERPL: 1.4 {RATIO} (ref 1.2–2.2)
ALP SERPL-CCNC: 101 IU/L (ref 44–121)
ALT SERPL-CCNC: 12 IU/L (ref 0–32)
AST SERPL-CCNC: 12 IU/L (ref 0–40)
BASOPHILS # BLD AUTO: 0.1 X10E3/UL (ref 0–0.2)
BASOPHILS NFR BLD AUTO: 1 %
BILIRUB SERPL-MCNC: <0.2 MG/DL (ref 0–1.2)
BLD GP AB SCN SERPL QL: NEGATIVE
BUN SERPL-MCNC: 10 MG/DL (ref 6–20)
BUN/CREAT SERPL: 17 (ref 9–23)
CALCIUM SERPL-MCNC: 9.6 MG/DL (ref 8.7–10.2)
CHLORIDE SERPL-SCNC: 102 MMOL/L (ref 96–106)
CO2 SERPL-SCNC: 22 MMOL/L (ref 20–29)
CREAT SERPL-MCNC: 0.59 MG/DL (ref 0.57–1)
CREAT UR-MCNC: 31.4 MG/DL
EGFRCR SERPLBLD CKD-EPI 2021: 118 ML/MIN/1.73
EOSINOPHIL # BLD AUTO: 0.3 X10E3/UL (ref 0–0.4)
EOSINOPHIL NFR BLD AUTO: 3 %
ERYTHROCYTE [DISTWIDTH] IN BLOOD BY AUTOMATED COUNT: 14.6 % (ref 11.7–15.4)
GLOBULIN SER CALC-MCNC: 2.7 G/DL (ref 1.5–4.5)
GLUCOSE SERPL-MCNC: 172 MG/DL (ref 70–99)
HBA1C MFR BLD: 6.5 % (ref 4.8–5.6)
HBV SURFACE AG SERPL QL IA: NEGATIVE
HCT VFR BLD AUTO: 40.3 % (ref 34–46.6)
HCV IGG SERPL QL IA: NON REACTIVE
HGB BLD-MCNC: 13 G/DL (ref 11.1–15.9)
HIV 1+2 AB+HIV1 P24 AG SERPL QL IA: NON REACTIVE
IMM GRANULOCYTES # BLD AUTO: 0 X10E3/UL (ref 0–0.1)
IMM GRANULOCYTES NFR BLD AUTO: 0 %
LYMPHOCYTES # BLD AUTO: 2.2 X10E3/UL (ref 0.7–3.1)
LYMPHOCYTES NFR BLD AUTO: 23 %
MCH RBC QN AUTO: 26 PG (ref 26.6–33)
MCHC RBC AUTO-ENTMCNC: 32.3 G/DL (ref 31.5–35.7)
MCV RBC AUTO: 81 FL (ref 79–97)
MONOCYTES # BLD AUTO: 0.5 X10E3/UL (ref 0.1–0.9)
MONOCYTES NFR BLD AUTO: 5 %
NEUTROPHILS # BLD AUTO: 6.6 X10E3/UL (ref 1.4–7)
NEUTROPHILS NFR BLD AUTO: 68 %
PLATELET # BLD AUTO: 307 X10E3/UL (ref 150–450)
POTASSIUM SERPL-SCNC: 3.7 MMOL/L (ref 3.5–5.2)
PROT SERPL-MCNC: 6.5 G/DL (ref 6–8.5)
PROT UR-MCNC: 31.9 MG/DL
PROT/CREAT UR: 1016 MG/G CREAT (ref 0–200)
RBC # BLD AUTO: 5 X10E6/UL (ref 3.77–5.28)
RH BLD: POSITIVE
RPR SER QL: NON REACTIVE
RUBV IGG SERPL IA-ACNC: 4.54 INDEX
SODIUM SERPL-SCNC: 138 MMOL/L (ref 134–144)
T4 FREE SERPL-MCNC: 1.22 NG/DL (ref 0.82–1.77)
TSH SERPL DL<=0.005 MIU/L-ACNC: 2.65 UIU/ML (ref 0.45–4.5)
WBC # BLD AUTO: 9.6 X10E3/UL (ref 3.4–10.8)

## 2023-11-08 RX ORDER — GLUCOSAMINE HCL/CHONDROITIN SU 500-400 MG
1 CAPSULE ORAL 4 TIMES DAILY
Qty: 120 EACH | Refills: 3 | Status: SHIPPED | OUTPATIENT
Start: 2023-11-08

## 2023-11-08 RX ORDER — LANCETS 30 GAUGE
1 EACH MISCELLANEOUS 4 TIMES DAILY
Qty: 120 EACH | Refills: 3 | Status: SHIPPED | OUTPATIENT
Start: 2023-11-08

## 2023-11-08 RX ORDER — BLOOD-GLUCOSE METER
1 KIT MISCELLANEOUS 4 TIMES DAILY
Qty: 1 EACH | Refills: 0 | Status: SHIPPED | OUTPATIENT
Start: 2023-11-08

## 2023-11-08 NOTE — TELEPHONE ENCOUNTER
----- Message from Salvador Carson MD sent at 11/8/2023  9:15 AM EST -----  Hilary, Appears to be diabetic - Random blood sugar was 172, HgA1c is 6.5%. Sending in supplies to check blood sugar 4 times per day. Will need to check Blood sugar fasting first thing in the morning (we want this below 90) and then 2 hours after each meal (we want this below 120).  Can you send her a sugar log to bring with her so we can review at next visit how her blood sugars are doing. Will also send to nutritionist to discuss diabetic diet.  So pharmacy for supplies, us for blood sugar log and diabetic eduction to help with diet/eduction.  Please let her know and help arrange- remaining prenatal labs have otherwise been normal, will review with follow up. Thanks, Dr. Carson

## 2023-11-08 NOTE — PROGRESS NOTES
Hilary, Appears to be diabetic - Random blood sugar was 172, HgA1c is 6.5%. Sending in supplies to check blood sugar 4 times per day. Will need to check Blood sugar fasting first thing in the morning (we want this below 90) and then 2 hours after each meal (we want this below 120).  Can you send her a sugar log to bring with her so we can review at next visit how her blood sugars are doing. Will also send to nutritionist to discuss diabetic diet.  So pharmacy for supplies, us for blood sugar log and diabetic eduction to help with diet/eduction.  Please let her know and help arrange- remaining prenatal labs have otherwise been normal, will review with follow up. Thanks, Dr. Carson

## 2023-11-08 NOTE — PROGRESS NOTES
Hilary, baseline testing - P/C is elevated at 1 g. Will see how that progresses over time. Thanks, Dr. Carson

## 2023-11-09 ENCOUNTER — TELEPHONE (OUTPATIENT)
Dept: OBSTETRICS AND GYNECOLOGY | Facility: CLINIC | Age: 38
End: 2023-11-09
Payer: MEDICARE

## 2023-11-09 RX ORDER — NITROFURANTOIN 25; 75 MG/1; MG/1
100 CAPSULE ORAL 2 TIMES DAILY
Qty: 14 CAPSULE | Refills: 0 | Status: SHIPPED | OUTPATIENT
Start: 2023-11-09 | End: 2023-11-16

## 2023-11-09 NOTE — TELEPHONE ENCOUNTER
----- Message from Salvador Carson MD sent at 11/9/2023 10:58 AM EST -----  Hilary, does have UTI- sending in antibiotic to start while awaiting ID and culture. Thanks, Dr. Carson

## 2023-11-09 NOTE — PROGRESS NOTES
Hilary, does have UTI- sending in antibiotic to start while awaiting ID and culture. Thanks, Dr. Carson

## 2023-11-10 ENCOUNTER — TELEPHONE (OUTPATIENT)
Dept: OBSTETRICS AND GYNECOLOGY | Facility: CLINIC | Age: 38
End: 2023-11-10
Payer: MEDICARE

## 2023-11-10 LAB
BACTERIA UR CULT: ABNORMAL
BACTERIA UR CULT: ABNORMAL
C TRACH RRNA SPEC QL NAA+PROBE: NEGATIVE
CYTOLOGIST CVX/VAG CYTO: NORMAL
CYTOLOGY CVX/VAG DOC CYTO: NORMAL
CYTOLOGY CVX/VAG DOC THIN PREP: NORMAL
DX ICD CODE: NORMAL
HIV 1 & 2 AB SER-IMP: NORMAL
HPV I/H RISK 4 DNA CVX QL PROBE+SIG AMP: NEGATIVE
N GONORRHOEA RRNA SPEC QL NAA+PROBE: NEGATIVE
OTHER ANTIBIOTIC SUSC ISLT: ABNORMAL
OTHER STN SPEC: NORMAL
STAT OF ADQ CVX/VAG CYTO-IMP: NORMAL
T VAGINALIS RRNA SPEC QL NAA+PROBE: POSITIVE

## 2023-11-10 RX ORDER — METRONIDAZOLE 500 MG/1
500 TABLET ORAL 2 TIMES DAILY
Qty: 14 TABLET | Refills: 0 | Status: SHIPPED | OUTPATIENT
Start: 2023-11-10 | End: 2023-11-17

## 2023-11-10 NOTE — PROGRESS NOTES
Hilary, pap and STD screen with trichomoniasis. I left voice mail to call office to review. Needs treatment following this.  Thanks, Dr. Carson

## 2023-11-10 NOTE — PROGRESS NOTES
Hilary, per notes aware of trichomoniasis - also her UTI is sensitive to macrobid.  So should finish antibiotic. Thanks, Dr. Carson

## 2023-11-10 NOTE — TELEPHONE ENCOUNTER
Patient aware of vaginal swab results, please send in medication for treatment, thanks!    Pharmacy on file confirmed.     Marci,     Rx sent     Thanks,   Dr. Carson      Current Outpatient Medications:     aspirin 81 MG chewable tablet, Chew 1 tablet Daily., Disp: 30 tablet, Rfl: 7    Glucose Blood (Blood Glucose Test) strip, 1 each by In Vitro route 4 (Four) Times a Day. Test fasting and 2 hours after meals, Disp: 120 each, Rfl: 3    glucose monitor monitoring kit, Use 1 each 4 (Four) Times a Day., Disp: 1 each, Rfl: 0    hydrOXYzine pamoate (VISTARIL) 25 MG capsule, Take 1 capsule by mouth 4 (Four) Times a Day As Needed for Anxiety., Disp: 120 capsule, Rfl: 4    Lancets misc, Use 1 each 4 (Four) Times a Day. Use to test fasting and 2 hours after meals, Disp: 120 each, Rfl: 3    metroNIDAZOLE (Flagyl) 500 MG tablet, Take 1 tablet by mouth 2 (Two) Times a Day for 7 days., Disp: 14 tablet, Rfl: 0    NIFEdipine XL (PROCARDIA XL) 30 MG 24 hr tablet, Take 2 tablets by mouth Daily., Disp: 30 tablet, Rfl: 2    nitrofurantoin, macrocrystal-monohydrate, (Macrobid) 100 MG capsule, Take 1 capsule by mouth 2 (Two) Times a Day for 7 days., Disp: 14 capsule, Rfl: 0    Prenatal 28-0.8 MG tablet, Take 1 tablet by mouth Daily. Please use formulary or generic, with DHA ideal, Disp: 90 each, Rfl: 3

## 2023-11-13 ENCOUNTER — HOSPITAL ENCOUNTER (OUTPATIENT)
Dept: DIABETES SERVICES | Facility: HOSPITAL | Age: 38
Discharge: HOME | End: 2023-11-13
Admitting: OBSTETRICS & GYNECOLOGY
Payer: MEDICARE

## 2023-11-13 PROCEDURE — G0108 DIAB MANAGE TRN  PER INDIV: HCPCS

## 2023-11-13 NOTE — NURSING NOTE
[facility]   Diabetes Management       Date:  2023    Patient:  Fany Brunson     MRN:  [id number]    Gestational age:  10w6d       Diagnosis:  Pre-existing diabetes mellitus during pregnancy in first trimester 024.311    Management Role:  Phase I:  Counseling and Education     Insulin dosing:   Enter insulin dosing  - no rxs found.     Summary   The patient was seen this afternoon for DM ed. This pt known to this RN from prior episode of GDM. Pt required insulin during that pregnancy -in 2018. Pt has started BG checks and has brought her BG log with ~4 days of BG results.  Blood glucose log was reviewed. Pt reports eating two to three meals/day and in fact has no post-prandial readings for dinners. All of available BGs are elevated. Discuss the use of insulin in pregnancy with pt. Pt appears disappointed at the prospect of not being able to control her BGs with diet alone. Review dz process. Review meal planning suggestions and encourage pt not to skip meals.   Patient has been encouraged to call our office with questions or additional education needs. The plan at this time is for pt to f/u with OB next week. This RN will notify referring OB of BG elevations at this point.  Thank you for this referral. Please don't hesitate to contact me with questions.     Referring provider:  Salvador Carson Md  [address] [place] [address] [address]  [place] [address]      Cindy Cornell, RN, BSN, [facility]  2023  14:27 EST

## 2023-11-14 ENCOUNTER — TELEPHONE (OUTPATIENT)
Dept: OBSTETRICS AND GYNECOLOGY | Facility: CLINIC | Age: 38
End: 2023-11-14
Payer: MEDICARE

## 2023-11-14 DIAGNOSIS — O24.419 GDM, CLASS A2: Primary | ICD-10-CM

## 2023-11-14 RX ORDER — DEXTROSE 5 G
15 TABLET,CHEWABLE ORAL AS NEEDED
Qty: 50 TABLET | Refills: 6 | Status: SHIPPED | OUTPATIENT
Start: 2023-11-14 | End: 2024-11-13

## 2023-11-14 RX ORDER — PEN NEEDLE, DIABETIC 32GX 5/32"
1 NEEDLE, DISPOSABLE MISCELLANEOUS 4 TIMES DAILY
Qty: 200 EACH | Refills: 1 | Status: SHIPPED | OUTPATIENT
Start: 2023-11-14

## 2023-11-14 RX ORDER — INSULIN LISPRO 100 [IU]/ML
INJECTION, SOLUTION INTRAVENOUS; SUBCUTANEOUS
Qty: 3 ML | Refills: 11 | Status: SHIPPED | OUTPATIENT
Start: 2023-11-14

## 2023-11-14 NOTE — TELEPHONE ENCOUNTER
called patient to schedule appt for GDM counseling this week with Galina. Pt needs to bring insulin with her to this appt.

## 2023-11-15 ENCOUNTER — TELEPHONE (OUTPATIENT)
Dept: OBSTETRICS AND GYNECOLOGY | Facility: CLINIC | Age: 38
End: 2023-11-15
Payer: MEDICARE

## 2023-11-15 RX ORDER — NIFEDIPINE 60 MG/1
60 TABLET, FILM COATED, EXTENDED RELEASE ORAL DAILY
Qty: 90 TABLET | Refills: 2 | Status: SHIPPED | OUTPATIENT
Start: 2023-11-15

## 2023-11-15 NOTE — TELEPHONE ENCOUNTER
----- Message from Salvador Carson MD sent at 11/10/2023  4:53 PM EST -----  Hilary, per notes aware of trichomoniasis - also her UTI is sensitive to macrobid.  So should finish antibiotic. Thanks, Dr. Carson

## 2023-11-15 NOTE — TELEPHONE ENCOUNTER
----- Message from Salvador Carson MD sent at 11/10/2023  1:15 PM EST -----  Hilary, pap and STD screen with trichomoniasis. I left voice mail to call office to review. Needs treatment following this.  Thanks, Dr. Carson

## 2023-11-15 NOTE — TELEPHONE ENCOUNTER
Zaida,     Updated Rx for pharmacy   Not sure issue    Then send to Hilary below.     Thanks,   Dr. Carson    ----- Message from Cindy Cornell, RN sent at 11/13/2023  2:44 PM EST -----  Hello Dr Carson and Galina, just a quick fyi that all of Fany's BGs so far are elevated over targets (highest pp 279.) in case you were able to move her up from next week. Our pharmacist let  me know Lantus, Humalog are covered for her w/$0 co pay. Thank you, Cindy (DM ed) [phone]    Hilary,     Looks like Galina seeing her Friday. Need to keep appointment next week and Friday with Galina     Please let her know    Thanks,   Dr. Carson

## 2023-11-15 NOTE — TELEPHONE ENCOUNTER
Spoke with Fany to let her know that Dr Carson sent in her Rx for Nifedipine to her [facility] at [address] [facility].

## 2023-11-17 ENCOUNTER — OFFICE VISIT (OUTPATIENT)
Dept: OBSTETRICS AND GYNECOLOGY | Facility: CLINIC | Age: 38
End: 2023-11-17
Payer: MEDICARE

## 2023-11-17 VITALS
WEIGHT: 246.4 LBS | DIASTOLIC BLOOD PRESSURE: 99 MMHG | BODY MASS INDEX: 42.07 KG/M2 | SYSTOLIC BLOOD PRESSURE: 172 MMHG | HEIGHT: 64 IN

## 2023-11-17 DIAGNOSIS — O24.414 INSULIN CONTROLLED GESTATIONAL DIABETES MELLITUS (GDM) IN THIRD TRIMESTER: ICD-10-CM

## 2023-11-17 DIAGNOSIS — Z3A.11 11 WEEKS GESTATION OF PREGNANCY: Primary | ICD-10-CM

## 2023-11-17 DIAGNOSIS — O10.913 CHRONIC HYPERTENSION WITH EXACERBATION DURING PREGNANCY IN THIRD TRIMESTER: ICD-10-CM

## 2023-11-17 RX ORDER — LANCETS 30 GAUGE
EACH MISCELLANEOUS
COMMUNITY
Start: 2023-11-08

## 2023-11-17 NOTE — PROGRESS NOTES
"Chief Complaint   Patient presents with    Gestational Diabetes     Patient is here today for GDM counseling. No concerns present.      OB follow up     Fany Brunson is a 38 y.o.  11w3d being seen today for her obstetrical visit.  She is here today for education on the management of diabetes in pregnancy as well as insulin administration. She has been seen by diabetic educators at the hospital previously, but states she was not instructed to change her diet. She does however have the common gestational diabetes packet supplied by diabetic educators and within this booklet there are direction for carbohydrate recommendations circled in marker. She reports GDM with 2 prior pregnancies, one of those requiring insulin. She does not keep a typical schedule as her  works a later shift and she waits up for him, her dinner is not typically until 3 or 4 AM.  Per diabetic educations more than half of her blood sugars were above normal range, therefore insulin was sent to her pharmacy.  Hx CHTN, at her OB visit this week her procardia was increased to 60 mg. She has not yet started this new dosing.     /99   Ht 162.6 cm (64.02\")   Wt 112 kg (246 lb 6.4 oz)   LMP 2023   BMI 42.27 kg/m²     Assessment/Plan  Diagnoses and all orders for this visit:    1. 11 weeks gestation of pregnancy (Primary)    2. Insulin controlled gestational diabetes mellitus (GDM) in third trimester    3. Chronic hypertension with exacerbation during pregnancy in third trimester      Discussed insulin uses and side effects. Reviewed how to administer and sharps disposal.  A1C 6.5 on 23  Given atypical daily schedule, encouraged to maintain the same times for breakfast, lunch, and dinner each day for glucose consistency    Counseled on dietary modifications and restrictions, risks of hyperglycemia on the pregnancy and for , life time risks for type 2 diabetes, discussed frequency of accuchecks. Reviewed goals of " fasting 60 - 90 and 2 hour postprandial< 120 throughout pregnancy, S&S of hypoglycemia, corrective action for hypoglycemia, sick day management, fetal kick counts, and exercise. Provided with resources and glucose log. Reviewed use of glucometer. All questions answered appropriately. Encouraged to call office with any further questions. Education material given. Instructed to call the office for glucose >180     Disc importance of good glycemic control in pregnancy. Disc risks associated with poorly or uncontrolled glucose including but not limited to excessive birth weight,  birth, lung immaturity, polyhydramnios,  hypoglycemia, stillbirth, etc.      Pt will send blood glucose log to me weekly through [employer] or via telephone.    BP elevated at 172/99. Advised to begin new procardia dosing and keep scheduled f/u in one week with Dr Carson    Follow up in 1 week(s) with Dr. Carson    I spent 32 minutes caring for Fany on this date of service. This time includes time spent by me in the following activities: preparing for the visit, reviewing tests, obtaining and/or reviewing a separately obtained history, performing a medically appropriate examination and/or evaluation, counseling and educating the patient/family/caregiver, referring and communicating with other health care professionals, and documenting information in the medical record    Galina Thomas, APRN  2023  15:51 EST

## 2023-11-21 ENCOUNTER — ROUTINE PRENATAL (OUTPATIENT)
Dept: OBSTETRICS AND GYNECOLOGY | Facility: CLINIC | Age: 38
End: 2023-11-21
Payer: MEDICARE

## 2023-11-21 VITALS — WEIGHT: 246 LBS | BODY MASS INDEX: 42.2 KG/M2 | SYSTOLIC BLOOD PRESSURE: 174 MMHG | DIASTOLIC BLOOD PRESSURE: 95 MMHG

## 2023-11-21 DIAGNOSIS — O99.211 MATERNAL MORBID OBESITY IN FIRST TRIMESTER, ANTEPARTUM: ICD-10-CM

## 2023-11-21 DIAGNOSIS — E66.01 MATERNAL MORBID OBESITY IN FIRST TRIMESTER, ANTEPARTUM: ICD-10-CM

## 2023-11-21 DIAGNOSIS — A59.01 TRICHOMONIASIS OF VAGINA: ICD-10-CM

## 2023-11-21 DIAGNOSIS — O23.41 URINARY TRACT INFECTION IN MOTHER DURING FIRST TRIMESTER OF PREGNANCY: ICD-10-CM

## 2023-11-21 DIAGNOSIS — O99.341 ANXIETY IN PREGNANCY IN FIRST TRIMESTER, ANTEPARTUM: ICD-10-CM

## 2023-11-21 DIAGNOSIS — O09.521 MULTIGRAVIDA OF ADVANCED MATERNAL AGE IN FIRST TRIMESTER: Primary | ICD-10-CM

## 2023-11-21 DIAGNOSIS — O10.011 PRE-EXISTING ESSENTIAL HYPERTENSION DURING PREGNANCY IN FIRST TRIMESTER: ICD-10-CM

## 2023-11-21 DIAGNOSIS — O24.311 PRE-EXISTING DIABETES MELLITUS DURING PREGNANCY IN FIRST TRIMESTER: ICD-10-CM

## 2023-11-21 DIAGNOSIS — F41.9 ANXIETY IN PREGNANCY IN FIRST TRIMESTER, ANTEPARTUM: ICD-10-CM

## 2023-11-21 DIAGNOSIS — O34.219 PREVIOUS CESAREAN DELIVERY, ANTEPARTUM: ICD-10-CM

## 2023-11-21 DIAGNOSIS — O09.41 SUPERVISION OF HIGH-RISK PREGNANCY WITH GRAND MULTIPARITY IN FIRST TRIMESTER: ICD-10-CM

## 2023-11-21 LAB
GLUCOSE UR STRIP-MCNC: NEGATIVE MG/DL
PROT UR STRIP-MCNC: ABNORMAL MG/DL

## 2023-11-21 RX ORDER — NIFEDIPINE 60 MG/1
60 TABLET, FILM COATED, EXTENDED RELEASE ORAL 2 TIMES DAILY
Qty: 90 TABLET | Refills: 2 | Status: SHIPPED | OUTPATIENT
Start: 2023-11-21

## 2023-11-21 RX ORDER — INSULIN LISPRO 100 [IU]/ML
14 INJECTION, SOLUTION INTRAVENOUS; SUBCUTANEOUS
Qty: 3 ML | Refills: 11 | Status: SHIPPED | OUTPATIENT
Start: 2023-11-21

## 2023-11-21 NOTE — PROGRESS NOTES
OB follow up     Fany Brunson is a 38 y.o.  12w0d being seen today for her obstetrical visit.  Patient reports no complaints. Fetal movement:  absent .    Her prenatal care is complicated by (and status): Chronic HTN, Diabetes perexhistant to pregnancy, Grand multip, prior , obesity, anxiety and UTI/trichomoniasis     Review of Systems  Cramping/contractions : none   Vaginal bleeding: none   Fetal movement too early     /95   Wt 112 kg (246 lb)   LMP 2023   BMI 42.20 kg/m²     Prenatal Assessment  Fetal Heart Rate: 156  Fundal Height (cm): 12 cm  Movement: Absent  Edema  LLE Edema: None  RLE Edema: None  Facial Edema: None        Assessment    Diagnoses and all orders for this visit:    1. Multigravida of advanced maternal age in first trimester (Primary)  -     POC Urinalysis Dipstick    2. Pre-existing diabetes mellitus during pregnancy in first trimester  -     Insulin Glargine (LANTUS SOLOSTAR) 100 UNIT/ML injection pen; Inject 24 Units under the skin into the appropriate area as directed Daily With Breakfast AND 14 Units every night at bedtime. I  Dispense: 100 mL; Refill: 3  -     Insulin Lispro, 1 Unit Dial, (HUMALOG) 100 UNIT/ML solution pen-injector; Inject 14 Units under the skin into the appropriate area as directed 3 (Three) Times a Day Before Meals.  Dispense: 3 mL; Refill: 11    3. Supervision of high-risk pregnancy with grand multiparity in first trimester    4. Previous  delivery, antepartum    5. Pre-existing essential hypertension during pregnancy in first trimester  -     Protein / Creatinine Ratio, Urine - Urine, Clean Catch  -     Urine Culture - , Urine, Clean Catch    6. Maternal morbid obesity in first trimester, antepartum    7. Anxiety in pregnancy in first trimester, antepartum    8. Urinary tract infection in mother during first trimester of pregnancy  -     Protein / Creatinine Ratio, Urine - Urine, Clean Catch  -     Urine Culture - , Urine,  Clean Catch    9. Trichomoniasis of vagina    Other orders  -     NIFEdipine CC (ADALAT CC) 60 MG 24 hr tablet; Take 1 tablet by mouth 2 (Two) Times a Day.  Dispense: 90 tablet; Refill: 2        1) pregnancy at 12w0d   Prenatal panel reviewed.   Pap normal   Urine and STD screens as below   2) pre-existent diabetes   HgA1c 6.5%, RBS > 150   Checking BS   On Insulin at this point   - started 3-4 days ago.   Fasting since 129-143,  Breakfast 128-147  Lunch 125-170  Dinner 145-180   Lantus - 20 AM, 10 PM - increase to 24, 14 AM/PM   Short 14 Am, 11 PM - 14 Breakfast, lunch, and dinner   3) AMA - Declined NIPT   On ASA  4) Grand multiparity   Risk factor   5) Prior    Timing per MFM, but may need early for prior thin lower segment.   6) Chronic HTN - uncontrolled   On ASA  BP still elevated on 30 mg XL x 2, so   Double to BID 60 mg   Recheck in 2 weeks,   Baseline P/C elevated, possibly by UTI, repeating   7) Obesity   Pre pregnancy BMI > 35   Overall/interval gain okay   8) anxiety in pregnancy   On vistaril   9) UTI with last visit   P/C elevated so repeat P/C, culture today   10) Trichomoniasis last visit.   Will need CAMILA, has treated     Plan    Reviewed this stage of pregnancy  Problem list updated   Follow up in 2 weeks    Salvador Carson MD   2023  15:10 EST

## 2023-11-22 ENCOUNTER — TELEPHONE (OUTPATIENT)
Dept: OBSTETRICS AND GYNECOLOGY | Facility: CLINIC | Age: 38
End: 2023-11-22
Payer: MEDICARE

## 2023-11-22 LAB
CREAT UR-MCNC: 46 MG/DL
PROT UR-MCNC: 17.4 MG/DL
PROT/CREAT UR: 378.3 MG/G CREA (ref 0–200)

## 2023-11-22 NOTE — TELEPHONE ENCOUNTER
----- Message from Salvador Carson MD sent at 11/22/2023  8:58 AM EST -----  Hilary, baseline repeated as had UTI and trichomoniasis last time. This is better. Thanks, Dr. Carson

## 2023-11-24 LAB
BACTERIA UR CULT: ABNORMAL

## 2023-11-27 ENCOUNTER — TELEPHONE (OUTPATIENT)
Dept: OBSTETRICS AND GYNECOLOGY | Facility: CLINIC | Age: 38
End: 2023-11-27
Payer: MEDICARE

## 2023-11-27 RX ORDER — AMPICILLIN 500 MG/1
500 CAPSULE ORAL 4 TIMES DAILY
Qty: 28 CAPSULE | Refills: 0 | Status: SHIPPED | OUTPATIENT
Start: 2023-11-27 | End: 2023-12-04

## 2023-11-27 NOTE — TELEPHONE ENCOUNTER
----- Message from Salvador Carson MD sent at 11/27/2023  8:06 AM EST -----  Hilary, first trimester with GBS UTI. Sending antibiotic to treat. Thanks, Dr. Carson

## 2023-12-05 ENCOUNTER — ROUTINE PRENATAL (OUTPATIENT)
Dept: OBSTETRICS AND GYNECOLOGY | Facility: CLINIC | Age: 38
End: 2023-12-05
Payer: MEDICARE

## 2023-12-05 VITALS — SYSTOLIC BLOOD PRESSURE: 177 MMHG | BODY MASS INDEX: 42.55 KG/M2 | DIASTOLIC BLOOD PRESSURE: 97 MMHG | WEIGHT: 248 LBS

## 2023-12-05 DIAGNOSIS — Z11.3 SCREEN FOR STD (SEXUALLY TRANSMITTED DISEASE): ICD-10-CM

## 2023-12-05 DIAGNOSIS — A59.01 TRICHOMONIASIS OF VAGINA: ICD-10-CM

## 2023-12-05 DIAGNOSIS — O09.41 SUPERVISION OF HIGH-RISK PREGNANCY WITH GRAND MULTIPARITY IN FIRST TRIMESTER: ICD-10-CM

## 2023-12-05 DIAGNOSIS — O99.341 ANXIETY IN PREGNANCY IN FIRST TRIMESTER, ANTEPARTUM: ICD-10-CM

## 2023-12-05 DIAGNOSIS — E66.01 MATERNAL MORBID OBESITY IN FIRST TRIMESTER, ANTEPARTUM: ICD-10-CM

## 2023-12-05 DIAGNOSIS — O99.211 MATERNAL MORBID OBESITY IN FIRST TRIMESTER, ANTEPARTUM: ICD-10-CM

## 2023-12-05 DIAGNOSIS — F41.9 ANXIETY IN PREGNANCY IN FIRST TRIMESTER, ANTEPARTUM: ICD-10-CM

## 2023-12-05 DIAGNOSIS — O09.521 MULTIGRAVIDA OF ADVANCED MATERNAL AGE IN FIRST TRIMESTER: Primary | ICD-10-CM

## 2023-12-05 DIAGNOSIS — O34.219 PREVIOUS CESAREAN DELIVERY, ANTEPARTUM: ICD-10-CM

## 2023-12-05 DIAGNOSIS — O24.311 PRE-EXISTING DIABETES MELLITUS DURING PREGNANCY IN FIRST TRIMESTER: ICD-10-CM

## 2023-12-05 DIAGNOSIS — O10.011 PRE-EXISTING ESSENTIAL HYPERTENSION DURING PREGNANCY IN FIRST TRIMESTER: ICD-10-CM

## 2023-12-05 LAB
GLUCOSE UR STRIP-MCNC: ABNORMAL MG/DL
PROT UR STRIP-MCNC: ABNORMAL MG/DL

## 2023-12-05 RX ORDER — INSULIN LISPRO 100 [IU]/ML
16 INJECTION, SOLUTION INTRAVENOUS; SUBCUTANEOUS
Qty: 3 ML | Refills: 11 | Status: SHIPPED | OUTPATIENT
Start: 2023-12-05 | End: 2023-12-06 | Stop reason: SDUPTHER

## 2023-12-05 RX ORDER — LABETALOL 200 MG/1
200 TABLET, FILM COATED ORAL 2 TIMES DAILY
Qty: 60 TABLET | Refills: 6 | Status: SHIPPED | OUTPATIENT
Start: 2023-12-05

## 2023-12-05 NOTE — PROGRESS NOTES
OB follow up     Fany Brunson is a 38 y.o.  14w0d being seen today for her obstetrical visit.  Patient reports no complaints. Fetal movement:  absent .    Her prenatal care is complicated by (and status): : Chronic HTN, Diabetes perexhistant to pregnancy, Grand multip, prior , obesity, anxiety and UTI/trichomoniasis     Review of Systems  Cramping/contractions : none   Vaginal bleeding: none   Fetal movement too early     /97   Wt 112 kg (248 lb)   LMP 2023   BMI 42.55 kg/m²     Prenatal Assessment  Fetal Heart Rate: 166  Fundal Height (cm): 14 cm  Movement: Absent  Edema  LLE Edema: None  RLE Edema: None  Facial Edema: None        Assessment    Diagnoses and all orders for this visit:    1. Multigravida of advanced maternal age in first trimester (Primary)  -     POC Urinalysis Dipstick    2. Pre-existing diabetes mellitus during pregnancy in first trimester  -     Insulin Glargine (LANTUS SOLOSTAR) 100 UNIT/ML injection pen; Inject 26 Units under the skin into the appropriate area as directed Daily With Breakfast AND 16 Units every night at bedtime. I  Dispense: 100 mL; Refill: 3  -     Insulin Lispro, 1 Unit Dial, (HUMALOG) 100 UNIT/ML solution pen-injector; Inject 16 Units under the skin into the appropriate area as directed 3 (Three) Times a Day Before Meals.  Dispense: 3 mL; Refill: 11    3. Supervision of high-risk pregnancy with grand multiparity in first trimester    4. Previous  delivery, antepartum    5. Pre-existing essential hypertension during pregnancy in first trimester    6. Maternal morbid obesity in first trimester, antepartum    7. Anxiety in pregnancy in first trimester, antepartum    8. Trichomoniasis of vagina  -     Chlamydia trachomatis, Neisseria gonorrhoeae, Trichomonas vaginalis, PCR - Swab, Cervix    9. Screen for STD (sexually transmitted disease)  -     Chlamydia trachomatis, Neisseria gonorrhoeae, Trichomonas vaginalis, PCR - Swab,  Cervix    Other orders  -     labetalol (NORMODYNE) 200 MG tablet; Take 1 tablet by mouth 2 (Two) Times a Day.  Dispense: 60 tablet; Refill: 6        1) pregnancy at 14w0d   Quickening reviewed   Flu shot recommended  Discussed NIPT, screening was declined   Anatomy scan for next visit at 20-21 wks   2) Pre gestational diabetes   On insulin but log reviewed and   Fasting 100-140  Post meals are also not in range  So going up today   Lantus - 26 AM, 16 PM  Short 16- with meals and fax.e-mail her log in one week   Follow up in 2 weeks to continue to improve treatment.   3) HTN  Chronic on procardia 60 mg XL BID and still significant elevation on max dose  Start labetalol to go up on this 200 mg BID today   ASA 81 mg   Baseline labs done, still with some borderline elevation in protein without UTI.   4) AMA  Declines NIPT   On ASA as above  5) grand multiparity   Risk factor   6) Prior    Adjust timing based on issues above and prior thin lower segment.   7) Obesity   Pre pregnancy BMI > 35   Overall/interval gains excessive   8) Anxiety in pregnancy   On vistaril   9) Trichomoniasis   Treated, CAMILA Today   10) UTI  Repeat culture with + GBS   On treatment at this time.     2) CAMILA today for Trich infection         Plan    Reviewed this stage of pregnancy  Problem list updated   Follow up in 2 weeks    Salvador Carson MD   2023  13:05 EST

## 2023-12-05 NOTE — Clinical Note
Viky, Pharmacy fax to adjust Rx for lispro. Update sent. Let pharmacy know. Thanks, Dr. Carson Not able to answer faxes??

## 2023-12-06 LAB
C TRACH RRNA SPEC QL NAA+PROBE: NEGATIVE
N GONORRHOEA RRNA SPEC QL NAA+PROBE: NEGATIVE
T VAGINALIS RRNA SPEC QL NAA+PROBE: NEGATIVE

## 2023-12-06 RX ORDER — INSULIN LISPRO 100 [IU]/ML
16 INJECTION, SOLUTION INTRAVENOUS; SUBCUTANEOUS
Qty: 15 ML | Refills: 11 | Status: SHIPPED | OUTPATIENT
Start: 2023-12-06

## 2023-12-07 ENCOUNTER — TELEPHONE (OUTPATIENT)
Dept: OBSTETRICS AND GYNECOLOGY | Facility: CLINIC | Age: 38
End: 2023-12-07
Payer: MEDICARE

## 2023-12-13 ENCOUNTER — TELEPHONE (OUTPATIENT)
Dept: OBSTETRICS AND GYNECOLOGY | Facility: CLINIC | Age: 38
End: 2023-12-13
Payer: MEDICARE

## 2023-12-13 DIAGNOSIS — O24.311 PRE-EXISTING DIABETES MELLITUS DURING PREGNANCY IN FIRST TRIMESTER: ICD-10-CM

## 2023-12-13 RX ORDER — INSULIN LISPRO 100 [IU]/ML
18 INJECTION, SOLUTION INTRAVENOUS; SUBCUTANEOUS
Qty: 15 ML | Refills: 11 | Status: SHIPPED | OUTPATIENT
Start: 2023-12-13

## 2023-12-13 NOTE — TELEPHONE ENCOUNTER
Zaida,     Adjustment made to her insulin     Lantus - 29 units AM, 18 units PM  Humalog -18 units with meals     Non-invasive test looks for fetal DNA and tells us if the most common chromosomal issues are in expected balance or not. So specifically looks at chromosome 13, 18, 21 and X, Y.  So by default it also can tell you gender of the baby.     Please let her know.     Thanks,   Dr. Carson      ----- Message from Zaida Elizabeth RN sent at 12/13/2023 10:44 AM EST -----  Regarding: Blood sugar readings/ question about noninvasive test  Contact: [phone]  My Chart. 15wk1d. OB 12/19/23. Blood sugar log. Thank you      ----- Message -----  From: Fany Brunson  Sent: 12/12/2023   5:05 PM EST  To: [facility]  Subject: Blood sugar readings/ question about noninva#                          Fasting.     Break.    Lunch.     Dinner    12/5/23.     133.            187.           98.            200  12/6/23.        125.           122.         150  12/7/23.       117.            125.         133.        111  12/8/23.       125.           135.           115  12/9/23.       111.            168.           177  12/10/23.     136.           123.          130  12/11/23.     100.           157.          129  12/12/23.     104.           149      Question about noninvasive test. What does it tell you? How is it performed? And does insurance cover the test?            Thank you          Fany Brunson

## 2023-12-19 ENCOUNTER — ROUTINE PRENATAL (OUTPATIENT)
Dept: OBSTETRICS AND GYNECOLOGY | Facility: CLINIC | Age: 38
End: 2023-12-19
Payer: MEDICARE

## 2023-12-19 VITALS — SYSTOLIC BLOOD PRESSURE: 138 MMHG | DIASTOLIC BLOOD PRESSURE: 85 MMHG | WEIGHT: 257 LBS | BODY MASS INDEX: 44.09 KG/M2

## 2023-12-19 DIAGNOSIS — A59.01 TRICHOMONIASIS OF VAGINA: ICD-10-CM

## 2023-12-19 DIAGNOSIS — O23.41 URINARY TRACT INFECTION IN MOTHER DURING FIRST TRIMESTER OF PREGNANCY: ICD-10-CM

## 2023-12-19 DIAGNOSIS — O24.112 PRE-EXISTING TYPE 2 DIABETES MELLITUS DURING PREGNANCY IN SECOND TRIMESTER: ICD-10-CM

## 2023-12-19 DIAGNOSIS — O09.522 AMA (ADVANCED MATERNAL AGE) MULTIGRAVIDA 35+, SECOND TRIMESTER: Primary | ICD-10-CM

## 2023-12-19 DIAGNOSIS — O34.219 PREVIOUS CESAREAN DELIVERY, ANTEPARTUM: ICD-10-CM

## 2023-12-19 DIAGNOSIS — F41.9 ANXIETY IN PREGNANCY IN SECOND TRIMESTER, ANTEPARTUM: ICD-10-CM

## 2023-12-19 DIAGNOSIS — O09.42 GRAND MULTIPARITY WITH CURRENT PREGNANCY IN SECOND TRIMESTER: ICD-10-CM

## 2023-12-19 DIAGNOSIS — O24.311 PRE-EXISTING DIABETES MELLITUS DURING PREGNANCY IN FIRST TRIMESTER: ICD-10-CM

## 2023-12-19 DIAGNOSIS — O99.342 ANXIETY IN PREGNANCY IN SECOND TRIMESTER, ANTEPARTUM: ICD-10-CM

## 2023-12-19 DIAGNOSIS — E66.01 MATERNAL MORBID OBESITY IN SECOND TRIMESTER, ANTEPARTUM: ICD-10-CM

## 2023-12-19 DIAGNOSIS — O10.012 PRE-EXISTING ESSENTIAL HYPERTENSION DURING PREGNANCY IN SECOND TRIMESTER: ICD-10-CM

## 2023-12-19 DIAGNOSIS — O99.212 MATERNAL MORBID OBESITY IN SECOND TRIMESTER, ANTEPARTUM: ICD-10-CM

## 2023-12-19 LAB
GLUCOSE UR STRIP-MCNC: NEGATIVE MG/DL
PROT UR STRIP-MCNC: NEGATIVE MG/DL

## 2023-12-19 RX ORDER — INSULIN LISPRO 100 [IU]/ML
20 INJECTION, SOLUTION INTRAVENOUS; SUBCUTANEOUS
Qty: 15 ML | Refills: 11 | Status: SHIPPED | OUTPATIENT
Start: 2023-12-19

## 2023-12-19 NOTE — PROGRESS NOTES
OB follow up     Fany Brunson is a 38 y.o.  16w0d being seen today for her obstetrical visit.  Patient reports no complaints. Fetal movement: normal.    Her prenatal care is complicated by (and status):  Chronic HTN, Diabetes perexhistant to pregnancy, Grand multip, prior , obesity, anxiety and UTI/trichomoniasis     Review of Systems  Cramping/contractions : none  Vaginal bleeding: none   Fetal movement present     /85   Wt 117 kg (257 lb)   LMP 2023   BMI 44.09 kg/m²     Prenatal Assessment  Fetal Heart Rate: 146  Fundal Height (cm): 16 cm  Movement: Present  Edema  LLE Edema: None  RLE Edema: None  Facial Edema: None        Assessment    Diagnoses and all orders for this visit:    1. AMA (advanced maternal age) multigravida 35+, second trimester (Primary)  -     POC Urinalysis Dipstick    2. Pre-existing type 2 diabetes mellitus during pregnancy in second trimester    3. Grand multiparity with current pregnancy in second trimester    4. Previous  delivery, antepartum    5. Pre-existing essential hypertension during pregnancy in second trimester    6. Maternal morbid obesity in second trimester, antepartum    7. Anxiety in pregnancy in second trimester, antepartum    8. Trichomoniasis of vagina    9. Urinary tract infection in mother during first trimester of pregnancy    10. Pre-existing diabetes mellitus during pregnancy in first trimester  -     Insulin Glargine (LANTUS SOLOSTAR) 100 UNIT/ML injection pen; Inject 32 Units under the skin into the appropriate area as directed Daily With Breakfast AND 20 Units every night at bedtime. I  Dispense: 100 mL; Refill: 3  -     Insulin Lispro, 1 Unit Dial, (HUMALOG) 100 UNIT/ML solution pen-injector; Inject 20 Units under the skin into the appropriate area as directed 3 (Three) Times a Day Before Meals.  Dispense: 15 mL; Refill: 11        1) pregnancy at 16w0d   2) Pre gestational diabetes   On insulin but log reviewed and    Fasting   Post meals are also not in range  Breakfast 109-187  Lunch   Dinner -   So going up today   Lantus - 32 AM, 20 PM (was 29, 18)  Short 20- with meals and (was 18)   fax.e-mail her log in one week   Follow up in 2 weeks to continue to improve treatment.   3) HTN, chronic and uncontrolled until today  Chronic on procardia 60 mg XL BID & labetalol 200 mg BID   ASA 81 mg   Baseline labs done, still with some borderline elevation in protein without UTI.   4) AMA  Declines NIPT   On ASA as above  5) grand multiparity   Risk factor   6) Prior    Adjust timing based on issues above and prior thin lower segment.   7) Obesity   Pre pregnancy BMI > 35   Overall/interval gains excessive   8) Anxiety in pregnancy   On vistaril   9) Trichomoniasis   Treated, CAMILA negative last visit   10) UTI  Repeat culture with + GBS   On treatment at this time.       Plan    Reviewed this stage of pregnancy  Problem list updated   Follow up in 2 weeks    Salvador Carson MD   2023  13:04 EST

## 2024-01-05 ENCOUNTER — ROUTINE PRENATAL (OUTPATIENT)
Dept: OBSTETRICS AND GYNECOLOGY | Facility: CLINIC | Age: 39
End: 2024-01-05
Payer: MEDICARE

## 2024-01-05 VITALS — SYSTOLIC BLOOD PRESSURE: 147 MMHG | WEIGHT: 255 LBS | DIASTOLIC BLOOD PRESSURE: 92 MMHG | BODY MASS INDEX: 43.75 KG/M2

## 2024-01-05 DIAGNOSIS — O10.012 PRE-EXISTING ESSENTIAL HYPERTENSION DURING PREGNANCY IN SECOND TRIMESTER: ICD-10-CM

## 2024-01-05 DIAGNOSIS — O99.342 ANXIETY IN PREGNANCY IN SECOND TRIMESTER, ANTEPARTUM: ICD-10-CM

## 2024-01-05 DIAGNOSIS — O23.41 URINARY TRACT INFECTION IN MOTHER DURING FIRST TRIMESTER OF PREGNANCY: ICD-10-CM

## 2024-01-05 DIAGNOSIS — O99.212 MATERNAL MORBID OBESITY IN SECOND TRIMESTER, ANTEPARTUM: ICD-10-CM

## 2024-01-05 DIAGNOSIS — A59.01 TRICHOMONIASIS OF VAGINA: ICD-10-CM

## 2024-01-05 DIAGNOSIS — E66.01 MATERNAL MORBID OBESITY IN SECOND TRIMESTER, ANTEPARTUM: ICD-10-CM

## 2024-01-05 DIAGNOSIS — Z36.89 ENCOUNTER FOR FETAL ANATOMIC SURVEY: ICD-10-CM

## 2024-01-05 DIAGNOSIS — O09.522 AMA (ADVANCED MATERNAL AGE) MULTIGRAVIDA 35+, SECOND TRIMESTER: Primary | ICD-10-CM

## 2024-01-05 DIAGNOSIS — O24.311 PRE-EXISTING DIABETES MELLITUS DURING PREGNANCY IN FIRST TRIMESTER: ICD-10-CM

## 2024-01-05 DIAGNOSIS — F41.9 ANXIETY IN PREGNANCY IN SECOND TRIMESTER, ANTEPARTUM: ICD-10-CM

## 2024-01-05 DIAGNOSIS — O34.219 PREVIOUS CESAREAN DELIVERY, ANTEPARTUM: ICD-10-CM

## 2024-01-05 DIAGNOSIS — O24.112 PRE-EXISTING TYPE 2 DIABETES MELLITUS DURING PREGNANCY IN SECOND TRIMESTER: ICD-10-CM

## 2024-01-05 DIAGNOSIS — O09.42 GRAND MULTIPARITY WITH CURRENT PREGNANCY IN SECOND TRIMESTER: ICD-10-CM

## 2024-01-05 LAB
GLUCOSE UR STRIP-MCNC: NEGATIVE MG/DL
PROT UR STRIP-MCNC: NEGATIVE MG/DL

## 2024-01-05 RX ORDER — INSULIN LISPRO 100 [IU]/ML
22 INJECTION, SOLUTION INTRAVENOUS; SUBCUTANEOUS
Qty: 15 ML | Refills: 11 | Status: SHIPPED | OUTPATIENT
Start: 2024-01-05

## 2024-01-05 NOTE — PROGRESS NOTES
OB follow up     Fany Brunson is a 38 y.o.  18w3d being seen today for her obstetrical visit.  Patient reports no complaints. Fetal movement: normal.    Her prenatal care is complicated by (and status): Chronic HTN, Diabetes perexhistant to pregnancy, Grand multip, prior , obesity, anxiety and UTI/trichomoniasis     Review of Systems  Cramping/contractions : none   Vaginal bleeding: none   Fetal movement good     /92   Wt 116 kg (255 lb)   LMP 2023   BMI 43.75 kg/m²     Prenatal Assessment  Fetal Heart Rate: 154  Fundal Height (cm): 18 cm  Movement: Present  Edema  LLE Edema: None  RLE Edema: None  Facial Edema: None        Assessment    Diagnoses and all orders for this visit:    1. AMA (advanced maternal age) multigravida 35+, second trimester (Primary)  -     POC Urinalysis Dipstick    2. Pre-existing type 2 diabetes mellitus during pregnancy in second trimester    3. Grand multiparity with current pregnancy in second trimester    4. Previous  delivery, antepartum    5. Pre-existing essential hypertension during pregnancy in second trimester  -     Protein / Creatinine Ratio, Urine - Urine, Clean Catch    6. Maternal morbid obesity in second trimester, antepartum    7. Anxiety in pregnancy in second trimester, antepartum    8. Trichomoniasis of vagina    9. Urinary tract infection in mother during first trimester of pregnancy  -     Urine Culture - , Urine, Clean Catch    10. Encounter for fetal anatomic survey  -     US Ob 14 + Weeks Single or First Gestation; Future    11. Pre-existing diabetes mellitus during pregnancy in first trimester  -     Insulin Lispro, 1 Unit Dial, (HUMALOG) 100 UNIT/ML solution pen-injector; Inject 22 Units under the skin into the appropriate area as directed 3 (Three) Times a Day Before Meals.  Dispense: 15 mL; Refill: 11  -     Insulin Glargine (LANTUS SOLOSTAR) 100 UNIT/ML injection pen; Inject 35 Units under the skin into the appropriate  area as directed Daily With Breakfast AND 22 Units every night at bedtime. I  Dispense: 100 mL; Refill: 3        1) pregnancy at 18w3d   2) Pre gestational diabetes   On insulin but log reviewed and   Fasting   Post meals are also not in range  Breakfast    Lunch   Dinner - 100-189  So going up today   Lantus - 35 AM, 22 PM (was 32, 20)  Short 22- with meals and (was 18)   fax.e-mail her log in one week   Follow up in 2 weeks to continue to improve treatment.   3) HTN, chronic borderline today, but better than initial  Chronic on procardia 60 mg XL BID & labetalol 200 mg BID   ASA 81 mg   Baseline labs done, still with some borderline elevation in protein without UTI.   Repeating p/c today   4) AMA  Declines NIPT   On ASA as above  5) grand multiparity   Risk factor   6) Prior    Adjust timing based on issues above and prior thin lower segment.   7) Obesity   Pre pregnancy BMI > 35   Overall/interval gains excessive   8) Anxiety in pregnancy   On vistaril   9) Trichomoniasis   Treated, CAMILA negative last visit   10) UTI  Repeat culture with + GBS   Will repeat culture again today       Plan    Reviewed this stage of pregnancy  Problem list updated   Follow up in 2 week with anatomic survey     Salvador Carson MD   2024  12:06 EST

## 2024-01-06 LAB
CREAT UR-MCNC: 44.7 MG/DL
PROT UR-MCNC: 9.2 MG/DL
PROT/CREAT UR: 205.8 MG/G CREA (ref 0–200)

## 2024-01-08 ENCOUNTER — TELEPHONE (OUTPATIENT)
Dept: OBSTETRICS AND GYNECOLOGY | Facility: CLINIC | Age: 39
End: 2024-01-08
Payer: MEDICARE

## 2024-01-08 NOTE — TELEPHONE ENCOUNTER
----- Message from Salvador Carson MD sent at 1/8/2024  8:25 AM EST -----  Hilary, improved when not having active UTI it appears. Thanks, Dr. Carson

## 2024-01-09 LAB
BACTERIA UR CULT: ABNORMAL
BACTERIA UR CULT: ABNORMAL
OTHER ANTIBIOTIC SUSC ISLT: ABNORMAL

## 2024-01-11 ENCOUNTER — TELEPHONE (OUTPATIENT)
Dept: OBSTETRICS AND GYNECOLOGY | Facility: CLINIC | Age: 39
End: 2024-01-11
Payer: MEDICARE

## 2024-01-11 NOTE — TELEPHONE ENCOUNTER
----- Message from Salvador Carson MD sent at 1/10/2024 12:49 PM EST -----  Hilary, UTI seen again on urine culture. Rx sent to treat. Needs to possibly go on daily antibiotic to prevent once that is finished. Please let her know. Sent both to pharmacy one for one week to treat and then start daily to prevent. Thanks, Dr. Carson

## 2024-01-16 DIAGNOSIS — O24.419 GDM, CLASS A2: ICD-10-CM

## 2024-01-17 RX ORDER — PEN NEEDLE, DIABETIC 32GX 5/32"
NEEDLE, DISPOSABLE MISCELLANEOUS
Qty: 300 EACH | Refills: 1 | Status: SHIPPED | OUTPATIENT
Start: 2024-01-17

## 2024-01-19 ENCOUNTER — ROUTINE PRENATAL (OUTPATIENT)
Dept: OBSTETRICS AND GYNECOLOGY | Facility: CLINIC | Age: 39
End: 2024-01-19
Payer: MEDICARE

## 2024-01-19 VITALS — BODY MASS INDEX: 44.44 KG/M2 | DIASTOLIC BLOOD PRESSURE: 89 MMHG | WEIGHT: 259 LBS | SYSTOLIC BLOOD PRESSURE: 172 MMHG

## 2024-01-19 DIAGNOSIS — O23.42 RECURRENT URINARY TRACT INFECTION AFFECTING PREGNANCY IN SECOND TRIMESTER: ICD-10-CM

## 2024-01-19 DIAGNOSIS — O09.42 GRAND MULTIPARITY WITH CURRENT PREGNANCY IN SECOND TRIMESTER: ICD-10-CM

## 2024-01-19 DIAGNOSIS — O09.522 AMA (ADVANCED MATERNAL AGE) MULTIGRAVIDA 35+, SECOND TRIMESTER: Primary | ICD-10-CM

## 2024-01-19 DIAGNOSIS — O10.012 PRE-EXISTING ESSENTIAL HYPERTENSION DURING PREGNANCY IN SECOND TRIMESTER: ICD-10-CM

## 2024-01-19 DIAGNOSIS — O99.342 ANXIETY IN PREGNANCY IN SECOND TRIMESTER, ANTEPARTUM: ICD-10-CM

## 2024-01-19 DIAGNOSIS — A59.01 TRICHOMONIASIS OF VAGINA: ICD-10-CM

## 2024-01-19 DIAGNOSIS — O34.219 PREVIOUS CESAREAN DELIVERY, ANTEPARTUM: ICD-10-CM

## 2024-01-19 DIAGNOSIS — F41.9 ANXIETY IN PREGNANCY IN SECOND TRIMESTER, ANTEPARTUM: ICD-10-CM

## 2024-01-19 DIAGNOSIS — E66.01 MATERNAL MORBID OBESITY IN SECOND TRIMESTER, ANTEPARTUM: ICD-10-CM

## 2024-01-19 DIAGNOSIS — Z13.79 ENCOUNTER FOR GENETIC SCREENING FOR DOWN SYNDROME: ICD-10-CM

## 2024-01-19 DIAGNOSIS — O99.212 MATERNAL MORBID OBESITY IN SECOND TRIMESTER, ANTEPARTUM: ICD-10-CM

## 2024-01-19 DIAGNOSIS — O24.312 PRE-EXISTING DIABETES MELLITUS DURING PREGNANCY IN SECOND TRIMESTER: ICD-10-CM

## 2024-01-19 LAB
GLUCOSE UR STRIP-MCNC: NEGATIVE MG/DL
PROT UR STRIP-MCNC: NEGATIVE MG/DL

## 2024-01-19 NOTE — PROGRESS NOTES
OB follow up     Fany Brunson is a 39 y.o.  20w3d being seen today for her obstetrical visit.  Patient reports no complaints. Fetal movement: normal.    Her prenatal care is complicated by (and status): Chronic HTN, Diabetes perexhistant to pregnancy, Grand multip, prior , obesity, anxiety and UTI/trichomoniasis        Review of Systems  Cramping/contractions : none   Vaginal bleeding: none   Fetal movement good     /89   Wt 117 kg (259 lb)   LMP 2023   BMI 44.44 kg/m²     Prenatal Assessment  Fetal Heart Rate: 156  Fundal Height (cm): 20 cm  Movement: Present  Presentation: Transverse  Edema  LLE Edema: None  RLE Edema: None  Facial Edema: None        Assessment    Diagnoses and all orders for this visit:    1. AMA (advanced maternal age) multigravida 35+, second trimester (Primary)  -     POC Urinalysis Dipstick  -     [id number] PLUS Core+SCA - Blood,    2. Pre-existing diabetes mellitus during pregnancy in second trimester  -     Insulin Glargine (LANTUS SOLOSTAR) 100 UNIT/ML injection pen; Inject 35 Units under the skin into the appropriate area as directed Daily With Breakfast AND 25 Units every night at bedtime. I  Dispense: 100 mL; Refill: 3    3. Grand multiparity with current pregnancy in second trimester    4. Previous  delivery, antepartum    5. Pre-existing essential hypertension during pregnancy in second trimester    6. Maternal morbid obesity in second trimester, antepartum    7. Anxiety in pregnancy in second trimester, antepartum    8. Trichomoniasis of vagina    9. Recurrent urinary tract infection affecting pregnancy in second trimester    10. Encounter for genetic screening for Down Syndrome  -     [id number] PLUS Core+SCA - Blood,        1) pregnancy at 20w3d   Anatomy scan today - reassuring with good cervical length   2) Pre gestational diabetes   On insulin but log reviewed and   Fasting   Post meals are also not in range  Breakfast 107-143      Lunch   Dinner -   So going up today   Lantus - 35 AM, 25 PM (was 35, 22)  Short 22- with meals and (was 22)   3) HTN, chronic borderline today elevated systolic, has not taken medication this AM   Chronic on procardia 60 mg XL BID & labetalol 200 mg BID   ASA 81 mg   Baseline labs done, still with some borderline elevation in protein without UTI.   4) AMA  Requested NIPT today - expectations reviewed   On ASA as above  5) grand multiparity   Risk factor   6) Prior    Adjust timing based on issues above and prior thin lower segment.   7) Obesity   Pre pregnancy BMI > 35   Overall/interval gains excessive   8) Anxiety in pregnancy   On vistaril   9) Trichomoniasis   Treated, CAMILA negative  10) UTI, recurrent on suppression   To continue daily suppression     Plan    Reviewed this stage of pregnancy  Problem list updated   Follow up in 2 weeks    Salvador Carson MD   2024  10:41 EST

## 2024-01-25 LAB
CFDNA.FET/CFDNA.TOTAL SFR FETUS: NORMAL %
CITATION REF LAB TEST: NORMAL
FET 13+18+21+X+Y ANEUP PLAS.CFDNA: NEGATIVE
FET CHR 21 TS PLAS.CFDNA QL: NEGATIVE
FET MS X RISK WBC.DNA+CFDNA QL: NOT DETECTED
FET SEX PLAS.CFDNA DOSAGE CFDNA: NORMAL
FET TS 13 RISK PLAS.CFDNA QL: NEGATIVE
FET TS 18 RISK WBC.DNA+CFDNA QL: NEGATIVE
FET X + Y ANEUP RISK PLAS.CFDNA SEQ-IMP: NOT DETECTED
GA EST FROM CONCEPTION DATE: NORMAL D
GESTATIONAL AGE > 9:: YES
LAB DIRECTOR NAME PROVIDER: NORMAL
LAB DIRECTOR NAME PROVIDER: NORMAL
LABORATORY COMMENT REPORT: NORMAL
LIMITATIONS OF THE TEST: NORMAL
NEGATIVE PREDICTIVE VALUE: NORMAL
NOTE: NORMAL
PERFORMANCE CHARACTERISTICS: NORMAL
POSITIVE PREDICTIVE VALUE: NORMAL
REF LAB TEST METHOD: NORMAL
TEST PERFORMANCE INFO SPEC: NORMAL

## 2024-01-29 ENCOUNTER — TELEPHONE (OUTPATIENT)
Dept: OBSTETRICS AND GYNECOLOGY | Facility: CLINIC | Age: 39
End: 2024-01-29
Payer: MEDICARE

## 2024-01-29 RX ORDER — SWAB
1 SWAB, NON-MEDICATED MISCELLANEOUS DAILY
Qty: 90 EACH | Refills: 3 | Status: SHIPPED | OUTPATIENT
Start: 2024-01-29

## 2024-01-29 NOTE — TELEPHONE ENCOUNTER
----- Message from Salvador Carson MD sent at 1/25/2024  6:32 PM EST -----  Hilary, We did NIPT for aneuploidy screen, most commonly down syndrome. This screening test was negative for concerns with chromosomal balance in chromosomes 13,18, 21, X & Y. If she wants to know gender, that is available on the test. Thanks Dr. Carson

## 2024-02-06 ENCOUNTER — ROUTINE PRENATAL (OUTPATIENT)
Dept: OBSTETRICS AND GYNECOLOGY | Facility: CLINIC | Age: 39
End: 2024-02-06
Payer: MEDICARE

## 2024-02-06 VITALS — BODY MASS INDEX: 43.58 KG/M2 | WEIGHT: 254 LBS | DIASTOLIC BLOOD PRESSURE: 91 MMHG | SYSTOLIC BLOOD PRESSURE: 154 MMHG

## 2024-02-06 DIAGNOSIS — O99.342 ANXIETY IN PREGNANCY IN SECOND TRIMESTER, ANTEPARTUM: ICD-10-CM

## 2024-02-06 DIAGNOSIS — O09.42 GRAND MULTIPARITY WITH CURRENT PREGNANCY IN SECOND TRIMESTER: ICD-10-CM

## 2024-02-06 DIAGNOSIS — A59.01 TRICHOMONIASIS OF VAGINA: ICD-10-CM

## 2024-02-06 DIAGNOSIS — O24.312 PRE-EXISTING DIABETES MELLITUS DURING PREGNANCY IN SECOND TRIMESTER: ICD-10-CM

## 2024-02-06 DIAGNOSIS — Z36.9 ANTENATAL SCREENING ENCOUNTER: ICD-10-CM

## 2024-02-06 DIAGNOSIS — O09.522 AMA (ADVANCED MATERNAL AGE) MULTIGRAVIDA 35+, SECOND TRIMESTER: Primary | ICD-10-CM

## 2024-02-06 DIAGNOSIS — F41.9 ANXIETY IN PREGNANCY IN SECOND TRIMESTER, ANTEPARTUM: ICD-10-CM

## 2024-02-06 DIAGNOSIS — O99.212 MATERNAL MORBID OBESITY IN SECOND TRIMESTER, ANTEPARTUM: ICD-10-CM

## 2024-02-06 DIAGNOSIS — O23.42 RECURRENT URINARY TRACT INFECTION AFFECTING PREGNANCY IN SECOND TRIMESTER: ICD-10-CM

## 2024-02-06 DIAGNOSIS — E66.01 MATERNAL MORBID OBESITY IN SECOND TRIMESTER, ANTEPARTUM: ICD-10-CM

## 2024-02-06 DIAGNOSIS — O34.219 PREVIOUS CESAREAN DELIVERY, ANTEPARTUM: ICD-10-CM

## 2024-02-06 DIAGNOSIS — O10.012 PRE-EXISTING ESSENTIAL HYPERTENSION DURING PREGNANCY IN SECOND TRIMESTER: ICD-10-CM

## 2024-02-06 LAB
GLUCOSE UR STRIP-MCNC: NEGATIVE MG/DL
PROT UR STRIP-MCNC: NEGATIVE MG/DL

## 2024-02-06 RX ORDER — LABETALOL 300 MG/1
300 TABLET, FILM COATED ORAL 2 TIMES DAILY
Qty: 60 TABLET | Refills: 5 | Status: SHIPPED | OUTPATIENT
Start: 2024-02-06

## 2024-02-06 NOTE — PROGRESS NOTES
OB follow up     Fany Brunson is a 39 y.o.  23w0d being seen today for her obstetrical visit.  Patient reports no complaints. Fetal movement: normal.    Her prenatal care is complicated by (and status):  Chronic HTN, Diabetes perexhistant to pregnancy, Grand multip, prior , obesity, anxiety and UTI/trichomoniasis     Review of Systems  Cramping/contractions : none   Vaginal bleeding: none   Fetal movement good     /91   Wt 115 kg (254 lb)   LMP 2023   BMI 43.58 kg/m²     Prenatal Assessment  Fetal Heart Rate: 144  Fundal Height (cm): 23 cm  Movement: Present  Edema  LLE Edema: None  RLE Edema: None  Facial Edema: None        Assessment    Diagnoses and all orders for this visit:    1. AMA (advanced maternal age) multigravida 35+, second trimester (Primary)  -     POC Urinalysis Dipstick    2. Pre-existing diabetes mellitus during pregnancy in second trimester  -     US Ob Follow Up Transabdominal Approach; Future  -     Comprehensive Metabolic Panel; Future  -     Hemoglobin A1c; Future  -     Insulin Glargine (LANTUS SOLOSTAR) 100 UNIT/ML injection pen; Inject 35 Units under the skin into the appropriate area as directed Daily With Breakfast AND 27 Units every night at bedtime. I  Dispense: 100 mL; Refill: 3    3. Grand multiparity with current pregnancy in second trimester    4. Previous  delivery, antepartum    5. Pre-existing essential hypertension during pregnancy in second trimester  -     Protein / Creatinine Ratio, Urine - Urine, Clean Catch; Future  -     US Ob Follow Up Transabdominal Approach; Future  -     CBC & Differential; Future  -     Comprehensive Metabolic Panel; Future    6. Maternal morbid obesity in second trimester, antepartum    7. Anxiety in pregnancy in second trimester, antepartum    8. Trichomoniasis of vagina    9. Recurrent urinary tract infection affecting pregnancy in second trimester  -     Urine Culture - , Urine, Clean Catch; Future  -      Protein / Creatinine Ratio, Urine - Urine, Clean Catch; Future    10.  screening encounter  -     T Pallidum Antibody w/ reflex RPR; Future  -     CBC & Differential; Future    Other orders  -     labetalol (NORMODYNE) 300 MG tablet; Take 1 tablet by mouth 2 (Two) Times a Day.  Dispense: 60 tablet; Refill: 5        1) pregnancy at 23w0d   Syphilis screen towards end of second trimester/Early third trimester with anemia screen   2) Pre gestational diabetes - improving, but not controlled   On insulin but log reviewed and   Fasting   Post meals are also not in range  Breakfast   Lunch   Dinner -    Was - Lantus - 35 AM, 25 PM   Short 22- with meals and   Will increase PM long acting to 27   3) HTN, chronic borderline -improving but not controlled   Chronic on procardia 60 mg XL BID & labetalol 200 mg BID   Increase Labetalol to 300 mg BID   ASA 81 mg   Baseline labs done  Has significant proteinuria   Growth with next visit.   4) AMA - Normal NIPT   On ASA  5) grand multiparity   Risk factor   6) Prior    Adjust timing based on issues above and prior thin lower segment.   7) Obesity   Pre pregnancy BMI > 35   Overall/interval gains excessive   8) Anxiety in pregnancy   On vistaril   9) Trichomoniasis   Treated, CAMILA negative  10) UTI, recurrent on suppression   To continue daily suppression     Plan    Reviewed this stage of pregnancy  Problem list updated   Follow up in 2-3 weeks with growth scan     Salvador Carson MD   2024  09:55 EST

## 2024-02-20 ENCOUNTER — ROUTINE PRENATAL (OUTPATIENT)
Dept: OBSTETRICS AND GYNECOLOGY | Facility: CLINIC | Age: 39
End: 2024-02-20
Payer: MEDICARE

## 2024-02-20 VITALS — WEIGHT: 260 LBS | DIASTOLIC BLOOD PRESSURE: 86 MMHG | BODY MASS INDEX: 44.61 KG/M2 | SYSTOLIC BLOOD PRESSURE: 150 MMHG

## 2024-02-20 DIAGNOSIS — O09.42 GRAND MULTIPARITY WITH CURRENT PREGNANCY IN SECOND TRIMESTER: ICD-10-CM

## 2024-02-20 DIAGNOSIS — F41.9 ANXIETY IN PREGNANCY IN SECOND TRIMESTER, ANTEPARTUM: ICD-10-CM

## 2024-02-20 DIAGNOSIS — O34.219 PREVIOUS CESAREAN DELIVERY, ANTEPARTUM: ICD-10-CM

## 2024-02-20 DIAGNOSIS — O24.312 PRE-EXISTING DIABETES MELLITUS DURING PREGNANCY IN SECOND TRIMESTER: ICD-10-CM

## 2024-02-20 DIAGNOSIS — E66.01 MATERNAL MORBID OBESITY IN SECOND TRIMESTER, ANTEPARTUM: ICD-10-CM

## 2024-02-20 DIAGNOSIS — O99.212 MATERNAL MORBID OBESITY IN SECOND TRIMESTER, ANTEPARTUM: ICD-10-CM

## 2024-02-20 DIAGNOSIS — O09.522 AMA (ADVANCED MATERNAL AGE) MULTIGRAVIDA 35+, SECOND TRIMESTER: Primary | ICD-10-CM

## 2024-02-20 DIAGNOSIS — O10.012 PRE-EXISTING ESSENTIAL HYPERTENSION DURING PREGNANCY IN SECOND TRIMESTER: ICD-10-CM

## 2024-02-20 DIAGNOSIS — O99.342 ANXIETY IN PREGNANCY IN SECOND TRIMESTER, ANTEPARTUM: ICD-10-CM

## 2024-02-20 DIAGNOSIS — A59.01 TRICHOMONIASIS OF VAGINA: ICD-10-CM

## 2024-02-20 DIAGNOSIS — O23.42 RECURRENT URINARY TRACT INFECTION AFFECTING PREGNANCY IN SECOND TRIMESTER: ICD-10-CM

## 2024-02-20 LAB
GLUCOSE UR STRIP-MCNC: NEGATIVE MG/DL
PROT UR STRIP-MCNC: NEGATIVE MG/DL

## 2024-02-20 RX ORDER — CHOLECALCIFEROL (VITAMIN D3) 50 MCG
TABLET ORAL
COMMUNITY
Start: 2024-02-02

## 2024-02-20 NOTE — PROGRESS NOTES
OB follow up     Fany Brunson is a 39 y.o.  25w0d being seen today for her obstetrical visit.  Patient reports no complaints. Fetal movement: normal.    Her prenatal care is complicated by (and status): Chronic HTN, Diabetes perexhistant to pregnancy, Grand multip, prior , obesity, anxiety and UTI/trichomoniasis     Review of Systems  Cramping/contractions : none   Vaginal bleeding: none   Fetal movement good     /86   Wt 118 kg (260 lb)   LMP 2023   BMI 44.61 kg/m²     Prenatal Assessment  Fetal Heart Rate: 135  Fundal Height (cm): 25 cm  Movement: Present  Presentation: Transverse  Edema  LLE Edema: None  RLE Edema: None  Facial Edema: None        Assessment    Diagnoses and all orders for this visit:    1. AMA (advanced maternal age) multigravida 35+, second trimester (Primary)  -     POC Urinalysis Dipstick    2. Pre-existing diabetes mellitus during pregnancy in second trimester    3. Pre-existing essential hypertension during pregnancy in second trimester    4. Grand multiparity with current pregnancy in second trimester    5. Previous  delivery, antepartum    6. Maternal morbid obesity in second trimester, antepartum    7. Anxiety in pregnancy in second trimester, antepartum    8. Trichomoniasis of vagina    9. Recurrent urinary tract infection affecting pregnancy in second trimester        1) pregnancy at 25w0d   First time - her overall situation looks good   BP and DM getting more in range, and her growth AGA  But will continue to need close follow up   2) Pre gestational diabetes - improving  On insulin but log reviewed and   Fasting 80's, one or two at 90's.   Post meals are also in range  Lantus - 35 AM, 27 PM   Short 22- with meals and   So for first time to keep insulin the same for now.   Serial growth done  Transverse, MVP normal, AGA - 48%ile, A/C 55%ile  3) HTN, chronic borderline -improving   Chronic on procardia 60 mg XL BID & labetalol 300 mg BID   ASA  81 mg   Baseline labs done  Has significant proteinuria   4) AMA - Normal NIPT   On ASA  5) grand multiparity   Risk factor   6) Prior    Adjust timing based on issues above and prior thin lower segment.   7) Obesity   Pre pregnancy BMI > 35   Overall/interval gains excessive   8) Anxiety in pregnancy   On vistaril   9) Trichomoniasis   Treated, CAMILA negative  10) UTI, recurrent on suppression   To continue daily suppression   Trying to repeat to get negative culture.     Plan    Reviewed this stage of pregnancy  Problem list updated   Follow up in 2 weeks    Salvador Carson MD   2024  10:07 EST

## 2024-02-22 ENCOUNTER — TELEPHONE (OUTPATIENT)
Dept: OBSTETRICS AND GYNECOLOGY | Facility: CLINIC | Age: 39
End: 2024-02-22
Payer: MEDICARE

## 2024-02-22 NOTE — TELEPHONE ENCOUNTER
----- Message from Salvador Carson MD sent at 2/21/2024  8:51 AM EST -----  Hilary, not anemic. Her BS is okay on CMP, HgA1c is great at 5.6% (normal range). Syphilis pending, otherwise good. Thanks, Dr. Carson

## 2024-02-22 NOTE — TELEPHONE ENCOUNTER
----- Message from Salvador Carson MD sent at 2/22/2024  9:57 AM EST -----  Hilary, Syphilis screen negative. Thanks, Dr. Carson

## 2024-03-05 ENCOUNTER — ROUTINE PRENATAL (OUTPATIENT)
Dept: OBSTETRICS AND GYNECOLOGY | Facility: CLINIC | Age: 39
End: 2024-03-05
Payer: MEDICARE

## 2024-03-05 VITALS — BODY MASS INDEX: 44.78 KG/M2 | SYSTOLIC BLOOD PRESSURE: 130 MMHG | WEIGHT: 261 LBS | DIASTOLIC BLOOD PRESSURE: 76 MMHG

## 2024-03-05 DIAGNOSIS — O23.42 RECURRENT URINARY TRACT INFECTION AFFECTING PREGNANCY IN SECOND TRIMESTER: ICD-10-CM

## 2024-03-05 DIAGNOSIS — O09.42 GRAND MULTIPARITY WITH CURRENT PREGNANCY IN SECOND TRIMESTER: ICD-10-CM

## 2024-03-05 DIAGNOSIS — O99.212 MATERNAL MORBID OBESITY IN SECOND TRIMESTER, ANTEPARTUM: ICD-10-CM

## 2024-03-05 DIAGNOSIS — O34.219 PREVIOUS CESAREAN DELIVERY, ANTEPARTUM: ICD-10-CM

## 2024-03-05 DIAGNOSIS — E66.01 MATERNAL MORBID OBESITY IN SECOND TRIMESTER, ANTEPARTUM: ICD-10-CM

## 2024-03-05 DIAGNOSIS — O24.312 PRE-EXISTING DIABETES MELLITUS DURING PREGNANCY IN SECOND TRIMESTER: ICD-10-CM

## 2024-03-05 DIAGNOSIS — O09.522 AMA (ADVANCED MATERNAL AGE) MULTIGRAVIDA 35+, SECOND TRIMESTER: Primary | ICD-10-CM

## 2024-03-05 DIAGNOSIS — O10.012 PRE-EXISTING ESSENTIAL HYPERTENSION DURING PREGNANCY IN SECOND TRIMESTER: ICD-10-CM

## 2024-03-05 LAB
GLUCOSE UR STRIP-MCNC: NEGATIVE MG/DL
PROT UR STRIP-MCNC: NEGATIVE MG/DL

## 2024-03-05 PROCEDURE — 0502F SUBSEQUENT PRENATAL CARE: CPT | Performed by: OBSTETRICS & GYNECOLOGY

## 2024-03-05 RX ORDER — SODIUM CHLORIDE 0.9 % (FLUSH) 0.9 %
10 SYRINGE (ML) INJECTION EVERY 12 HOURS SCHEDULED
OUTPATIENT
Start: 2024-03-05

## 2024-03-05 RX ORDER — METHYLERGONOVINE MALEATE 0.2 MG/ML
200 INJECTION INTRAVENOUS ONCE AS NEEDED
OUTPATIENT
Start: 2024-03-05

## 2024-03-05 RX ORDER — SODIUM CHLORIDE 9 MG/ML
40 INJECTION, SOLUTION INTRAVENOUS AS NEEDED
OUTPATIENT
Start: 2024-03-05

## 2024-03-05 RX ORDER — MISOPROSTOL 100 UG/1
800 TABLET ORAL AS NEEDED
OUTPATIENT
Start: 2024-03-05

## 2024-03-05 RX ORDER — KETOROLAC TROMETHAMINE 15 MG/ML
30 INJECTION, SOLUTION INTRAMUSCULAR; INTRAVENOUS ONCE
OUTPATIENT
Start: 2024-03-05 | End: 2024-03-05

## 2024-03-05 RX ORDER — OXYTOCIN/0.9 % SODIUM CHLORIDE 30/500 ML
999 PLASTIC BAG, INJECTION (ML) INTRAVENOUS ONCE
OUTPATIENT
Start: 2024-03-05 | End: 2024-03-05

## 2024-03-05 RX ORDER — LIDOCAINE HYDROCHLORIDE 10 MG/ML
0.5 INJECTION, SOLUTION INFILTRATION; PERINEURAL ONCE AS NEEDED
OUTPATIENT
Start: 2024-03-05

## 2024-03-05 RX ORDER — CARBOPROST TROMETHAMINE 250 UG/ML
250 INJECTION, SOLUTION INTRAMUSCULAR AS NEEDED
OUTPATIENT
Start: 2024-03-05

## 2024-03-05 RX ORDER — SODIUM CHLORIDE, SODIUM LACTATE, POTASSIUM CHLORIDE, CALCIUM CHLORIDE 600; 310; 30; 20 MG/100ML; MG/100ML; MG/100ML; MG/100ML
125 INJECTION, SOLUTION INTRAVENOUS CONTINUOUS
OUTPATIENT
Start: 2024-03-05

## 2024-03-05 RX ORDER — OXYTOCIN/0.9 % SODIUM CHLORIDE 30/500 ML
250 PLASTIC BAG, INJECTION (ML) INTRAVENOUS CONTINUOUS
OUTPATIENT
Start: 2024-03-05 | End: 2024-03-05

## 2024-03-05 RX ORDER — SODIUM CHLORIDE 0.9 % (FLUSH) 0.9 %
10 SYRINGE (ML) INJECTION AS NEEDED
OUTPATIENT
Start: 2024-03-05

## 2024-03-05 RX ORDER — ACETAMINOPHEN 500 MG
1000 TABLET ORAL ONCE
OUTPATIENT
Start: 2024-03-05 | End: 2024-03-05

## 2024-03-05 NOTE — PROGRESS NOTES
OB follow up     Fany Brunson is a 39 y.o.  27w0d being seen today for her obstetrical visit.  Patient reports no complaints. Fetal movement: normal.    Her prenatal care is complicated by (and status):  Chronic HTN, Diabetes perexhistant to pregnancy, Grand multip, prior , obesity, anxiety and UTI/trichomoniasis     Review of Systems  Cramping/contractions : none   Vaginal bleeding: none  Fetal movement good     /76   Wt 118 kg (261 lb)   LMP 2023   BMI 44.78 kg/m²     Prenatal Assessment  Fetal Heart Rate: 145  Fundal Height (cm): 27 cm  Movement: Present  Edema  LLE Edema: Trace  RLE Edema: Trace  Facial Edema: None        Assessment    Diagnoses and all orders for this visit:    1. AMA (advanced maternal age) multigravida 35+, second trimester (Primary)  -     POC Urinalysis Dipstick    2. Pre-existing diabetes mellitus during pregnancy in second trimester  -     Case Request; Standing  -     sodium chloride 0.9 % flush 10 mL  -     sodium chloride 0.9 % flush 10 mL  -     sodium chloride 0.9 % infusion 40 mL  -     lidocaine (XYLOCAINE) 1 % injection 0.5 mL  -     lactated ringers bolus 1,000 mL  -     lactated ringers infusion  -     acetaminophen (TYLENOL) tablet 1,000 mg  -     ceFAZolin (ANCEF) 2,000 mg in sodium chloride 0.9 % 100 mL IVPB  -     oxytocin (PITOCIN) 30 units in 0.9% sodium chloride 500 mL (premix)  -     oxytocin (PITOCIN) 30 units in 0.9% sodium chloride 500 mL (premix)  -     ketorolac (TORADOL) injection 30 mg  -     methylergonovine (METHERGINE) injection 200 mcg  -     carboprost (HEMABATE) injection 250 mcg  -     miSOPROStol (CYTOTEC) tablet 800 mcg  -     Case Request  -     Ambulatory Referral to MFM/Perinatology    3. Pre-existing essential hypertension during pregnancy in second trimester  -     Case Request; Standing  -     sodium chloride 0.9 % flush 10 mL  -     sodium chloride 0.9 % flush 10 mL  -     sodium chloride 0.9 % infusion 40 mL  -      lidocaine (XYLOCAINE) 1 % injection 0.5 mL  -     lactated ringers bolus 1,000 mL  -     lactated ringers infusion  -     acetaminophen (TYLENOL) tablet 1,000 mg  -     ceFAZolin (ANCEF) 2,000 mg in sodium chloride 0.9 % 100 mL IVPB  -     oxytocin (PITOCIN) 30 units in 0.9% sodium chloride 500 mL (premix)  -     oxytocin (PITOCIN) 30 units in 0.9% sodium chloride 500 mL (premix)  -     ketorolac (TORADOL) injection 30 mg  -     methylergonovine (METHERGINE) injection 200 mcg  -     carboprost (HEMABATE) injection 250 mcg  -     miSOPROStol (CYTOTEC) tablet 800 mcg  -     Case Request  -     Ambulatory Referral to [facility]/Perinatology    4. Grand multiparity with current pregnancy in second trimester  -     Case Request; Standing  -     sodium chloride 0.9 % flush 10 mL  -     sodium chloride 0.9 % flush 10 mL  -     sodium chloride 0.9 % infusion 40 mL  -     lidocaine (XYLOCAINE) 1 % injection 0.5 mL  -     lactated ringers bolus 1,000 mL  -     lactated ringers infusion  -     acetaminophen (TYLENOL) tablet 1,000 mg  -     ceFAZolin (ANCEF) 2,000 mg in sodium chloride 0.9 % 100 mL IVPB  -     oxytocin (PITOCIN) 30 units in 0.9% sodium chloride 500 mL (premix)  -     oxytocin (PITOCIN) 30 units in 0.9% sodium chloride 500 mL (premix)  -     ketorolac (TORADOL) injection 30 mg  -     methylergonovine (METHERGINE) injection 200 mcg  -     carboprost (HEMABATE) injection 250 mcg  -     miSOPROStol (CYTOTEC) tablet 800 mcg  -     Case Request  -     Ambulatory Referral to [facility]/Perinatology    5. Previous  delivery, antepartum  -     Case Request; Standing  -     sodium chloride 0.9 % flush 10 mL  -     sodium chloride 0.9 % flush 10 mL  -     sodium chloride 0.9 % infusion 40 mL  -     lidocaine (XYLOCAINE) 1 % injection 0.5 mL  -     lactated ringers bolus 1,000 mL  -     lactated ringers infusion  -     acetaminophen (TYLENOL) tablet 1,000 mg  -     ceFAZolin (ANCEF) 2,000 mg in sodium chloride 0.9 % 100 mL  IVPB  -     oxytocin (PITOCIN) 30 units in 0.9% sodium chloride 500 mL (premix)  -     oxytocin (PITOCIN) 30 units in 0.9% sodium chloride 500 mL (premix)  -     ketorolac (TORADOL) injection 30 mg  -     methylergonovine (METHERGINE) injection 200 mcg  -     carboprost (HEMABATE) injection 250 mcg  -     miSOPROStol (CYTOTEC) tablet 800 mcg  -     Case Request  -     Ambulatory Referral to M/Perinatology    6. Maternal morbid obesity in second trimester, antepartum    7. Recurrent urinary tract infection affecting pregnancy in second trimester    Other orders  -     Admit To Obstetrics Inpatient; Standing  -     Code Status and Medical Interventions:; Standing  -     Obtain Informed Consent; Standing  -     Vital Signs q 4 while awake; Standing  -     Vital Signs Per Hospital Policy; Standing  -     Continuous Fetal Monitoring With NST on Admission and Prior to Initiation of Oxytocin.; Standing  -     External Uterine Contraction Monitoring; Standing  -     Notify Physician (specified); Standing  -     Notify physician for tachysystole (per hospital algorithm); Standing  -     Notify physician if membranes ruptured, bleeding greater than 1 pad an hour, fetal heart tone abnormality, and severe pain; Standing  -     Initiate Group Beta Strep (GBS) Prophylaxis Protocol, If Criteria Met; Standing  -     Insert Indwelling Urinary Catheter; Standing  -     Assess Need for Indwelling Urinary Catheter - Follow Removal Protocol; Standing  -     Urinary Catheter Care; Standing  -     Abdominal Prep with Clippers; Standing  -     Chlorhexadine Skin Prep Unless Otherwise Indicated; Standing  -     SCD (sequential compression devices); Standing  -     POC Glucose Once; Standing  -     Document Gatorade Consumption Prior to Admission (Yes or No); Standing  -     NPO Diet NPO Type: Ice Chips; Standing  -     Inpatient Consult to Anesthesiology; Standing  -     Type & Screen; Standing  -     CBC (No Diff); Standing  -     T  Pallidum Antibody w/ reflex RPR (Syphilis); Standing  -     Insert Peripheral IV; Standing  -     Saline Lock & Maintain IV Access; Standing  -     Notify Physician (specified); Standing  -     Vital Signs Per Hospital Policy; Standing  -     Strict Bed Rest; Standing  -     Fundal & Lochia Check; Standing  -     Fundal & Lochia Check; Standing  -     Diet: Regular/House Diet; Fluid Consistency: Thin (IDDSI 0); Standing        1) pregnancy at 27w0d   Rh+, labs last visit - Not anemic - syphilis negative   Reviewed next trimester in detail including but not limited to finding pediatrician, looking into prenatal classes and hospital tours.   Encouraged TDaP booster and flu shot in pregnancy and why  Encouraged to consider questions regarding L&D, anesthesia, breast feeding and birth control   2) Diabetes   Found early in pregnancy, suspect pre-gestational   Last two weeks BS in range completely   On Lantus 35 AM and 27 PM  Short 22 with meals   HgA1c last visit 5.6% - normal CMP   Now that controlled, does need Fetal echo likely   So referral made to [facility]   3) Chronic HTN   On procardia 60 mg XL BID and labetalol 300 mg BID   On ASA   Has always had elevated proteinuria on screening, but also typically with persistent UTI to complicate picture   Timing of delivery set for 38+ weeks based on this.   But could be earlier as below   4) AMA   Normal NIPT   5) Grand multiparity   5 prior C-Sections, declined sterilization   6) Prior    Again scheduled on 24 currently   Last note does suggest thinned TORO   Question moving to 36 or 37 weeks ?  7) obesity   Pre pregnancy BMI > 35   Suspect MFM to do ultrasound   8) UTI, recurrent   On suppression   Still no negative culture   Can send again today, order in Epic     Plan    Reviewed this stage of pregnancy  Problem list updated   Follow up in 2 weeks    Salvador Carson MD   3/5/2024  11:30 EST

## 2024-03-05 NOTE — Clinical Note
Viky, Should see MFM to check in on timing of delivery and set up for fetal echo. Thanks, Dr. Carson

## 2024-03-07 ENCOUNTER — TELEPHONE (OUTPATIENT)
Dept: OBSTETRICS AND GYNECOLOGY | Facility: CLINIC | Age: 39
End: 2024-03-07
Payer: MEDICARE

## 2024-03-07 NOTE — TELEPHONE ENCOUNTER
Call placed to Fany to review M diabetes management. Pt reports she is currently checking her blood sugars 4 times a day: fasting and 2 hours after eating. Discussed with pt MFM management and checking blood sugars 7 times a day: before meals, one hour after meals and before bedtime snack. Pt agreeable to this adjustment. Discussed carb goals and eating every 2-3 hours with 30g carbs for breakfast, 45g carbs for lunch and dinner and 10-15g carbs with snacks in between meals and at bedtime. Pt verbalized understanding. Glucose goals of 60-90 before meals and  one hour after meals were reviewed. Discussed with patient that if glucose values are consistently outside of the targeted range, MFM will discuss adjusting her current insulin regimen. Pt notes she is currently taking Lantus:  and Humalo. Encouraged pt to start checking blood sugars 7 times a day and bring glucose logs to her upcoming appointment. Fany verbalized understanding. MFM will review blood sugar logs prior to appointment should pt need to send them in sooner for consistently elevated blood sugars. Pt in agreement with plan.   [employer] message sent to patient with MFM glucose log attached. Will provide patient with MFM folder with diabetes packet, hypoglycemia protocol and extra glucose logs at time of appointment. All questions answered at time of call and encouraged patient to call this RN with any additional questions prior to her appointment. [facility] looks forward to seeing pt on 3/15 at 9:30.

## 2024-03-14 ENCOUNTER — TRANSCRIBE ORDERS (OUTPATIENT)
Dept: ULTRASOUND IMAGING | Facility: HOSPITAL | Age: 39
End: 2024-03-14
Payer: MEDICARE

## 2024-03-14 DIAGNOSIS — O10.913 CHRONIC HYPERTENSION WITH EXACERBATION DURING PREGNANCY IN THIRD TRIMESTER: Primary | ICD-10-CM

## 2024-03-14 DIAGNOSIS — O24.312 PRE-EXISTING DIABETES MELLITUS DURING PREGNANCY IN SECOND TRIMESTER: ICD-10-CM

## 2024-03-14 DIAGNOSIS — O34.219 PREVIOUS CESAREAN DELIVERY, ANTEPARTUM: ICD-10-CM

## 2024-03-14 NOTE — PROGRESS NOTES
MATERNAL FETAL MEDICINE Consult Note    Dear Dr Salvador Carson MD:    Thank you for your kind referral of Fany Brunson.  As you know, she is a 39 y.o. G 7 P 6 at 28  3/7 weeks gestation (Estimated Date of Delivery: 24). This is a consult.    Her antepartum course is complicated by:  AMA  CHTN  T2DM  H/O  x 6    Aneuploidy Screening: MaterniT 21 - Negative    HPI: Today, she denies headache, blurry vision, RUQ pain. No vaginal bleeding, no contractions.     Review of History:  Past Medical History:   Diagnosis Date    Anxiety     Gestational diabetes     Herpes     HSV #1 - cold sores    Hypertension     chronic    Oligohydramnios in third trimester 10/15/2015    Formatting of this note might be different from the original.   2015 IMO UPDATE      Varicella      Past Surgical History:   Procedure Laterality Date    APPENDECTOMY      BREAST BIOPSY  10/2022     SECTION       SECTION Bilateral 2019    Procedure:  SECTION REPEAT;  Surgeon: Salvador Carson MD;  Location: [facility] LABOR DELIVERY;  Service: Obstetrics/Gynecology    CHOLECYSTECTOMY      CHOLECYSTECTOMY      LAPAROSCOPIC CHOLECYSTECTOMY      WRIST SURGERY       Social History     Socioeconomic History    Marital status:    Tobacco Use    Smoking status: Former     Types: Cigarettes    Smokeless tobacco: Never   Vaping Use    Vaping status: Never Used   Substance and Sexual Activity    Alcohol use: No    Drug use: No    Sexual activity: Yes     Partners: Male     Family History   Problem Relation Age of Onset    Stroke Father     Breast cancer Paternal Grandmother         Passed at age 39 from breast cancer    Ovarian cancer Neg Hx     Uterine cancer Neg Hx     Colon cancer Neg Hx     Deep vein thrombosis Neg Hx     Pulmonary embolism Neg Hx       Allergies   Allergen Reactions    Latex Rash      Current Outpatient Medications on File Prior to Visit   Medication Sig Dispense Refill     aspirin 81 MG chewable tablet Chew 1 tablet Daily. 30 tablet 7    Blood Glucose Monitoring Suppl (OneTouch Verio Flex System) w/Device kit       Glucose Blood (Blood Glucose Test) strip 1 each by In Vitro route 4 (Four) Times a Day. Test fasting and 2 hours after meals 120 each 3    glucose monitor monitoring kit Use 1 each 4 (Four) Times a Day. 1 each 0    Insulin Glargine (LANTUS SOLOSTAR) 100 UNIT/ML injection pen Inject 35 Units under the skin into the appropriate area as directed Daily With Breakfast AND 27 Units every night at bedtime. I 100 mL 3    Insulin Lispro, 1 Unit Dial, (HUMALOG) 100 UNIT/ML solution pen-injector Inject 22 Units under the skin into the appropriate area as directed 3 (Three) Times a Day Before Meals. 15 mL 11    Insulin Pen Needle (Droplet Pen Needles) 32G X 4 MM misc USE 1 PEN NEEDLE FOUR TIMES A  each 1    labetalol (NORMODYNE) 300 MG tablet Take 1 tablet by mouth 2 (Two) Times a Day. 60 tablet 5    NIFEdipine CC (ADALAT CC) 60 MG 24 hr tablet Take 1 tablet by mouth 2 (Two) Times a Day. (Patient taking differently: Take 90 mg by mouth 2 (Two) Times a Day. 90mg BID) 90 tablet 2    nitrofurantoin, macrocrystal-monohydrate, (Macrobid) 100 MG capsule Take 1 capsule by mouth Daily. 30 capsule 5    Prenatal MV-Min-Fe Fum-FA-DHA (Prenatal Multivitamin + DHA) 28-0.8 & 200 MG misc       glucose (B-D GLUCOSE) 5 g chewable tablet Chew 3 tablets As Needed for Low Blood Sugar. (Patient not taking: Reported on 3/15/2024) 50 tablet 6    hydrOXYzine pamoate (VISTARIL) 25 MG capsule Take 1 capsule by mouth 4 (Four) Times a Day As Needed for Anxiety. (Patient not taking: Reported on 3/15/2024) 120 capsule 4    Lancets misc Use 1 each 4 (Four) Times a Day. Use to test fasting and 2 hours after meals 120 each 3     No current facility-administered medications on file prior to visit.        Past obstetric, gynecological, medical, surgical, family and social history reviewed.  Relevant lab work and  "imaging reviewed.    Review of systems  Constitutional:  denies fever, chills, malaise.   ENT/Mouth:  denies sore throat, tinnitus  Eyes: denies vision changes/pain  CV:  denies chest pain  Respiratory:  denies cough/SOB  GI:  denies N/V, diarrhea, abdominal pain.    :   denies dysuria  Skin:  denies lesions or pruritus   Neuro:  denies weakness, focal neurologic symptoms    Vitals:    03/15/24 0933 03/15/24 0936 03/15/24 0939   BP: (!) 173/101 176/100 168/100   BP Location: Right arm Left arm Left arm   Patient Position: Sitting Sitting Sitting   Pulse: 87 85    Temp: 98.2 °F (36.8 °C)     TempSrc: Temporal     Weight: 120 kg (264 lb 12.8 oz)     Height: 162.6 cm (64\")         PHYSICAL EXAM   GENERAL: Not in acute distress, AAOx3, pleasant  CARDIO: regular rate and rhythm  PULM: symmetric chest rise, speaking in complete sentences without difficulty  NEURO: awake, alert and oriented to person, place, and time  ABDOMINAL: No fundal tenderness, no rebound or guarding, gravid  EXTREMITIES: no bilateral lower extremity edema/tenderness  SKIN: Warm, well-perfused      ULTRASOUND   Please view full ultrasound note on Imaging tab in ViewPoint.  Transverse presentation  Right lateral placenta  SHANTANU 18.6 cm which is normal  EFW 1487 g (75%, AC 98%)  Transabdominal cervical length is 4.73 cm, which is normal  BPP 8/8  Anatomy appears WNL, but sup-optimal aortic arch    ASSESSMENT/COUNSELIN y.o. G 7 P 6 at 28  3/7 weeks gestation (Estimated Date of Delivery: 24)    -Pregnancy  [ X ] stable  [   ] improving [  ] worsening    Diagnoses and all orders for this visit:    1. Advanced maternal age in multigravida, third trimester (Primary)    2. Pre-existing diabetes mellitus during pregnancy in third trimester  -     ECG 12 Lead; Future  -     US [facility]; Future    3. Pre-existing secondary hypertension during pregnancy in third trimester    4. Grand multiparity with current pregnancy in third " trimester    5. Previous  delivery, antepartum  Overview:  Added automatically from request for surgery [id number]  Formatting of this note might be different from the original.   Formatting of this note might be different from the original.   Added automatically from request for surgery [id number]        6. Maternal morbid obesity in second trimester, antepartum       Pregestational DM, Type 2  Counseling: I explained to her that patients with pregestational diabetes have a variety of pregnancy related risks that are related to their level of glycemic control.  She reports she has not had a recent optho appointment.  She has not had an EKG.      We discussed at length the increased risks related to diabetes including but not limited to congenital anomalies, fetal growth disorders (FGR or LGA), indicated or spontaneous  birth, DKA, macrosomia, polyhydramnios, birth trauma,  complications (jaundice, hypoglycemia, NICU admit) and stillbirth with uncontrolled glucose.       We reviewed the results of her glycemic profiling, which reveals ok glycemic control with current insulin regimen.  She does not report issues with low blood sugar. She has only been recording 3 values daily so I have asked her to check her blood sugar and record 7x/ day. She will submit on Tuesday and we will re-evaluate.     We discussed diet recommendations, and goal pre-prandial values below 90 and 1-hour post-prandial values below 120 to optimize her pregnancy outcome as many studies have demonstrated that this is the normal range for pregnant women without diabetes.     We also discussed preeclampsia risk reduction with baby aspirin, which she is taking daily.      CHTN  On labetalol 300 mg BID  We discussed the risks of chronic hypertension including intrauterine growth restriction, increased risk of preeclampsia, increased risk of premature delivery, and increased risk for placental abruption.  I reassured her that with mild  hypertension, no underlying cardiac disease, and normal renal function, most women do well in pregnancy.    I explained that acceptable blood pressures in pregnancies with chronic hypertension and without renal damage are 120-140/70-90s. Newer data from the [facility] (2022, CHAP TRIAL), supports more aggressive bp treatment to below 140/90 (previously ,160 in CHTN) and that this does not impair fetal growth or well-being but reduces risks of pre-eclampsia,  birth, and other pregnancy morbidity.  I discussed this with the patient.      I recommended serial growth ultrasounds every 4 weeks  to ensure appropriate fetal growth. In addition,  fetal testing 1-2x weekly should be initiated around 32 weeks gestation.  I would recommend a 38 week delivery given CHTN that has previously required medication.  She may require closer to 37 depending on her control closer to delivery.      Her blood pressures in the office today are 160-170s/100s -- will send to [facility] for pre-eclampsia labs and eval. She denies headache/vision changes/ ruq pain etc. Reports she took her blood pressure medications this morning at 815 like normal.     Advanced Maternal Age  The patient's age related risk for aneuploidy was reviewed. Noninvasive prenatal screening with cell-free fetal DNA (NIPT) results reviewed, which were normal.    Advanced maternal age has been associated with placental insufficiency with increased risk of fetal growth disorder and hypertensive disorders. Recommend fetal growth surveillance. Start  fetal surveillance with weekly BPP at 32-36 weeks.  Recommend baby ASA, which she is taking.          RECOMMENDATIONS  -ASA 81 mg daily  - Recommend emailing/fax/submitting her log weekly for review; to bring log to all [facility] office visits.  -Continue ADA diet with carbohydrate counting.  Has seen diabetic counselors  -Continue glucose monitoring 7 times daily as instructed.  - Fetal echo @ 22-24 weeks  -Serial  growth ultrasounds every 4 weeks   -Starting at 32 weeks: Weekly fetal  surveillance until delivery   -Starting at 28 weeks: Fetal movement instructions given continue daily until delivery; instructed to report to labor and delivery if cannot achieve more than 10 kicks in one hour or if she perceives a decrease in fetal movement  -If estimated fetal weight >4500gm at time of delivery, would recommend  delivery counseling   -Continue routine prenatal care with primary ob team   -Recommend patient to see ophthalmology  if not seen in last year   -At present, goal for delivery is 37 - 38 weeks       Follow-up: 4 weeks for growth / bpp, pt also needs fetal echo.     Thank you for the consult and opportunity to care for this patient.  Please feel free to reach out with any questions or concerns.      I spent 45 minutes caring for this patient on this date of service. This time includes time spent by me in the following activities: preparing for the visit, reviewing tests, obtaining and/or reviewing a separately obtained history, performing a medically appropriate examination and/or evaluation, counseling and educating the patient/family/caregiver and independently interpreting results and communicating that information with the patient/family/caregiver with greater than 50% spent in counseling and coordination of care.     DEAN Sweet  Maternal Fetal Medicine-[facility]  Office: [phone]  [email or web address]

## 2024-03-15 ENCOUNTER — OFFICE VISIT (OUTPATIENT)
Dept: OBSTETRICS AND GYNECOLOGY | Facility: CLINIC | Age: 39
End: 2024-03-15
Payer: MEDICARE

## 2024-03-15 ENCOUNTER — HOSPITAL ENCOUNTER (OUTPATIENT)
Facility: HOSPITAL | Age: 39
Discharge: HOME | End: 2024-03-15
Attending: OBSTETRICS & GYNECOLOGY | Admitting: OBSTETRICS & GYNECOLOGY
Payer: MEDICARE

## 2024-03-15 ENCOUNTER — HOSPITAL ENCOUNTER (OUTPATIENT)
Dept: ULTRASOUND IMAGING | Facility: HOSPITAL | Age: 39
Discharge: HOME | End: 2024-03-15
Payer: MEDICARE

## 2024-03-15 VITALS
DIASTOLIC BLOOD PRESSURE: 100 MMHG | HEIGHT: 64 IN | TEMPERATURE: 98.2 F | BODY MASS INDEX: 45.21 KG/M2 | SYSTOLIC BLOOD PRESSURE: 168 MMHG | HEART RATE: 85 BPM | WEIGHT: 264.8 LBS

## 2024-03-15 VITALS
DIASTOLIC BLOOD PRESSURE: 81 MMHG | SYSTOLIC BLOOD PRESSURE: 153 MMHG | BODY MASS INDEX: 44.9 KG/M2 | HEIGHT: 64 IN | WEIGHT: 263 LBS | TEMPERATURE: 98.5 F | HEART RATE: 83 BPM | RESPIRATION RATE: 18 BRPM

## 2024-03-15 DIAGNOSIS — O34.219 PREVIOUS CESAREAN DELIVERY, ANTEPARTUM: ICD-10-CM

## 2024-03-15 DIAGNOSIS — O09.43 GRAND MULTIPARITY WITH CURRENT PREGNANCY IN THIRD TRIMESTER: ICD-10-CM

## 2024-03-15 DIAGNOSIS — E66.01 MATERNAL MORBID OBESITY IN SECOND TRIMESTER, ANTEPARTUM: ICD-10-CM

## 2024-03-15 DIAGNOSIS — O24.312 PRE-EXISTING DIABETES MELLITUS DURING PREGNANCY IN SECOND TRIMESTER: ICD-10-CM

## 2024-03-15 DIAGNOSIS — O10.913 CHRONIC HYPERTENSION WITH EXACERBATION DURING PREGNANCY IN THIRD TRIMESTER: ICD-10-CM

## 2024-03-15 DIAGNOSIS — O10.413 PRE-EXISTING SECONDARY HYPERTENSION DURING PREGNANCY IN THIRD TRIMESTER: ICD-10-CM

## 2024-03-15 DIAGNOSIS — O24.313 PRE-EXISTING DIABETES MELLITUS DURING PREGNANCY IN THIRD TRIMESTER: ICD-10-CM

## 2024-03-15 DIAGNOSIS — O99.212 MATERNAL MORBID OBESITY IN SECOND TRIMESTER, ANTEPARTUM: ICD-10-CM

## 2024-03-15 DIAGNOSIS — O09.523 ADVANCED MATERNAL AGE IN MULTIGRAVIDA, THIRD TRIMESTER: Primary | ICD-10-CM

## 2024-03-15 PROBLEM — O16.9 HYPERTENSION AFFECTING PREGNANCY: Status: ACTIVE | Noted: 2024-03-15

## 2024-03-15 PROBLEM — I10 HYPERTENSION: Status: ACTIVE | Noted: 2024-03-15

## 2024-03-15 LAB
ALBUMIN SERPL-MCNC: 3.4 G/DL (ref 3.5–5.2)
ALBUMIN/GLOB SERPL: 1.1 G/DL
ALP SERPL-CCNC: 116 U/L (ref 39–117)
ALT SERPL W P-5'-P-CCNC: 11 U/L (ref 1–33)
ANION GAP SERPL CALCULATED.3IONS-SCNC: 12 MMOL/L (ref 5–15)
AST SERPL-CCNC: 10 U/L (ref 1–32)
BASOPHILS # BLD AUTO: 0.05 10*3/MM3 (ref 0–0.2)
BASOPHILS NFR BLD AUTO: 0.4 % (ref 0–1.5)
BILIRUB SERPL-MCNC: 0.2 MG/DL (ref 0–1.2)
BUN SERPL-MCNC: 15 MG/DL (ref 6–20)
BUN/CREAT SERPL: 22.4 (ref 7–25)
CALCIUM SPEC-SCNC: 9.1 MG/DL (ref 8.6–10.5)
CHLORIDE SERPL-SCNC: 108 MMOL/L (ref 98–107)
CO2 SERPL-SCNC: 18 MMOL/L (ref 22–29)
CREAT SERPL-MCNC: 0.67 MG/DL (ref 0.57–1)
CREAT UR-MCNC: 49.8 MG/DL
DEPRECATED RDW RBC AUTO: 44.5 FL (ref 37–54)
EGFRCR SERPLBLD CKD-EPI 2021: 114.2 ML/MIN/1.73
EOSINOPHIL # BLD AUTO: 0.28 10*3/MM3 (ref 0–0.4)
EOSINOPHIL NFR BLD AUTO: 2.4 % (ref 0.3–6.2)
ERYTHROCYTE [DISTWIDTH] IN BLOOD BY AUTOMATED COUNT: 14.3 % (ref 12.3–15.4)
GLOBULIN UR ELPH-MCNC: 3 GM/DL
GLUCOSE BLDC GLUCOMTR-MCNC: 114 MG/DL (ref 70–130)
GLUCOSE SERPL-MCNC: 104 MG/DL (ref 65–99)
HCT VFR BLD AUTO: 37.2 % (ref 34–46.6)
HGB BLD-MCNC: 12.4 G/DL (ref 12–15.9)
IMM GRANULOCYTES # BLD AUTO: 0.06 10*3/MM3 (ref 0–0.05)
IMM GRANULOCYTES NFR BLD AUTO: 0.5 % (ref 0–0.5)
LYMPHOCYTES # BLD AUTO: 2.73 10*3/MM3 (ref 0.7–3.1)
LYMPHOCYTES NFR BLD AUTO: 23.2 % (ref 19.6–45.3)
MCH RBC QN AUTO: 28.6 PG (ref 26.6–33)
MCHC RBC AUTO-ENTMCNC: 33.3 G/DL (ref 31.5–35.7)
MCV RBC AUTO: 85.7 FL (ref 79–97)
MONOCYTES # BLD AUTO: 0.51 10*3/MM3 (ref 0.1–0.9)
MONOCYTES NFR BLD AUTO: 4.3 % (ref 5–12)
NEUTROPHILS NFR BLD AUTO: 69.2 % (ref 42.7–76)
NEUTROPHILS NFR BLD AUTO: 8.13 10*3/MM3 (ref 1.7–7)
NRBC BLD AUTO-RTO: 0 /100 WBC (ref 0–0.2)
PLATELET # BLD AUTO: 251 10*3/MM3 (ref 140–450)
PMV BLD AUTO: 10.7 FL (ref 6–12)
POTASSIUM SERPL-SCNC: 3.5 MMOL/L (ref 3.5–5.2)
PROT ?TM UR-MCNC: 13.5 MG/DL
PROT SERPL-MCNC: 6.4 G/DL (ref 6–8.5)
PROT/CREAT UR: 271.1 MG/G CREA (ref 0–200)
RBC # BLD AUTO: 4.34 10*6/MM3 (ref 3.77–5.28)
SODIUM SERPL-SCNC: 138 MMOL/L (ref 136–145)
WBC NRBC COR # BLD AUTO: 11.76 10*3/MM3 (ref 3.4–10.8)

## 2024-03-15 PROCEDURE — 59025 FETAL NON-STRESS TEST: CPT

## 2024-03-15 PROCEDURE — G0378 HOSPITAL OBSERVATION PER HR: HCPCS

## 2024-03-15 PROCEDURE — 82948 REAGENT STRIP/BLOOD GLUCOSE: CPT

## 2024-03-15 PROCEDURE — 84156 ASSAY OF PROTEIN URINE: CPT | Performed by: OBSTETRICS & GYNECOLOGY

## 2024-03-15 PROCEDURE — 82570 ASSAY OF URINE CREATININE: CPT | Performed by: OBSTETRICS & GYNECOLOGY

## 2024-03-15 PROCEDURE — 85025 COMPLETE CBC W/AUTO DIFF WBC: CPT | Performed by: OBSTETRICS & GYNECOLOGY

## 2024-03-15 PROCEDURE — 59025 FETAL NON-STRESS TEST: CPT | Performed by: OBSTETRICS & GYNECOLOGY

## 2024-03-15 PROCEDURE — 80053 COMPREHEN METABOLIC PANEL: CPT | Performed by: OBSTETRICS & GYNECOLOGY

## 2024-03-15 NOTE — LETTER
March 15, 2024       To Whom It May Concern:    Dionicio accompanied his wife to labor and delivery today.           Sincerely,      Dr. Moore and Labor and Delivery Staff

## 2024-03-15 NOTE — PROGRESS NOTES
Pt reports that she is doing well and denies vaginal bleeding, cramping, contractions or LOF at this time. Reports active fetal movement. Reviewed when to call OB office or present to L&D for evaluation with symptoms such as decreased fetal movement, vaginal bleeding, LOF or ctxs. Pt verbalized understanding. Denies HA, visual changes or epigastric pain. Denies any additional complaints at time of appointment. Next OB appointment scheduled for 3/19.    Blood sugar at time of appointment is 114. Blood sugar log on chart. Pt instructed to start checking 7x daily. Diabetic education folder provided to pt at time of appointment.     Vitals:    03/15/24 0939   BP: 168/100   Pulse:    Temp:

## 2024-03-15 NOTE — NON STRESS TEST
Fany Brunson, a  at 28w3d with an SINDI of 2024, by Last Menstrual Period, was seen at [facility] LABOR DELIVERY for a nonstress test.    Chief Complaint   Patient presents with    Hypertension     Outpatient: Patient sent form MFM for severe range BP's in office. Pt has +FM, -LF, -VB. No HA, visual disturbance or epigastric pain.          Patient Active Problem List   Diagnosis    High-risk pregnancy in third trimester    Gestational diabetes mellitus, class A2    Chronic hypertension with exacerbation during pregnancy in third trimester    H/O:  section    Pregnancy    S/P repeat low transverse     Pre-existing diabetes mellitus during pregnancy in second trimester    Pre-existing essential hypertension during pregnancy in second trimester    Grand multiparity with current pregnancy in second trimester    Diabetes mellitus    Hypertension    Hypertension affecting pregnancy       Start Time: 1204    Stop Time: 1348      Interpretation A  Nonstress Test Interpretation A: Reactive

## 2024-03-15 NOTE — LETTER
March 15, 2024     Salvador Carson MD  [address]  [address]  [place] [id number]    Patient: Fany Brunson   YOB: 1985   Date of Visit: 3/15/2024       Dear Salvador Carson MD,    Thank you for referring Fany Brunson to me for evaluation. Below is a copy of my consult note.    If you have questions, please do not hesitate to call me. I look forward to following Fany along with you.         Sincerely,        DEAN Dill        CC: No Recipients                          MATERNAL FETAL MEDICINE Consult Note    Dear Dr Salvador Carson MD:    Thank you for your kind referral of Fany Brunson.  As you know, she is a 39 y.o. G 7 P 6 at 28  3/7 weeks gestation (Estimated Date of Delivery: 24). This is a consult.    Her antepartum course is complicated by:  AMA  CHTN  T2DM  H/O  x 6    Aneuploidy Screening: MaterniT 21 - Negative    HPI: Today, she denies headache, blurry vision, RUQ pain. No vaginal bleeding, no contractions.     Review of History:  Past Medical History:   Diagnosis Date   • Anxiety    • Gestational diabetes    • Herpes     HSV #1 - cold sores   • Hypertension     chronic   • Oligohydramnios in third trimester 10/15/2015    Formatting of this note might be different from the original.   2015 IMO UPDATE     • Varicella      Past Surgical History:   Procedure Laterality Date   • APPENDECTOMY     • BREAST BIOPSY  10/2022   •  SECTION     •  SECTION Bilateral 2019    Procedure:  SECTION REPEAT;  Surgeon: Salvador Carson MD;  Location: [facility] LABOR DELIVERY;  Service: Obstetrics/Gynecology   • CHOLECYSTECTOMY     • CHOLECYSTECTOMY     • LAPAROSCOPIC CHOLECYSTECTOMY     • WRIST SURGERY       Social History     Socioeconomic History   • Marital status:    Tobacco Use   • Smoking status: Former     Types: Cigarettes   • Smokeless tobacco: Never   Vaping Use   • Vaping status: Never Used    Substance and Sexual Activity   • Alcohol use: No   • Drug use: No   • Sexual activity: Yes     Partners: Male     Family History   Problem Relation Age of Onset   • Stroke Father    • Breast cancer Paternal Grandmother         Passed at age 39 from breast cancer   • Ovarian cancer Neg Hx    • Uterine cancer Neg Hx    • Colon cancer Neg Hx    • Deep vein thrombosis Neg Hx    • Pulmonary embolism Neg Hx       Allergies   Allergen Reactions   • Latex Rash      Current Outpatient Medications on File Prior to Visit   Medication Sig Dispense Refill   • aspirin 81 MG chewable tablet Chew 1 tablet Daily. 30 tablet 7   • Blood Glucose Monitoring Suppl (OneTouch Verio Flex System) w/Device kit      • Glucose Blood (Blood Glucose Test) strip 1 each by In Vitro route 4 (Four) Times a Day. Test fasting and 2 hours after meals 120 each 3   • glucose monitor monitoring kit Use 1 each 4 (Four) Times a Day. 1 each 0   • Insulin Glargine (LANTUS SOLOSTAR) 100 UNIT/ML injection pen Inject 35 Units under the skin into the appropriate area as directed Daily With Breakfast AND 27 Units every night at bedtime. I 100 mL 3   • Insulin Lispro, 1 Unit Dial, (HUMALOG) 100 UNIT/ML solution pen-injector Inject 22 Units under the skin into the appropriate area as directed 3 (Three) Times a Day Before Meals. 15 mL 11   • Insulin Pen Needle (Droplet Pen Needles) 32G X 4 MM misc USE 1 PEN NEEDLE FOUR TIMES A  each 1   • labetalol (NORMODYNE) 300 MG tablet Take 1 tablet by mouth 2 (Two) Times a Day. 60 tablet 5   • NIFEdipine CC (ADALAT CC) 60 MG 24 hr tablet Take 1 tablet by mouth 2 (Two) Times a Day. (Patient taking differently: Take 90 mg by mouth 2 (Two) Times a Day. 90mg BID) 90 tablet 2   • nitrofurantoin, macrocrystal-monohydrate, (Macrobid) 100 MG capsule Take 1 capsule by mouth Daily. 30 capsule 5   • Prenatal MV-Min-Fe Fum-FA-DHA (Prenatal Multivitamin + DHA) 28-0.8 & 200 MG misc      • glucose (B-D GLUCOSE) 5 g chewable tablet  "Chew 3 tablets As Needed for Low Blood Sugar. (Patient not taking: Reported on 3/15/2024) 50 tablet 6   • hydrOXYzine pamoate (VISTARIL) 25 MG capsule Take 1 capsule by mouth 4 (Four) Times a Day As Needed for Anxiety. (Patient not taking: Reported on 3/15/2024) 120 capsule 4   • Lancets misc Use 1 each 4 (Four) Times a Day. Use to test fasting and 2 hours after meals 120 each 3     No current facility-administered medications on file prior to visit.        Past obstetric, gynecological, medical, surgical, family and social history reviewed.  Relevant lab work and imaging reviewed.    Review of systems  Constitutional:  denies fever, chills, malaise.   ENT/Mouth:  denies sore throat, tinnitus  Eyes: denies vision changes/pain  CV:  denies chest pain  Respiratory:  denies cough/SOB  GI:  denies N/V, diarrhea, abdominal pain.    :   denies dysuria  Skin:  denies lesions or pruritus   Neuro:  denies weakness, focal neurologic symptoms    Vitals:    03/15/24 0933 03/15/24 0936 03/15/24 0939   BP: (!) 173/101 176/100 168/100   BP Location: Right arm Left arm Left arm   Patient Position: Sitting Sitting Sitting   Pulse: 87 85    Temp: 98.2 °F (36.8 °C)     TempSrc: Temporal     Weight: 120 kg (264 lb 12.8 oz)     Height: 162.6 cm (64\")         PHYSICAL EXAM   GENERAL: Not in acute distress, AAOx3, pleasant  CARDIO: regular rate and rhythm  PULM: symmetric chest rise, speaking in complete sentences without difficulty  NEURO: awake, alert and oriented to person, place, and time  ABDOMINAL: No fundal tenderness, no rebound or guarding, gravid  EXTREMITIES: no bilateral lower extremity edema/tenderness  SKIN: Warm, well-perfused      ULTRASOUND   Please view full ultrasound note on Imaging tab in ViewPoint.  Transverse presentation  Right lateral placenta  SHANTANU 18.6 cm which is normal  EFW 1487 g (75%, AC 98%)  Transabdominal cervical length is 4.73 cm, which is normal  BPP 8/8  Anatomy appears WNL, but sup-optimal aortic " arch    ASSESSMENT/COUNSELIN y.o. G 7 P 6 at 28  3/7 weeks gestation (Estimated Date of Delivery: 24)    -Pregnancy  [ X ] stable  [   ] improving [  ] worsening    Diagnoses and all orders for this visit:    1. Advanced maternal age in multigravida, third trimester (Primary)    2. Pre-existing diabetes mellitus during pregnancy in third trimester  -     ECG 12 Lead; Future  -     [facility]; Future    3. Pre-existing secondary hypertension during pregnancy in third trimester    4. Grand multiparity with current pregnancy in third trimester    5. Previous  delivery, antepartum  Overview:  Added automatically from request for surgery [id number]  Formatting of this note might be different from the original.   Formatting of this note might be different from the original.   Added automatically from request for surgery [id number]        6. Maternal morbid obesity in second trimester, antepartum       Pregestational DM, Type 2  Counseling: I explained to her that patients with pregestational diabetes have a variety of pregnancy related risks that are related to their level of glycemic control.  She reports she has not had a recent optho appointment.  She has not had an EKG.      We discussed at length the increased risks related to diabetes including but not limited to congenital anomalies, fetal growth disorders (FGR or LGA), indicated or spontaneous  birth, DKA, macrosomia, polyhydramnios, birth trauma,  complications (jaundice, hypoglycemia, NICU admit) and stillbirth with uncontrolled glucose.       We reviewed the results of her glycemic profiling, which reveals ok glycemic control with current insulin regimen.  She does not report issues with low blood sugar. She has only been recording 3 values daily so I have asked her to check her blood sugar and record 7x/ day. She will submit on Tuesday and we will re-evaluate.     We discussed diet recommendations, and goal  pre-prandial values below 90 and 1-hour post-prandial values below 120 to optimize her pregnancy outcome as many studies have demonstrated that this is the normal range for pregnant women without diabetes.     We also discussed preeclampsia risk reduction with baby aspirin, which she is taking daily.      CELESTINOTN  On labetalol 300 mg BID  We discussed the risks of chronic hypertension including intrauterine growth restriction, increased risk of preeclampsia, increased risk of premature delivery, and increased risk for placental abruption.  I reassured her that with mild hypertension, no underlying cardiac disease, and normal renal function, most women do well in pregnancy.    I explained that acceptable blood pressures in pregnancies with chronic hypertension and without renal damage are 120-140/70-90s. Newer data from the [facility] (2022, CHAP TRIAL), supports more aggressive bp treatment to below 140/90 (previously ,160 in CHTN) and that this does not impair fetal growth or well-being but reduces risks of pre-eclampsia,  birth, and other pregnancy morbidity.  I discussed this with the patient.      I recommended serial growth ultrasounds every 4 weeks  to ensure appropriate fetal growth. In addition,  fetal testing 1-2x weekly should be initiated around 32 weeks gestation.  I would recommend a 38 week delivery given CHTN that has previously required medication.  She may require closer to 37 depending on her control closer to delivery.      Her blood pressures in the office today are 160-170s/100s -- will send to [facility] for pre-eclampsia labs and eval. She denies headache/vision changes/ ruq pain etc. Reports she took her blood pressure medications this morning at 815 like normal.     Advanced Maternal Age  The patient's age related risk for aneuploidy was reviewed. Noninvasive prenatal screening with cell-free fetal DNA (NIPT) results reviewed, which were normal.    Advanced maternal age has been  associated with placental insufficiency with increased risk of fetal growth disorder and hypertensive disorders. Recommend fetal growth surveillance. Start  fetal surveillance with weekly BPP at 32-36 weeks.  Recommend baby ASA, which she is taking.          RECOMMENDATIONS  -ASA 81 mg daily  - Recommend emailing/fax/submitting her log weekly for review; to bring log to all [facility] office visits.  -Continue ADA diet with carbohydrate counting.  Has seen diabetic counselors  -Continue glucose monitoring 7 times daily as instructed.  - Fetal echo @ 22-24 weeks  -Serial growth ultrasounds every 4 weeks   -Starting at 32 weeks: Weekly fetal  surveillance until delivery   -Starting at 28 weeks: Fetal movement instructions given continue daily until delivery; instructed to report to labor and delivery if cannot achieve more than 10 kicks in one hour or if she perceives a decrease in fetal movement  -If estimated fetal weight >4500gm at time of delivery, would recommend  delivery counseling   -Continue routine prenatal care with primary ob team   -Recommend patient to see ophthalmology  if not seen in last year   -At present, goal for delivery is 37 - 38 weeks       Follow-up: 4 weeks for growth / bpp, pt also needs fetal echo.     Thank you for the consult and opportunity to care for this patient.  Please feel free to reach out with any questions or concerns.      I spent 45 minutes caring for this patient on this date of service. This time includes time spent by me in the following activities: preparing for the visit, reviewing tests, obtaining and/or reviewing a separately obtained history, performing a medically appropriate examination and/or evaluation, counseling and educating the patient/family/caregiver and independently interpreting results and communicating that information with the patient/family/caregiver with greater than 50% spent in counseling and coordination of care.     Constance  DEAN Zhao  Maternal Fetal Medicine-[facility]  Office: [phone]  [email or web address]    Pt reports that she is doing well and denies vaginal bleeding, cramping, contractions or LOF at this time. Reports active fetal movement. Reviewed when to call OB office or present to L&D for evaluation with symptoms such as decreased fetal movement, vaginal bleeding, LOF or ctxs. Pt verbalized understanding. Denies HA, visual changes or epigastric pain. Denies any additional complaints at time of appointment. Next OB appointment scheduled for 3/19.    Blood sugar at time of appointment is 114. Blood sugar log on chart. Pt instructed to start checking 7x daily. Diabetic education folder provided to pt at time of appointment.     Vitals:    03/15/24 0939   BP: 168/100   Pulse:    Temp:

## 2024-03-19 ENCOUNTER — ROUTINE PRENATAL (OUTPATIENT)
Dept: OBSTETRICS AND GYNECOLOGY | Facility: CLINIC | Age: 39
End: 2024-03-19
Payer: MEDICARE

## 2024-03-19 VITALS — BODY MASS INDEX: 44.46 KG/M2 | SYSTOLIC BLOOD PRESSURE: 151 MMHG | WEIGHT: 259 LBS | DIASTOLIC BLOOD PRESSURE: 89 MMHG

## 2024-03-19 DIAGNOSIS — O09.523 ADVANCED MATERNAL AGE IN MULTIGRAVIDA, THIRD TRIMESTER: Primary | ICD-10-CM

## 2024-03-19 DIAGNOSIS — E66.01 MATERNAL MORBID OBESITY IN THIRD TRIMESTER, ANTEPARTUM: ICD-10-CM

## 2024-03-19 DIAGNOSIS — O34.219 PREVIOUS CESAREAN DELIVERY, ANTEPARTUM: ICD-10-CM

## 2024-03-19 DIAGNOSIS — O09.43 GRAND MULTIPARITY WITH CURRENT PREGNANCY IN THIRD TRIMESTER: ICD-10-CM

## 2024-03-19 DIAGNOSIS — O10.013 PRE-EXISTING ESSENTIAL HYPERTENSION DURING PREGNANCY IN THIRD TRIMESTER: ICD-10-CM

## 2024-03-19 DIAGNOSIS — O99.213 MATERNAL MORBID OBESITY IN THIRD TRIMESTER, ANTEPARTUM: ICD-10-CM

## 2024-03-19 DIAGNOSIS — O24.313 PRE-EXISTING DIABETES MELLITUS DURING PREGNANCY IN THIRD TRIMESTER: ICD-10-CM

## 2024-03-19 LAB
GLUCOSE UR STRIP-MCNC: NEGATIVE MG/DL
PROT UR STRIP-MCNC: NEGATIVE MG/DL

## 2024-03-19 RX ORDER — GLUCOSAMINE HCL/CHONDROITIN SU 500-400 MG
1 CAPSULE ORAL 4 TIMES DAILY
Qty: 120 EACH | Refills: 3 | Status: SHIPPED | OUTPATIENT
Start: 2024-03-19 | End: 2024-03-21 | Stop reason: SDUPTHER

## 2024-03-19 RX ORDER — LANCETS 30 GAUGE
1 EACH MISCELLANEOUS 4 TIMES DAILY
Qty: 120 EACH | Refills: 3 | Status: SHIPPED | OUTPATIENT
Start: 2024-03-19

## 2024-03-19 RX ORDER — BLOOD-GLUCOSE METER
1 KIT MISCELLANEOUS 4 TIMES DAILY
Qty: 1 EACH | Refills: 0 | Status: SHIPPED | OUTPATIENT
Start: 2024-03-19

## 2024-03-19 RX ORDER — LABETALOL 300 MG/1
300 TABLET, FILM COATED ORAL 3 TIMES DAILY
Qty: 60 TABLET | Refills: 5 | Status: SHIPPED | OUTPATIENT
Start: 2024-03-19

## 2024-03-19 NOTE — PROGRESS NOTES
OB follow up     Fany Brunson is a 39 y.o.  29w0d being seen today for her obstetrical visit.  Patient reports no complaints. Fetal movement: normal.    Needs a refill on glucose test trips    Her prenatal care is complicated by (and status):  Chronic HTN, Diabetes perexhistant to pregnancy, Grand multip, prior , obesity, anxiety and UTI/trichomoniasis     Review of Systems  Cramping/contractions : None   Vaginal bleeding: none   Fetal movement good     /89   Wt 117 kg (259 lb)   LMP 2023   BMI 44.46 kg/m²     Prenatal Assessment  Fetal Heart Rate: 136  Fundal Height (cm): 30 cm  Movement: Present  Edema  LLE Edema: Trace  RLE Edema: Trace  Facial Edema: None        Assessment    Diagnoses and all orders for this visit:    1. Advanced maternal age in multigravida, third trimester (Primary)  -     POC Urinalysis Dipstick    2. Pre-existing diabetes mellitus during pregnancy in third trimester    3. Grand multiparity with current pregnancy in third trimester    4. Previous  delivery, antepartum    5. Maternal morbid obesity in third trimester, antepartum    6. Pre-existing essential hypertension during pregnancy in third trimester    Other orders  -     labetalol (NORMODYNE) 300 MG tablet; Take 1 tablet by mouth 3 (Three) Times a Day.  Dispense: 60 tablet; Refill: 5  -     Glucose Blood (Blood Glucose Test) strip; 1 each by In Vitro route 4 (Four) Times a Day. Test fasting and 2 hours after meals  Dispense: 120 each; Refill: 3  -     glucose monitor monitoring kit; Use 1 each 4 (Four) Times a Day.  Dispense: 1 each; Refill: 0  -     Lancets misc; Use 1 each 4 (Four) Times a Day. Use to test fasting and 2 hours after meals  Dispense: 120 each; Refill: 3        1) pregnancy at 29w0d   2) Diabetes   Found early in pregnancy, suspect pre-gestational   BS in range completely   On Lantus 35 AM and 27 PM  Short 22 with meals   HgA1c last visit 5.6% - normal CMP   Now that controlled,  does need Fetal echo    MFM looking to arrange Fetal Echo, encouraging ophthalmology   Scheduled back with MFM in 3 weeks   Will have her see us back in 2 weeks, See how MFM wants to arrange  testing from 3 weeks   3) Chronic HTN   On procardia 60 mg XL BID and labetalol 300 mg BID   On ASA   BP moderately elevated   Seen by MFM last Friday - significantly elevated   PIH labs in OB ED were normal including normal P/C   Will increase Labetalol to TID   4) AMA   Normal NIPT   5) Grand multiparity   5 prior C-Sections, declined sterilization   6) Prior    Again scheduled on 24 currently   7) obesity   Pre pregnancy BMI > 35   Weight gain since 10 weeks is 11# - so okay   8) UTI, recurrent   On suppression   Still have not done negative culture to show this is working, but proteinuria has improved       Plan    Reviewed this stage of pregnancy  Problem list updated   Follow up in 2 weeks    Salvador Carson MD   3/19/2024  12:27 EDT

## 2024-03-21 ENCOUNTER — TELEPHONE (OUTPATIENT)
Dept: OBSTETRICS AND GYNECOLOGY | Facility: CLINIC | Age: 39
End: 2024-03-21
Payer: MEDICARE

## 2024-03-21 ENCOUNTER — SPECIALTY PHARMACY (OUTPATIENT)
Dept: ENDOCRINOLOGY | Age: 39
End: 2024-03-21
Payer: MEDICARE

## 2024-03-21 RX ORDER — GLUCOSAMINE HCL/CHONDROITIN SU 500-400 MG
CAPSULE ORAL
Qty: 200 EACH | Refills: 8 | Status: SHIPPED | OUTPATIENT
Start: 2024-03-21

## 2024-03-21 RX ORDER — GLUCOSAMINE HCL/CHONDROITIN SU 500-400 MG
CAPSULE ORAL
Qty: 200 EACH | Refills: 8 | Status: SHIPPED | OUTPATIENT
Start: 2024-03-21 | End: 2024-03-21 | Stop reason: SDUPTHER

## 2024-03-21 NOTE — TELEPHONE ENCOUNTER
Mary message received from Fany noting that she has been out of test strips since Sunday (3/17) night. Pt reports that she saw Dr. Carson in office on Tuesday (3/19) and notified OB of being out of test strips. OB office put in new prescription and has been trying to get a PA approved for the test strips since. The patient continues to report her [facility] pharmacy says the test strips still require a PA.This RN will call the pharmacy at this time to follow up on prescription further and will notify patient with any updated details.

## 2024-03-21 NOTE — PROGRESS NOTES
Specialty Pharmacy Patient Management Program  Endocrinology Initial Assessment     Fany Brunson was referred by an [facility] provider to the Endocrinology Patient Management program offered by [facility] for Other (Specify): pre-existing diabetes during pregnancy on 03/21/24.  An initial outreach was conducted, including assessment of therapy appropriateness and specialty medication education for test strips. The patient was introduced to services offered by [facility], including: regular assessments, refill coordination, curbside pick-up or mail order delivery options, prior authorization maintenance, and financial assistance programs as applicable. The patient was also provided with contact information for the pharmacy team.     Insurance Coverage & Financial Support  Medicare + medicaid    Relevant Past Medical History and Comorbidities  Relevant medical history and concomitant health conditions were discussed with the patient. The patient's chart has been reviewed for relevant past medical history and comorbid conditions and updated as necessary.  Past Medical History:   Diagnosis Date    Anxiety     Gestational diabetes     Herpes     HSV #1 - cold sores    Hypertension     chronic    Oligohydramnios in third trimester 10/15/2015    Formatting of this note might be different from the original.   2015 IMO UPDATE      Varicella      Social History     Socioeconomic History    Marital status:    Tobacco Use    Smoking status: Former     Types: Cigarettes    Smokeless tobacco: Never   Vaping Use    Vaping status: Never Used   Substance and Sexual Activity    Alcohol use: No    Drug use: No    Sexual activity: Yes     Partners: Male       Problem list reviewed by Yas Rodriguez, PharmD on 3/21/2024 at  2:13 PM    Allergies  Known allergies and reactions were discussed with the patient. The patient's chart has been reviewed for  allergy information and updated as  "necessary.   Allergies   Allergen Reactions    Latex Rash       Allergies reviewed by Yas Rodriguez, Eris on 3/21/2024 at  2:04 PM    Relevant Laboratory Values  Relevant laboratory values were discussed with the patient. The following specialty medication dose adjustment(s) are recommended: n/a  A1C Last 3 Results          11/7/2023    11:44 2/20/2024    10:09   HGBA1C Last 3 Results   Hemoglobin A1C 6.5  5.6      Lab Results   Component Value Date    HGBA1C 5.6 02/20/2024     Lab Results   Component Value Date    GLUCOSE 104 (H) 03/15/2024    CALCIUM 9.1 03/15/2024     03/15/2024    K 3.5 03/15/2024    CO2 18.0 (L) 03/15/2024     (H) 03/15/2024    BUN 15 03/15/2024    CREATININE 0.67 03/15/2024    EGFRIFAFRI >60 02/22/2023    EGFRIFNONA 132 03/21/2019    BCR 22.4 03/15/2024    ANIONGAP 12.0 03/15/2024     No results found for: \"CHOL\", \"CHLPL\", \"TRIG\", \"HDL\", \"LDL\", \"LDLDIRECT\"      Current Medication List  This medication list has been reviewed with the patient and evaluated for any interactions or necessary modifications/recommendations, and updated to include all prescription medications, OTC medications, and supplements the patient is currently taking.  This list reflects what is contained in the patient's profile, which has also been marked as reviewed to communicate to other providers it is the most up to date version of the patient's current medication therapy.     Current Outpatient Medications:     aspirin 81 MG chewable tablet, Chew 1 tablet Daily., Disp: 30 tablet, Rfl: 7    Blood Glucose Monitoring Suppl (OneTouch Verio Flex System) w/Device kit, , Disp: , Rfl:     glucose (B-D GLUCOSE) 5 g chewable tablet, Chew 3 tablets As Needed for Low Blood Sugar. (Patient not taking: Reported on 3/15/2024), Disp: 50 tablet, Rfl: 6    Glucose Blood (Blood Glucose Test) strip, Please dispense test strips compatible with the One Touch glucometer. Currently testing blood sugar 7 times a day., Disp: 200 " each, Rfl: 8    glucose monitor monitoring kit, Use 1 each 4 (Four) Times a Day., Disp: 1 each, Rfl: 0    hydrOXYzine pamoate (VISTARIL) 25 MG capsule, Take 1 capsule by mouth 4 (Four) Times a Day As Needed for Anxiety. (Patient not taking: Reported on 3/15/2024), Disp: 120 capsule, Rfl: 4    Insulin Glargine (LANTUS SOLOSTAR) 100 UNIT/ML injection pen, Inject 35 Units under the skin into the appropriate area as directed Daily With Breakfast AND 27 Units every night at bedtime. I, Disp: 100 mL, Rfl: 3    Insulin Lispro, 1 Unit Dial, (HUMALOG) 100 UNIT/ML solution pen-injector, Inject 22 Units under the skin into the appropriate area as directed 3 (Three) Times a Day Before Meals., Disp: 15 mL, Rfl: 11    Insulin Pen Needle (Droplet Pen Needles) 32G X 4 MM misc, USE 1 PEN NEEDLE FOUR TIMES A DAY, Disp: 300 each, Rfl: 1    labetalol (NORMODYNE) 300 MG tablet, Take 1 tablet by mouth 3 (Three) Times a Day., Disp: 60 tablet, Rfl: 5    Lancets misc, Use 1 each 4 (Four) Times a Day. Use to test fasting and 2 hours after meals, Disp: 120 each, Rfl: 3    NIFEdipine CC (ADALAT CC) 60 MG 24 hr tablet, Take 1 tablet by mouth 2 (Two) Times a Day. (Patient taking differently: Take 90 mg by mouth 2 (Two) Times a Day. 90mg BID), Disp: 90 tablet, Rfl: 2    nitrofurantoin, macrocrystal-monohydrate, (Macrobid) 100 MG capsule, Take 1 capsule by mouth Daily., Disp: 30 capsule, Rfl: 5    Prenatal MV-Min-Fe Fum-FA-DHA (Prenatal Multivitamin + DHA) 28-0.8 & 200 MG misc, , Disp: , Rfl:     Medicines reviewed by Yas Rodriguez, PharmD on 3/21/2024 at  2:13 PM    Drug Interactions  none  Recommended Medications Assessment  Aspirin: Currently Taking   Statin: Not Taking Currently  ACEi/ARB: Not Taking Currently    Initial Education Provided for Specialty Medication  The patient has been provided with the following education and any applicable administration techniques (i.e. self-injection) have been demonstrated for the therapies indicated. All  questions and concerns have been addressed prior to the patient receiving the medication, and the patient has verbalized comprehension of the education and any materials provided. Additional patient education shall be provided and documented upon request by the patient, provider, or payer.    Adherence and Self-Administration  Adherence related to the patient's specialty therapy was discussed with the patient. The Adherence segment of this outreach has been reviewed and updated.     Is there a concern with patient's ability to self administer the medication correctly and without issue?: No  Were any potential barriers to adherence identified during the initial assessment or patient education?: No  Are there any concerns regarding the patient's understanding of the importance of medication adherence?: No  Methods for Supporting Patient Adherence and/or Self-Administration: n/a    Open Medication Therapy Problems  No medication therapy recommendations to display    Goals of Therapy  Goals related to the patient's specialty therapy were discussed with the patient. The Patient Goals segment of this outreach has been reviewed and updated.   Goals Addressed Today        Specialty Pharmacy General Goal      A1c < 7%    Lab Results   Component Value Date    HGBA1C 5.6 02/20/2024    HGBA1C 6.5 (H) 11/07/2023    HGBA1C 5.1 02/19/2019    HGBA1C 4.90 11/29/2018    HGBA1C 5.80 (H) 08/23/2018                 Reassessment Plan & Follow-Up  1. Medication Therapy Changes: No changes  2. Related Plans, Therapy Recommendations, or Therapy Problems to Be Addressed: none  3. Pharmacist to perform regular assessments no more than (6) months from the previous assessment.  4. Care Coordinator to set up future refill outreaches, coordinate prescription delivery, and escalate clinical questions to pharmacist.  5. Welcome information and patient satisfaction survey to be sent by specialty pharmacy team with patient's initial fill.      Specialty Pharmacy Patient Management Program  Endocrinology Refill Outreach      Fany is a 39 y.o. female contacted today regarding refills of her medication(s).    Specialty medication(s) and dose(s) confirmed: none  Other medication(s) being refilled: test strips    Refill Questions      Flowsheet Row Most Recent Value   Changes to allergies? No   Changes to medications? No   New conditions or infections since last clinic visit No   Unplanned office visit, urgent care, ED, or hospital admission in the last 4 weeks  No   How does patient/caregiver feel medication is working? Good   Financial problems or insurance changes  No   Since the previous refill, were any specialty medication doses or scheduled injections missed or delayed?  No   Does this patient require a clinical escalation to a pharmacist? No          Delivery Questions      Flowsheet Row Most Recent Value   Delivery method Beeline   Delivery address verified with patient/caregiver? Yes   Delivery address Home   Number of medications in delivery 1   Medication(s) being filled and delivered Glucose Blood   Copay verified? Yes   Copay amount 0   Copay form of payment No copayment ($0)            Follow-Up: 28 days    Attestation  Therapeutic appropriateness: Appropriate   I attest the patient was actively involved in and has agreed to the above plan of care. If the prescribed therapy is at any point deemed not appropriate based on the current or future assessments, a consultation will be initiated with the patient's specialty care provider to determine the best course of action. The revised plan of therapy will be documented along with any required assessments and/or additional patient education provided.     Yas Rodriguez, PharmD  Clinical Specialty Pharmacist, Endocrinology  3/21/2024  14:35 EDT

## 2024-03-25 ENCOUNTER — TELEPHONE (OUTPATIENT)
Dept: OBSTETRICS AND GYNECOLOGY | Facility: CLINIC | Age: 39
End: 2024-03-25
Payer: MEDICARE

## 2024-03-25 NOTE — TELEPHONE ENCOUNTER
Spoke to pharmacy, to soon to  Glucose Strips- can  04/11/2024.   After speaking with pt, pt was given test strips at another pharmacy by CAMILO.     Hilary

## 2024-03-26 ENCOUNTER — DOCUMENTATION (OUTPATIENT)
Dept: OBSTETRICS AND GYNECOLOGY | Facility: CLINIC | Age: 39
End: 2024-03-26
Payer: MEDICARE

## 2024-03-26 NOTE — PROGRESS NOTES
Weekly MFM glucose log evaluation. The following adjustments were made:    Lantus:   Humalo    MFM will review glucose logs again on .

## 2024-03-31 NOTE — PROGRESS NOTES
OB VISIT 31 WEEKS      Chief Complaint   Patient presents with    Routine Prenatal Visit         Fany is a 39 y.o.  31w0d being seen today for her obstetrical visit.  Patient reports no complaints. Fetal movement: normal. The patient currently sees Dr. Carson.       REVIEW OF SYSTEMS  Cramping/contractions: denies  Vaginal bleeding: denies  Fetal movement: present  Leaking of fluid: denies    Review of Systems   Eyes:  Negative for blurred vision, double vision and visual disturbance.   Gastrointestinal:  Negative for abdominal pain.   Neurological:  Negative for light-headedness and headache.   All other systems reviewed and are negative.      /85   Wt 121 kg (266 lb)   LMP 2023   BMI 45.66 kg/m²        Edema  LLE Edema: None  RLE Edema: None  Facial Edema: None    Physical Exam  Constitutional:       General: She is awake.      Appearance: Normal appearance. She is well-developed and well-groomed.   HENT:      Head: Normocephalic and atraumatic.   Pulmonary:      Effort: Pulmonary effort is normal.   Musculoskeletal:      Cervical back: Normal range of motion.   Neurological:      General: No focal deficit present.      Mental Status: She is alert and oriented to person, place, and time.   Skin:     General: Skin is warm and dry.   Psychiatric:         Mood and Affect: Mood normal.         Behavior: Behavior normal. Behavior is cooperative.   Vitals reviewed.         ASSESSMENT/PLAN    Diagnoses and all orders for this visit:    1. Third trimester pregnancy (Primary)  -     POC Urinalysis Dipstick    2. 31 weeks gestation of pregnancy  -     POC Urinalysis Dipstick         Urine dip today: negative protein, negative glucose.   Labs reviewed.   Discussed PP birth control options: patient undecided.   Reviewed blood sugars.   Reviewed this stage of pregnancy.  Problem list updated.     Follow up in 2 weeks with Dr. Carson.     I spent 15 minutes caring for Fany on this date of service.  This time includes time spent by me in the following activities: preparing for the visit, reviewing tests, obtaining and/or reviewing a separately obtained history, performing a medically appropriate examination and/or evaluation, counseling and educating the patient/family/caregiver, ordering medications, tests, or procedures, referring and communicating with other health care professionals, documenting information in the medical record, independently interpreting results and communicating that information with the patient/family/caregiver, and care coordination.     Lona Brunson CNM  4/2/2024  10:53 EDT

## 2024-04-01 ENCOUNTER — TRANSCRIBE ORDERS (OUTPATIENT)
Dept: ULTRASOUND IMAGING | Facility: HOSPITAL | Age: 39
End: 2024-04-01
Payer: MEDICARE

## 2024-04-01 DIAGNOSIS — O10.913 CHRONIC HYPERTENSION WITH EXACERBATION DURING PREGNANCY IN THIRD TRIMESTER: Primary | ICD-10-CM

## 2024-04-01 DIAGNOSIS — O24.312 PRE-EXISTING DIABETES MELLITUS DURING PREGNANCY IN SECOND TRIMESTER: ICD-10-CM

## 2024-04-01 DIAGNOSIS — O34.219 PREVIOUS CESAREAN DELIVERY, ANTEPARTUM: ICD-10-CM

## 2024-04-02 ENCOUNTER — ROUTINE PRENATAL (OUTPATIENT)
Dept: OBSTETRICS AND GYNECOLOGY | Facility: CLINIC | Age: 39
End: 2024-04-02
Payer: MEDICARE

## 2024-04-02 VITALS — SYSTOLIC BLOOD PRESSURE: 143 MMHG | DIASTOLIC BLOOD PRESSURE: 85 MMHG | WEIGHT: 266 LBS | BODY MASS INDEX: 45.66 KG/M2

## 2024-04-02 DIAGNOSIS — Z3A.31 31 WEEKS GESTATION OF PREGNANCY: ICD-10-CM

## 2024-04-02 DIAGNOSIS — Z34.93 THIRD TRIMESTER PREGNANCY: Primary | ICD-10-CM

## 2024-04-02 LAB
GLUCOSE UR STRIP-MCNC: NEGATIVE MG/DL
PROT UR STRIP-MCNC: NEGATIVE MG/DL

## 2024-04-02 PROCEDURE — 0502F SUBSEQUENT PRENATAL CARE: CPT

## 2024-04-09 ENCOUNTER — HOSPITAL ENCOUNTER (OUTPATIENT)
Dept: ULTRASOUND IMAGING | Facility: HOSPITAL | Age: 39
Discharge: HOME | End: 2024-04-09
Admitting: NURSE PRACTITIONER
Payer: MEDICARE

## 2024-04-09 DIAGNOSIS — O24.313 PRE-EXISTING DIABETES MELLITUS DURING PREGNANCY IN THIRD TRIMESTER: ICD-10-CM

## 2024-04-09 PROCEDURE — 76825 ECHO EXAM OF FETAL HEART: CPT

## 2024-04-09 PROCEDURE — 76827 ECHO EXAM OF FETAL HEART: CPT

## 2024-04-09 PROCEDURE — 93325 DOPPLER ECHO COLOR FLOW MAPG: CPT

## 2024-04-09 NOTE — PROGRESS NOTES
Fetal Cardiology Outpatient Consult  Note    Patient Name:Fany Brunson  :1985  Medical Record Number:[id number]  Date of Service:2024  Requesting Obstetrician: Dr. Galina Rivera, [facility]  Primary Obstetrician: Dr. Salvador Carson  Consult Location: [facility]    Reason for Visit:  Maternal Diabetes    History: I had the pleasure of seeing your patient, Fany Brunson, today for a Fetal Cardiology Initial Consultation.  Fetal cardiology evaluation was requested due to Maternal Diabetes.      Fany is a 39 y.o. woman who presents today at 32 weeks gestation, with a given due date of 2024.  This pregnancy was conceived naturally.       OB History          7    Para   6    Term   6            AB        Living   6         SAB        IAB        Ectopic        Molar        Multiple   0    Live Births   6                Past Medical History:  Past Medical History:   Diagnosis Date    Anxiety     Gestational diabetes     Herpes     HSV #1 - cold sores    Hypertension     chronic    Oligohydramnios in third trimester 10/15/2015    Formatting of this note might be different from the original.   2015 IMO UPDATE      Varicella        Current Medications:    Current Outpatient Medications:     aspirin 81 MG chewable tablet, Chew 1 tablet Daily., Disp: 30 tablet, Rfl: 7    Blood Glucose Monitoring Suppl (OneTouch Verio Flex System) w/Device kit, , Disp: , Rfl:     glucose (B-D GLUCOSE) 5 g chewable tablet, Chew 3 tablets As Needed for Low Blood Sugar. (Patient not taking: Reported on 3/15/2024), Disp: 50 tablet, Rfl: 6    Glucose Blood (Blood Glucose Test) strip, Please dispense test strips compatible with the One Touch glucometer. Currently testing blood sugar 7 times a day., Disp: 200 each, Rfl: 8    glucose monitor monitoring kit, Use 1 each 4 (Four) Times a Day., Disp: 1 each, Rfl: 0    hydrOXYzine pamoate (VISTARIL) 25 MG capsule, Take 1 capsule  by mouth 4 (Four) Times a Day As Needed for Anxiety. (Patient not taking: Reported on 3/15/2024), Disp: 120 capsule, Rfl: 4    Insulin Glargine (LANTUS SOLOSTAR) 100 UNIT/ML injection pen, Inject 35 Units under the skin into the appropriate area as directed Daily With Breakfast AND 27 Units every night at bedtime. I, Disp: 100 mL, Rfl: 3    Insulin Lispro, 1 Unit Dial, (HUMALOG) 100 UNIT/ML solution pen-injector, Inject 22 Units under the skin into the appropriate area as directed 3 (Three) Times a Day Before Meals., Disp: 15 mL, Rfl: 11    Insulin Pen Needle (Droplet Pen Needles) 32G X 4 MM misc, USE 1 PEN NEEDLE FOUR TIMES A DAY, Disp: 300 each, Rfl: 1    labetalol (NORMODYNE) 300 MG tablet, Take 1 tablet by mouth 3 (Three) Times a Day., Disp: 60 tablet, Rfl: 5    Lancets misc, Use 1 each 4 (Four) Times a Day. Use to test fasting and 2 hours after meals, Disp: 120 each, Rfl: 3    NIFEdipine CC (ADALAT CC) 60 MG 24 hr tablet, Take 1 tablet by mouth 2 (Two) Times a Day. (Patient taking differently: Take 90 mg by mouth 2 (Two) Times a Day. 90mg BID), Disp: 90 tablet, Rfl: 2    nitrofurantoin, macrocrystal-monohydrate, (Macrobid) 100 MG capsule, Take 1 capsule by mouth Daily., Disp: 30 capsule, Rfl: 5    Prenatal MV-Min-Fe Fum-FA-DHA (Prenatal Multivitamin + DHA) 28-0.8 & 200 MG misc, , Disp: , Rfl:     Family History:  Family History   Problem Relation Age of Onset    Stroke Father     Breast cancer Paternal Grandmother         Passed at age 39 from breast cancer    Ovarian cancer Neg Hx     Uterine cancer Neg Hx     Colon cancer Neg Hx     Deep vein thrombosis Neg Hx     Pulmonary embolism Neg Hx      There is no known family history of congenital heart disease or genetic abnormalities or early childhood death.    Imaging: A complete 2-D, color, and spectral doppler fetal echocardiogram was performed.  A full report is available separately.  In summary, today's findings show the following:     - Normal fetal cardiac  anatomy and function at 32 weeks gestation.      A complete, detailed fetal echocardiogram can identify most major heart defects.  However, there are known limitations to this examination including a limited ability to diagnose some small atrial or ventricular septal defects, minor valve abnormalities, persistent patency of the ductus arteriosus, coarctation of the aorta, and abnormalities in small structures such as coronary arteries and pulmonary veins.    Discussion:   Findings were explained to patient.  The echo display screens in our examination room were used.  Questions were answered at length and patient expressed understanding.  I explained the normal fetal circulation, today's fetal echocardiogram findings, the expected course of pregnancy, the impact of today's results on the location and mode of delivery and the expected  course.     Impression: In summary, today's evaluation found the followin) Normal fetal cardiac anatomy and function   2) 32 weeks gestation    Recommendations:   1. There is no evidence of a ductal dependent fetal cardiac lesion.  I do not anticipate the need for immediate initiation of prostaglandin therapy and pediatric cardiology evaluation after birth.  2. There is no fetal cardiac indication to delivery at a hospital which provides specialized pediatric cardiac care.  Fany is currently planning to delivery at [facility], which is appropriate from the standpoint of the fetal heart and circulation.  3. There is no increased fetal cardiac risk from a trial of labor and vaginal or cesearian delivery based on today's fetal cardiac findings.  4. Based on the results of today's examination, no further fetal echocardiograms are recommended during pregnancy.  5. Recommend routine  and well  after birth by a primary care provider, further evaluation by pediatric cardiology is recommended only as needed.  6. I would be happy to see Fany  again during pregnancy for any changes, problems, or concerns that may arise.  Please do not hesitate to call with any questions you may have.    Thank you for allowing me to share with you in the care of your patient.    I personally spent 60 minutes of direct provider time for the visit today, including review of prior OB and MFM medical records, face-to-face discussion and review of fetal cardiac images in the examination room, and charting.     Orlando Campos MD  [facility]  Pediatric Cardiology  [phone]    4/9/2024  12:05 EDT

## 2024-04-11 ENCOUNTER — OFFICE VISIT (OUTPATIENT)
Dept: OBSTETRICS AND GYNECOLOGY | Facility: CLINIC | Age: 39
End: 2024-04-11
Payer: MEDICARE

## 2024-04-11 ENCOUNTER — HOSPITAL ENCOUNTER (OUTPATIENT)
Dept: ULTRASOUND IMAGING | Facility: HOSPITAL | Age: 39
Discharge: HOME | End: 2024-04-11
Payer: MEDICARE

## 2024-04-11 ENCOUNTER — HOSPITAL ENCOUNTER (INPATIENT)
Facility: HOSPITAL | Age: 39
LOS: 3 days | Discharge: HOME | End: 2024-04-14
Attending: OBSTETRICS & GYNECOLOGY | Admitting: OBSTETRICS & GYNECOLOGY
Payer: MEDICARE

## 2024-04-11 VITALS
HEART RATE: 82 BPM | DIASTOLIC BLOOD PRESSURE: 98 MMHG | TEMPERATURE: 98.7 F | WEIGHT: 269 LBS | BODY MASS INDEX: 45.93 KG/M2 | SYSTOLIC BLOOD PRESSURE: 158 MMHG | HEIGHT: 64 IN

## 2024-04-11 DIAGNOSIS — O34.219 PREVIOUS CESAREAN DELIVERY, ANTEPARTUM: ICD-10-CM

## 2024-04-11 DIAGNOSIS — O24.113 TYPE 2 DIABETES MELLITUS IN PREGNANCY, THIRD TRIMESTER: ICD-10-CM

## 2024-04-11 DIAGNOSIS — O09.43 GRAND MULTIPARITY WITH CURRENT PREGNANCY IN THIRD TRIMESTER: ICD-10-CM

## 2024-04-11 DIAGNOSIS — O10.913 CHRONIC HYPERTENSION WITH EXACERBATION DURING PREGNANCY IN THIRD TRIMESTER: ICD-10-CM

## 2024-04-11 DIAGNOSIS — Z98.891 STATUS POST CESAREAN DELIVERY: Primary | ICD-10-CM

## 2024-04-11 DIAGNOSIS — O10.012 PRE-EXISTING ESSENTIAL HYPERTENSION DURING PREGNANCY IN SECOND TRIMESTER: ICD-10-CM

## 2024-04-11 DIAGNOSIS — O24.312 PRE-EXISTING DIABETES MELLITUS DURING PREGNANCY IN SECOND TRIMESTER: ICD-10-CM

## 2024-04-11 DIAGNOSIS — G89.18 POSTOPERATIVE PAIN: ICD-10-CM

## 2024-04-11 DIAGNOSIS — O36.8390 FETAL ARRHYTHMIA AFFECTING PREGNANCY, ANTEPARTUM: ICD-10-CM

## 2024-04-11 DIAGNOSIS — O09.42 GRAND MULTIPARITY WITH CURRENT PREGNANCY IN SECOND TRIMESTER: ICD-10-CM

## 2024-04-11 DIAGNOSIS — O09.523 ADVANCED MATERNAL AGE IN MULTIGRAVIDA, THIRD TRIMESTER: Primary | ICD-10-CM

## 2024-04-11 PROBLEM — Z34.90 PREGNANCY: Status: RESOLVED | Noted: 2019-03-21 | Resolved: 2024-04-11

## 2024-04-11 PROBLEM — O24.313 PRE-EXISTING DIABETES MELLITUS DURING PREGNANCY IN THIRD TRIMESTER: Status: ACTIVE | Noted: 2024-03-05

## 2024-04-11 PROBLEM — O28.3 ABNORMAL ANTENATAL ULTRASOUND: Status: ACTIVE | Noted: 2024-04-11

## 2024-04-11 PROBLEM — K35.80 ACUTE APPENDICITIS: Status: RESOLVED | Noted: 2023-02-22 | Resolved: 2024-04-11

## 2024-04-11 PROBLEM — O10.013 PRE-EXISTING ESSENTIAL HYPERTENSION DURING PREGNANCY IN THIRD TRIMESTER: Status: ACTIVE | Noted: 2024-03-05

## 2024-04-11 LAB
ABO GROUP BLD: NORMAL
ALBUMIN SERPL-MCNC: 3.5 G/DL (ref 3.5–5.2)
ALBUMIN/GLOB SERPL: 1.2 G/DL
ALP SERPL-CCNC: 142 U/L (ref 39–117)
ALT SERPL W P-5'-P-CCNC: 13 U/L (ref 1–33)
ANION GAP SERPL CALCULATED.3IONS-SCNC: 12.8 MMOL/L (ref 5–15)
AST SERPL-CCNC: 8 U/L (ref 1–32)
BASOPHILS # BLD AUTO: 0.06 10*3/MM3 (ref 0–0.2)
BASOPHILS NFR BLD AUTO: 0.5 % (ref 0–1.5)
BILIRUB SERPL-MCNC: 0.2 MG/DL (ref 0–1.2)
BLD GP AB SCN SERPL QL: NEGATIVE
BUN SERPL-MCNC: 10 MG/DL (ref 6–20)
BUN/CREAT SERPL: 13.9 (ref 7–25)
CALCIUM SPEC-SCNC: 9.4 MG/DL (ref 8.6–10.5)
CHLORIDE SERPL-SCNC: 110 MMOL/L (ref 98–107)
CO2 SERPL-SCNC: 19.2 MMOL/L (ref 22–29)
CREAT SERPL-MCNC: 0.72 MG/DL (ref 0.57–1)
CREAT UR-MCNC: 40 MG/DL
DEPRECATED RDW RBC AUTO: 44.9 FL (ref 37–54)
EGFRCR SERPLBLD CKD-EPI 2021: 109.2 ML/MIN/1.73
EOSINOPHIL # BLD AUTO: 0.27 10*3/MM3 (ref 0–0.4)
EOSINOPHIL NFR BLD AUTO: 2.4 % (ref 0.3–6.2)
ERYTHROCYTE [DISTWIDTH] IN BLOOD BY AUTOMATED COUNT: 14 % (ref 12.3–15.4)
GLOBULIN UR ELPH-MCNC: 3 GM/DL
GLUCOSE BLDC GLUCOMTR-MCNC: 100 MG/DL (ref 70–130)
GLUCOSE BLDC GLUCOMTR-MCNC: 103 MG/DL (ref 70–130)
GLUCOSE BLDC GLUCOMTR-MCNC: 106 MG/DL (ref 70–130)
GLUCOSE BLDC GLUCOMTR-MCNC: 115 MG/DL (ref 70–130)
GLUCOSE BLDC GLUCOMTR-MCNC: 124 MG/DL (ref 70–130)
GLUCOSE BLDC GLUCOMTR-MCNC: 77 MG/DL (ref 70–130)
GLUCOSE SERPL-MCNC: 101 MG/DL (ref 65–99)
HCT VFR BLD AUTO: 40 % (ref 34–46.6)
HGB BLD-MCNC: 12.7 G/DL (ref 12–15.9)
IMM GRANULOCYTES # BLD AUTO: 0.04 10*3/MM3 (ref 0–0.05)
IMM GRANULOCYTES NFR BLD AUTO: 0.4 % (ref 0–0.5)
LDH SERPL-CCNC: 154 U/L (ref 135–214)
LYMPHOCYTES # BLD AUTO: 3.16 10*3/MM3 (ref 0.7–3.1)
LYMPHOCYTES NFR BLD AUTO: 27.9 % (ref 19.6–45.3)
MCH RBC QN AUTO: 27.4 PG (ref 26.6–33)
MCHC RBC AUTO-ENTMCNC: 31.8 G/DL (ref 31.5–35.7)
MCV RBC AUTO: 86.2 FL (ref 79–97)
MONOCYTES # BLD AUTO: 0.55 10*3/MM3 (ref 0.1–0.9)
MONOCYTES NFR BLD AUTO: 4.9 % (ref 5–12)
NEUTROPHILS NFR BLD AUTO: 63.9 % (ref 42.7–76)
NEUTROPHILS NFR BLD AUTO: 7.23 10*3/MM3 (ref 1.7–7)
NRBC BLD AUTO-RTO: 0 /100 WBC (ref 0–0.2)
PLATELET # BLD AUTO: 276 10*3/MM3 (ref 140–450)
PMV BLD AUTO: 11.1 FL (ref 6–12)
POTASSIUM SERPL-SCNC: 4 MMOL/L (ref 3.5–5.2)
PROT ?TM UR-MCNC: 21 MG/DL
PROT SERPL-MCNC: 6.5 G/DL (ref 6–8.5)
PROT/CREAT UR: 525 MG/G CREA (ref 0–200)
RBC # BLD AUTO: 4.64 10*6/MM3 (ref 3.77–5.28)
RH BLD: POSITIVE
SODIUM SERPL-SCNC: 142 MMOL/L (ref 136–145)
T PALLIDUM IGG SER QL: NORMAL
T&S EXPIRATION DATE: NORMAL
URATE SERPL-MCNC: 4.6 MG/DL (ref 2.4–5.7)
WBC NRBC COR # BLD AUTO: 11.31 10*3/MM3 (ref 3.4–10.8)

## 2024-04-11 PROCEDURE — 76816 OB US FOLLOW-UP PER FETUS: CPT

## 2024-04-11 PROCEDURE — 59025 FETAL NON-STRESS TEST: CPT

## 2024-04-11 PROCEDURE — 82948 REAGENT STRIP/BLOOD GLUCOSE: CPT

## 2024-04-11 PROCEDURE — 99222 1ST HOSP IP/OBS MODERATE 55: CPT | Performed by: OBSTETRICS & GYNECOLOGY

## 2024-04-11 PROCEDURE — 63710000001 INSULIN GLARGINE PER 5 UNITS: Performed by: NURSE PRACTITIONER

## 2024-04-11 PROCEDURE — 76819 FETAL BIOPHYS PROFIL W/O NST: CPT

## 2024-04-11 PROCEDURE — 86901 BLOOD TYPING SEROLOGIC RH(D): CPT | Performed by: OBSTETRICS & GYNECOLOGY

## 2024-04-11 PROCEDURE — 59025 FETAL NON-STRESS TEST: CPT | Performed by: OBSTETRICS & GYNECOLOGY

## 2024-04-11 PROCEDURE — 84156 ASSAY OF PROTEIN URINE: CPT | Performed by: OBSTETRICS & GYNECOLOGY

## 2024-04-11 PROCEDURE — 82570 ASSAY OF URINE CREATININE: CPT | Performed by: OBSTETRICS & GYNECOLOGY

## 2024-04-11 PROCEDURE — 80053 COMPREHEN METABOLIC PANEL: CPT | Performed by: OBSTETRICS & GYNECOLOGY

## 2024-04-11 PROCEDURE — 25010000002 BETAMETHASONE ACET & SOD PHOS PER 4 MG: Performed by: OBSTETRICS & GYNECOLOGY

## 2024-04-11 PROCEDURE — 86850 RBC ANTIBODY SCREEN: CPT | Performed by: OBSTETRICS & GYNECOLOGY

## 2024-04-11 PROCEDURE — 83615 LACTATE (LD) (LDH) ENZYME: CPT | Performed by: NURSE PRACTITIONER

## 2024-04-11 PROCEDURE — 84550 ASSAY OF BLOOD/URIC ACID: CPT | Performed by: NURSE PRACTITIONER

## 2024-04-11 PROCEDURE — 86900 BLOOD TYPING SEROLOGIC ABO: CPT | Performed by: OBSTETRICS & GYNECOLOGY

## 2024-04-11 PROCEDURE — 86780 TREPONEMA PALLIDUM: CPT | Performed by: OBSTETRICS & GYNECOLOGY

## 2024-04-11 PROCEDURE — 76820 UMBILICAL ARTERY ECHO: CPT

## 2024-04-11 PROCEDURE — 86923 COMPATIBILITY TEST ELECTRIC: CPT

## 2024-04-11 PROCEDURE — 85025 COMPLETE CBC W/AUTO DIFF WBC: CPT | Performed by: NURSE PRACTITIONER

## 2024-04-11 PROCEDURE — 63710000001 INSULIN LISPRO (HUMAN) PER 5 UNITS: Performed by: NURSE PRACTITIONER

## 2024-04-11 RX ORDER — BISACODYL 10 MG
10 SUPPOSITORY, RECTAL RECTAL DAILY PRN
Status: DISCONTINUED | OUTPATIENT
Start: 2024-04-11 | End: 2024-04-14 | Stop reason: HOSPADM

## 2024-04-11 RX ORDER — SODIUM CHLORIDE 0.9 % (FLUSH) 0.9 %
10 SYRINGE (ML) INJECTION AS NEEDED
Status: DISCONTINUED | OUTPATIENT
Start: 2024-04-11 | End: 2024-04-11 | Stop reason: HOSPADM

## 2024-04-11 RX ORDER — NIFEDIPINE 10 MG/1
10-20 CAPSULE ORAL
Status: DISCONTINUED | OUTPATIENT
Start: 2024-04-11 | End: 2024-04-14 | Stop reason: HOSPADM

## 2024-04-11 RX ORDER — ACETAMINOPHEN 325 MG/1
650 TABLET ORAL EVERY 4 HOURS PRN
Status: DISCONTINUED | OUTPATIENT
Start: 2024-04-11 | End: 2024-04-14 | Stop reason: HOSPADM

## 2024-04-11 RX ORDER — BETAMETHASONE SODIUM PHOSPHATE AND BETAMETHASONE ACETATE 3; 3 MG/ML; MG/ML
12 INJECTION, SUSPENSION INTRA-ARTICULAR; INTRALESIONAL; INTRAMUSCULAR; SOFT TISSUE EVERY 24 HOURS
Status: DISCONTINUED | OUTPATIENT
Start: 2024-04-11 | End: 2024-04-12

## 2024-04-11 RX ORDER — ONDANSETRON 4 MG/1
8 TABLET, ORALLY DISINTEGRATING ORAL EVERY 8 HOURS PRN
Status: DISCONTINUED | OUTPATIENT
Start: 2024-04-11 | End: 2024-04-14 | Stop reason: HOSPADM

## 2024-04-11 RX ORDER — LIDOCAINE HYDROCHLORIDE 10 MG/ML
0.5 INJECTION, SOLUTION INFILTRATION; PERINEURAL ONCE AS NEEDED
Status: DISCONTINUED | OUTPATIENT
Start: 2024-04-11 | End: 2024-04-14 | Stop reason: HOSPADM

## 2024-04-11 RX ORDER — SODIUM CHLORIDE 0.9 % (FLUSH) 0.9 %
10 SYRINGE (ML) INJECTION AS NEEDED
Status: DISCONTINUED | OUTPATIENT
Start: 2024-04-11 | End: 2024-04-14 | Stop reason: HOSPADM

## 2024-04-11 RX ORDER — SODIUM CHLORIDE 9 MG/ML
40 INJECTION, SOLUTION INTRAVENOUS AS NEEDED
Status: DISCONTINUED | OUTPATIENT
Start: 2024-04-11 | End: 2024-04-11 | Stop reason: HOSPADM

## 2024-04-11 RX ORDER — SODIUM CHLORIDE 0.9 % (FLUSH) 0.9 %
10 SYRINGE (ML) INJECTION EVERY 12 HOURS SCHEDULED
Status: DISCONTINUED | OUTPATIENT
Start: 2024-04-11 | End: 2024-04-14 | Stop reason: HOSPADM

## 2024-04-11 RX ORDER — INSULIN LISPRO 100 [IU]/ML
22 INJECTION, SOLUTION INTRAVENOUS; SUBCUTANEOUS
Status: DISCONTINUED | OUTPATIENT
Start: 2024-04-11 | End: 2024-04-12

## 2024-04-11 RX ORDER — SODIUM CHLORIDE 9 MG/ML
40 INJECTION, SOLUTION INTRAVENOUS AS NEEDED
Status: DISCONTINUED | OUTPATIENT
Start: 2024-04-11 | End: 2024-04-14 | Stop reason: HOSPADM

## 2024-04-11 RX ORDER — ONDANSETRON 2 MG/ML
4 INJECTION INTRAMUSCULAR; INTRAVENOUS EVERY 8 HOURS PRN
Status: DISCONTINUED | OUTPATIENT
Start: 2024-04-11 | End: 2024-04-14 | Stop reason: HOSPADM

## 2024-04-11 RX ORDER — SODIUM CHLORIDE 0.9 % (FLUSH) 0.9 %
10 SYRINGE (ML) INJECTION EVERY 12 HOURS SCHEDULED
Status: DISCONTINUED | OUTPATIENT
Start: 2024-04-11 | End: 2024-04-11 | Stop reason: HOSPADM

## 2024-04-11 RX ORDER — CALCIUM CARBONATE 500 MG/1
1 TABLET, CHEWABLE ORAL DAILY PRN
Status: DISCONTINUED | OUTPATIENT
Start: 2024-04-11 | End: 2024-04-14 | Stop reason: HOSPADM

## 2024-04-11 RX ORDER — LABETALOL HYDROCHLORIDE 5 MG/ML
20-80 INJECTION, SOLUTION INTRAVENOUS
Status: DISCONTINUED | OUTPATIENT
Start: 2024-04-11 | End: 2024-04-14 | Stop reason: HOSPADM

## 2024-04-11 RX ORDER — DOCUSATE SODIUM 100 MG/1
100 CAPSULE, LIQUID FILLED ORAL 2 TIMES DAILY PRN
Status: DISCONTINUED | OUTPATIENT
Start: 2024-04-11 | End: 2024-04-14 | Stop reason: HOSPADM

## 2024-04-11 RX ORDER — LIDOCAINE HYDROCHLORIDE 10 MG/ML
0.5 INJECTION, SOLUTION INFILTRATION; PERINEURAL ONCE AS NEEDED
Status: DISCONTINUED | OUTPATIENT
Start: 2024-04-11 | End: 2024-04-11 | Stop reason: HOSPADM

## 2024-04-11 RX ORDER — INSULIN LISPRO 100 [IU]/ML
22 INJECTION, SOLUTION INTRAVENOUS; SUBCUTANEOUS
Status: DISCONTINUED | OUTPATIENT
Start: 2024-04-12 | End: 2024-04-12

## 2024-04-11 RX ORDER — HYDRALAZINE HYDROCHLORIDE 20 MG/ML
5-10 INJECTION INTRAMUSCULAR; INTRAVENOUS
Status: DISCONTINUED | OUTPATIENT
Start: 2024-04-11 | End: 2024-04-14 | Stop reason: HOSPADM

## 2024-04-11 RX ADMIN — Medication 10 ML: at 20:31

## 2024-04-11 RX ADMIN — NIFEDIPINE 90 MG: 60 TABLET, FILM COATED, EXTENDED RELEASE ORAL at 20:32

## 2024-04-11 RX ADMIN — INSULIN LISPRO 22 UNITS: 100 INJECTION, SOLUTION INTRAVENOUS; SUBCUTANEOUS at 15:13

## 2024-04-11 RX ADMIN — INSULIN LISPRO 22 UNITS: 100 INJECTION, SOLUTION INTRAVENOUS; SUBCUTANEOUS at 18:26

## 2024-04-11 RX ADMIN — INSULIN GLARGINE 29 UNITS: 100 INJECTION, SOLUTION SUBCUTANEOUS at 21:50

## 2024-04-11 RX ADMIN — BETAMETHASONE SODIUM PHOSPHATE AND BETAMETHASONE ACETATE 12 MG: 3; 3 INJECTION, SUSPENSION INTRA-ARTICULAR; INTRALESIONAL; INTRAMUSCULAR at 17:24

## 2024-04-11 RX ADMIN — LABETALOL HYDROCHLORIDE 300 MG: 100 TABLET, FILM COATED ORAL at 17:22

## 2024-04-11 NOTE — H&P
[facility]   HISTORY AND PHYSICAL    Patient Name:Fany Brunson  : 1985  MRN: [id number]  Primary Care Physician: Sagar, No Known  Date of admission: 2024    Subjective   Subjective     Chief Complaint: Sent from office    History of Present Illness   Fany Brunson is a 39 y.o. female  @ 32w2d   Seen today by [facility] for follow up of concern seen on fetal echo with elevated doppler. - Confirmed elevated doppler with intermittently absent end diastolic flows. BPP was 6/8 - 2 off for no tone. BP was just below severe range, so sent for evaluation of possible superimposed pre-eclampsia. Infant is NOT growth restricted. Prenatal care complicated by - AMA, normal NIPT, Pre-existing DM (found with HgA1c of 6.5% at intake to pregnancy)- [facility] has been managing her DM as it was difficult to control, Chronic hypertension - current regimen is Labetalol 300 mg TID and procardia XL 90 mg BID still again with moderate elevation. Baseline P/C was difficult due to chronic UTI that took awhile to clear.  I did in Feb and March get two that were < 300 mg.- Grand multiparity with all prior 6 deliveries by      Review of Systems   Constitutional:  Negative for fever.   Eyes:  Negative for visual disturbance.   Respiratory:  Negative for shortness of breath.    Cardiovascular:  Negative for chest pain.   Gastrointestinal:  Negative for abdominal pain.   Genitourinary:  Negative for pelvic pain, vaginal bleeding and vaginal discharge.   Neurological:  Negative for headaches.   All other systems reviewed and are negative.        Personal History       OB History    Para Term  AB Living   7 6 6     6   SAB IAB Ectopic Molar Multiple Live Births             6      # Outcome Date GA Lbr Rosalio/2nd Weight Sex Type Anes PTL Lv   7 Current            6 Term 19 37w4d  3775 g (8 lb 5.2 oz) M CS-LTranv Spinal N ASHLEY      Birth Comments: panda 2   5 Term 10/15/15 37w3d  3501 g (7 lb 11.5 oz) M  CS-LTranv Spinal N ASHLEY      Complications: Hypertension affecting pregnancy, Gestational diabetes   4 Term 11 39w0d  3657 g (8 lb 1 oz) M CS-Unspec Spinal, EPI N ASHLEY   3 Term 09 39w0d  3771 g (8 lb 5 oz) M CS-Unspec Spinal, EPI N ASHLEY   2 Term 08 39w0d  4054 g (8 lb 15 oz) M CS-Unspec Spinal, EPI N ASHLEY   1 Term 06 40w0d  4252 g (9 lb 6 oz) M CS-Unspec Spinal, EPI N ASHLEY      Complications: Cephalopelvic Disproportion, Meconium aspiration        Past Medical History:   Diagnosis Date    Acute appendicitis 2023    Formatting of this note might be different from the original.   Added automatically from request for surgery [id number]      Anxiety     Gestational diabetes     Herpes     HSV #1 - cold sores    Hypertension     chronic    Oligohydramnios in third trimester 10/15/2015    Formatting of this note might be different from the original.   2015 IMO UPDATE      Varicella        Past Surgical History:   Procedure Laterality Date    APPENDECTOMY      BREAST BIOPSY  10/2022     SECTION       SECTION Bilateral 2019    Procedure:  SECTION REPEAT;  Surgeon: Salvador Carson MD;  Location: [facility] LABOR DELIVERY;  Service: Obstetrics/Gynecology    CHOLECYSTECTOMY      CHOLECYSTECTOMY      LAPAROSCOPIC CHOLECYSTECTOMY      WRIST SURGERY         Family History: Her family history includes Breast cancer in her paternal grandmother; Stroke in her father.     Social History: She  reports that she has quit smoking. Her smoking use included cigarettes. She has never used smokeless tobacco. She reports that she does not drink alcohol and does not use drugs.    Home Medications:  Insulin Glargine, Insulin Lispro (1 Unit Dial), Insulin Pen Needle, Lancets, NIFEdipine CC, OneTouch Verio Flex System, Prenatal Multivitamin + DHA, aspirin, glucose, glucose blood, glucose monitor, hydrOXYzine pamoate, labetalol, and nitrofurantoin  (macrocrystal-monohydrate)    Allergies:  She is allergic to latex.    Objective    Objective     Vitals:    Temp:  [98.2 °F (36.8 °C)-98.7 °F (37.1 °C)] 98.2 °F (36.8 °C)  Heart Rate:  [81-95] 95  Resp:  [16] 16  BP: (148-169)/(80-98) 148/84    Physical Exam  Vitals reviewed.   Constitutional:       General: She is not in acute distress.     Appearance: She is well-developed. She is obese.   HENT:      Head: Normocephalic and atraumatic.   Eyes:      General:         Right eye: No discharge.         Left eye: No discharge.      Conjunctiva/sclera: Conjunctivae normal.   Neck:      Thyroid: No thyromegaly.   Cardiovascular:      Rate and Rhythm: Normal rate and regular rhythm.      Heart sounds: Normal heart sounds. No murmur heard.  Pulmonary:      Effort: Pulmonary effort is normal. No respiratory distress.      Breath sounds: Normal breath sounds.   Abdominal:      General: There is distension (c/w third trimester).      Palpations: Abdomen is soft.      Tenderness: There is no abdominal tenderness.   Musculoskeletal:         General: No tenderness. Normal range of motion.      Cervical back: Normal range of motion and neck supple.      Right lower leg: Edema (1+ edema) present.      Left lower leg: Edema present.   Lymphadenopathy:      Cervical: No cervical adenopathy.   Skin:     General: Skin is warm and dry.      Findings: No rash.   Neurological:      General: No focal deficit present.      Mental Status: She is alert and oriented to person, place, and time.      Deep Tendon Reflexes: Reflexes normal (2+ DTRs).   Psychiatric:         Behavior: Behavior normal.         Thought Content: Thought content normal.         Judgment: Judgment normal.          Result Review      Lab Results   Component Value Date    WBC 11.31 (H) 04/11/2024    HGB 12.7 04/11/2024    HCT 40.0 04/11/2024    MCV 86.2 04/11/2024     04/11/2024     Lab Results   Component Value Date    GLUCOSE 101 (H) 04/11/2024    BUN 10  2024    CREATININE 0.72 2024    EGFRRESULT 116 2024    EGFR 109.2 2024    BCR 13.9 2024    K 4.0 2024    CO2 19.2 (L) 2024    CALCIUM 9.4 2024    PROTENTOTREF 6.0 2024    ALBUMIN 3.5 2024    BILITOT 0.2 2024    AST 8 2024    ALT 13 2024     Treponemal Ab - NR   P/C urine - 525     Ultrasound today   Transverse presentation   Anterior-Fundal placenta   Growth AGA 58%, A/C 75%  BPP  (-2 no tone)   All dopplers elevated, Intermittent AEDF   PAC's noted fetal       Assessment & Plan   Assessment / Plan     Brief Patient Summary:  Fany Brunson is a 39 y.o. female  @ 32w2d   1) Abnormal dopplers - elevated with intermittent - absent end diastolic flow. Per [facility] getting steroids, work up to see if superimposed pre-eclampsia and repeat Monday with hope to go from there. This is despite not having IUGR - but her placenta could be significantly affected by several of her other conditions.   2) Chronic hypertension on significant medication with labetalol 300 mg TID and procardia XL 90 mg BID.  P/C is going up to suggest she does have super-imposed pre-eclampsia. 24 hour urine and steroids for FLM.  Monitor BP over time.   3) DM found in early pregnancy with elevated HgA1c. [facility] has been and continues to manage her blood sugar control.   4) AMA, normal NIPT   5) Grand multiparity with 6 prior C-Sections - Plan is to deliver by repeat      Addendum   Called by  who I asked to see patient.   She was concerned the patient did not seem to understand our concerns with her pregnancy and possible needs to deliver pre-term.      I returned this evening and had a conversation with her and her . I explained we are concerned with the idiopathic elevated umbilical artery dopplers and intermittent absent end diastolic flow.  I explained this is a concerning finding that her placenta is not functioning well and could wear out  well before term.  This may because she is developing super-imposed pre-eclampsia which also looks to be happening. We are testing for the pre-eclampsia, but would only be concerned by severe features which we have not seen a lot of yet, although some of her blood pressures do approach severe range.  Right now we want to get her steroids, assess her hypertension control, monitor fetal status closely and repeat the dopplers on Monday and see if we need to decide further from there, but she could need to stay until delivery, and we could recommend delivery in the next several weeks depending on some of these findings.     Active Hospital Problems:  Active Hospital Problems    Diagnosis     **Abnormal  ultrasound     Previous  delivery, antepartum     Multigravida of advanced maternal age in third trimester     Pre-existing diabetes mellitus during pregnancy in third trimester     Pre-existing essential hypertension during pregnancy in third trimester     Grand multiparity with current pregnancy in third trimester      Plan:   Steroids for FLM  Serial BP, 24 hour urine to confirm super-imposed pre-eclampsia   Repeating dopplers on Monday per [facility]  Deliver for deterioration in maternal fetal status   Attempted to get her to sign tubal papers, but unfortunately she currently does not want to proceed with sterilization.     DVT prophylaxis:  Mechanical DVT prophylaxis orders are present.        Salvador Carson MD  2024  17:16 EDT

## 2024-04-11 NOTE — LETTER
2024     Salvador Carson MD  [address]  [address]  [place] [id number]    Patient: Fany Brunson   YOB: 1985   Date of Visit: 2024       Dear Salvador Carson MD,    Thank you for referring Fany Brunson to me for evaluation. Below is a copy of my consult note.    If you have questions, please do not hesitate to call me. I look forward to following Fany along with you.         Sincerely,        DEAN Dill        CC: No Recipients                        MATERNAL FETAL MEDICINE Consult Note    Dear Dr Salvador Carson MD:    Thank you for your kind referral of Fany Brunson.  As you know, she is a 39 y.o. G 7 P 6 at 32  2/7 weeks gestation (Estimated Date of Delivery: 24). This is a consult.    Her antepartum course is complicated by:  AMA  CHTN  T2DM  H/O  x 6  UA Dopplers elevated and intermittent AEDF  Fetal PAC's    Aneuploidy Screening: MaterniT 21 - Negative    HPI: Today, she denies headache, blurry vision, RUQ pain. No vaginal bleeding, no contractions.     Review of History:  Past Medical History:   Diagnosis Date   • Acute appendicitis 2023    Formatting of this note might be different from the original.   Added automatically from request for surgery [id number]     • Anxiety    • Gestational diabetes    • Herpes     HSV #1 - cold sores   • Hypertension     chronic   • Oligohydramnios in third trimester 10/15/2015    Formatting of this note might be different from the original.   2015 IMO UPDATE     • Varicella      Past Surgical History:   Procedure Laterality Date   • APPENDECTOMY     • BREAST BIOPSY  10/2022   •  SECTION     •  SECTION Bilateral 2019    Procedure:  SECTION REPEAT;  Surgeon: Salvador Carson MD;  Location: [facility] LABOR DELIVERY;  Service: Obstetrics/Gynecology   • CHOLECYSTECTOMY     • CHOLECYSTECTOMY     • LAPAROSCOPIC CHOLECYSTECTOMY     • WRIST SURGERY        Social History     Socioeconomic History   • Marital status:    Tobacco Use   • Smoking status: Former     Types: Cigarettes   • Smokeless tobacco: Never   Vaping Use   • Vaping status: Never Used   Substance and Sexual Activity   • Alcohol use: No   • Drug use: No   • Sexual activity: Yes     Partners: Male     Family History   Problem Relation Age of Onset   • Stroke Father    • Breast cancer Paternal Grandmother         Passed at age 39 from breast cancer   • Ovarian cancer Neg Hx    • Uterine cancer Neg Hx    • Colon cancer Neg Hx    • Deep vein thrombosis Neg Hx    • Pulmonary embolism Neg Hx       Allergies   Allergen Reactions   • Latex Rash      Current Facility-Administered Medications on File Prior to Visit   Medication Dose Route Frequency Provider Last Rate Last Admin   • hydrALAZINE (APRESOLINE) injection 5-10 mg  5-10 mg Intravenous Q20 Min PRN Salvador Carson MD        Or   • labetalol (NORMODYNE,TRANDATE) injection 20-80 mg  20-80 mg Intravenous Q10 Min PRN Salvador Carson MD        Or   • NIFEdipine (PROCARDIA) capsule 10-20 mg  10-20 mg Oral Q20 Min PRN Salvador Carson MD       • insulin glargine (LANTUS, SEMGLEE) injection 29 Units  29 Units Subcutaneous Nightly Constance Zhao APRN       • [START ON 4/12/2024] insulin glargine (LANTUS, SEMGLEE) injection 35 Units  35 Units Subcutaneous QAM Constance Zhao APRN       • [START ON 4/12/2024] insulin lispro (HUMALOG/ADMELOG) injection 22 Units  22 Units Subcutaneous Daily With Breakfast Constance Zhao APRN       • insulin lispro (HUMALOG/ADMELOG) injection 22 Units  22 Units Subcutaneous Daily With Lunch Constance Zhao, DEAN       • insulin lispro (HUMALOG/ADMELOG) injection 22 Units  22 Units Subcutaneous Daily With Dinner Constance Zhao APRN       • labetalol (NORMODYNE) tablet 300 mg  300 mg Oral Q8H Salvador Carson MD       • lidocaine (XYLOCAINE) 1 % injection 0.5 mL  0.5 mL Intradermal  Once PRN Salvador Carson MD       • NIFEdipine XL (PROCARDIA XL) 24 hr tablet 90 mg  90 mg Oral Q12H Salvador Carson MD       • sodium chloride 0.9 % flush 10 mL  10 mL Intravenous Q12H Salvador Carson MD       • sodium chloride 0.9 % flush 10 mL  10 mL Intravenous PRN Salvador Carson MD       • sodium chloride 0.9 % infusion 40 mL  40 mL Intravenous PRN Salvador Carson MD         Current Outpatient Medications on File Prior to Visit   Medication Sig Dispense Refill   • aspirin 81 MG chewable tablet Chew 1 tablet Daily. 30 tablet 7   • Blood Glucose Monitoring Suppl (OneTouch Verio Flex System) w/Device kit      • glucose (B-D GLUCOSE) 5 g chewable tablet Chew 3 tablets As Needed for Low Blood Sugar. 50 tablet 6   • Glucose Blood (Blood Glucose Test) strip Please dispense test strips compatible with the One Touch glucometer. Currently testing blood sugar 7 times a day. 200 each 8   • glucose monitor monitoring kit Use 1 each 4 (Four) Times a Day. 1 each 0   • Insulin Glargine (LANTUS SOLOSTAR) 100 UNIT/ML injection pen Inject 35 Units under the skin into the appropriate area as directed Daily With Breakfast AND 27 Units every night at bedtime. I 100 mL 3   • Insulin Lispro, 1 Unit Dial, (HUMALOG) 100 UNIT/ML solution pen-injector Inject 22 Units under the skin into the appropriate area as directed 3 (Three) Times a Day Before Meals. 15 mL 11   • Insulin Pen Needle (Droplet Pen Needles) 32G X 4 MM misc USE 1 PEN NEEDLE FOUR TIMES A  each 1   • labetalol (NORMODYNE) 300 MG tablet Take 1 tablet by mouth 3 (Three) Times a Day. 60 tablet 5   • Lancets misc Use 1 each 4 (Four) Times a Day. Use to test fasting and 2 hours after meals 120 each 3   • NIFEdipine CC (ADALAT CC) 60 MG 24 hr tablet Take 1 tablet by mouth 2 (Two) Times a Day. (Patient taking differently: Take 90 mg by mouth 2 (Two) Times a Day. 90mg BID) 90 tablet 2   • nitrofurantoin, macrocrystal-monohydrate, (Macrobid) 100  "MG capsule Take 1 capsule by mouth Daily. 30 capsule 5   • Prenatal MV-Min-Fe Fum-FA-DHA (Prenatal Multivitamin + DHA) 28-0.8 & 200 MG misc      • hydrOXYzine pamoate (VISTARIL) 25 MG capsule Take 1 capsule by mouth 4 (Four) Times a Day As Needed for Anxiety. (Patient not taking: Reported on 2024) 120 capsule 4        Past obstetric, gynecological, medical, surgical, family and social history reviewed.  Relevant lab work and imaging reviewed.    Review of systems  Constitutional:  denies fever, chills, malaise.   ENT/Mouth:  denies sore throat, tinnitus  Eyes: denies vision changes/pain  CV:  denies chest pain  Respiratory:  denies cough/SOB  GI:  denies N/V, diarrhea, abdominal pain.    :   denies dysuria  Skin:  denies lesions or pruritus   Neuro:  denies weakness, focal neurologic symptoms    Vitals:    24 0900 24 1014   BP: 169/94 158/98   BP Location: Right arm Left arm   Patient Position: Sitting Sitting   Pulse: 82    Temp: 98.7 °F (37.1 °C)    TempSrc: Temporal    Weight: 122 kg (269 lb)    Height: 162.6 cm (64\")          PHYSICAL EXAM   GENERAL: Not in acute distress, AAOx3, pleasant  CARDIO: regular rate and rhythm  PULM: symmetric chest rise, speaking in complete sentences without difficulty  NEURO: awake, alert and oriented to person, place, and time  ABDOMINAL: No fundal tenderness, no rebound or guarding, gravid  EXTREMITIES: no bilateral lower extremity edema/tenderness  SKIN: Warm, well-perfused      ULTRASOUND   Please view full ultrasound note on Imaging tab in ViewPoint.  Transverse presentation.  Anterior fundal placenta.  SHANTANU 7 cm, which is low normal.  EFW 2113 g (58%, AC 75%)  BPP 6/8 ( no tone )  Intermittent AEDF (dopplers all elevated on ECHO incidentally, so followed up today).  Fetal arrhythmia noted, suspected PAC's    ASSESSMENT/COUNSELIN y.o. G 7 P 6 at 32  2/7 weeks gestation (Estimated Date of Delivery: 24)    -Pregnancy  [ X ] stable  [   ] improving [  " ] worsening    Diagnoses and all orders for this visit:    1. Advanced maternal age in multigravida, third trimester (Primary)    2. Grand multiparity with current pregnancy in third trimester    3. Chronic hypertension with exacerbation during pregnancy in third trimester  Overview:  Added automatically from request for surgery [id number]      4. Type 2 diabetes mellitus in pregnancy, third trimester    5. Fetal arrhythmia affecting pregnancy, antepartum         PREGESTATIONAL DM, TYPE 2  Previous Counseling: I explained to her that patients with pregestational diabetes have a variety of pregnancy related risks that are related to their level of glycemic control.  She reports she has not had a recent optho appointment.  She has not had an EKG.      We discussed at length the increased risks related to diabetes including but not limited to congenital anomalies, fetal growth disorders (FGR or LGA), indicated or spontaneous  birth, DKA, macrosomia, polyhydramnios, birth trauma,  complications (jaundice, hypoglycemia, NICU admit) and stillbirth with uncontrolled glucose.       We reviewed the results of her glycemic profiling, which reveals ok glycemic control with current insulin regimen.  She does not report issues with low blood sugar.     NEW REGIMEN  Lantus 35/29  Humalog     We discussed diet recommendations, and goal pre-prandial values below 90 and 1-hour post-prandial values below 120 to optimize her pregnancy outcome as many studies have demonstrated that this is the normal range for pregnant women without diabetes.     We also discussed preeclampsia risk reduction with baby aspirin, which she is taking daily.    CHRONIC HYPERTENSION  On labetalol 300 mg BID and Procardia XL 60 mg BID  Previously Counseled: We discussed the risks of chronic hypertension including intrauterine growth restriction, increased risk of preeclampsia, increased risk of premature delivery, and increased risk for  placental abruption.  I reassured her that with mild hypertension, no underlying cardiac disease, and normal renal function, most women do well in pregnancy.    I explained that acceptable blood pressures in pregnancies with chronic hypertension and without renal damage are 120-140/70-90s. Newer data from the [facility] (2022, CHAP TRIAL), supports more aggressive bp treatment to below 140/90 (previously ,160 in CHTN) and that this does not impair fetal growth or well-being but reduces risks of pre-eclampsia,  birth, and other pregnancy morbidity.  I discussed this with the patient.      I recommended serial growth ultrasounds every 4 weeks  to ensure appropriate fetal growth. In addition,  fetal testing 1-2x weekly should be initiated around 32 weeks gestation.  I would recommend a 38 week delivery given CHTN that has previously required medication.  She may require closer to 37 depending on her control closer to delivery.      ADVANCED MATERNAL AGE  Previously Counseled: The patient's age related risk for aneuploidy was reviewed. Noninvasive prenatal screening with cell-free fetal DNA (NIPT) results reviewed, which were normal.    Advanced maternal age has been associated with placental insufficiency with increased risk of fetal growth disorder and hypertensive disorders. Recommend fetal growth surveillance. Start  fetal surveillance with weekly BPP at 32-36 weeks.  Recommend baby ASA, which she is taking.      ABNORMAL UMBILICAL ARTERY DOPPLERS   We discussed umbilical artery doppler studies as 4 possible levels: normal, elevated, absent, and reversed.  We discussed that absent and reversed would require admission,  corticosteroids, and possibly delivery depending on gestational age and other testing.  Discussed with patient that most likely cause of this is placental insufficiency. Pt does have chronic hypertension and Type 2 Diabetes.     PAC's  A premature atrial contraction  (PAC) is an extra heartbeat that occurs in the heart's upper chambers (atria), disrupting the fetus' normal heart rhythm. Premature atrial contractions are the most common type of fetal cardiac arrhythmias and are almost always benign and resolve on their own. 1% of the time, babies with PAC's can progress and get sustained tachycardia that results in SVT and cardiac decompensation.          UPDATE 04/11/24  Her blood pressures in the office today are 150-160s/90s -- will send to [facility] for pre-eclampsia labs and eval. She denies headache/vision changes/ ruq pain etc.   Umbilical artery dopplers were noted on Fetal Echo to be elevated so they were re-evaluated today. Today UA dopplers were all elevated and intermittently absent end diastolic flow.   BPP 6/8 (-2 for no tone)  Fetal arrhythmia noted today, suspected PAC's.  Discussed with patient that [facility] recommendation is for her to go to L&D for further monitoring/pre-eclampsia labs/etc. Plan to be here through the weekend and can re-evaluate on Monday. Will repeat ultrasound on Monday to check UA dopplers again. Dr Carson and L&D charge nurse notified that patient going to L&D. Pt states understanding/denies questions.   Delivery timing will be determined based on results of testing.       RECOMMENDATIONS  -To L&D for admission  -Pre-eclampsia labs (CBC/CMP/Uric Acid/LDH/24 hr urine)  -Serial blood pressures  -Continue labetalol 300 mg PO BID  -Continue procardia XL 60 mg BID  - Will order EKG to be done in hospital since patient never had done outpatient  -Continue ASA 81 mg daily  -Continue ADA diet with carbohydrate counting.    -Continue glucose monitoring 7 times daily   - Lantus 35/29  - Humalog 22/22/22  - Repeat BPP with dopplers on Monday      Thank you for the consult and opportunity to care for this patient.  Please feel free to reach out with any questions or concerns.      I spent 45 minutes caring for this patient on this date of service. This time  includes time spent by me in the following activities: preparing for the visit, reviewing tests, obtaining and/or reviewing a separately obtained history, performing a medically appropriate examination and/or evaluation, counseling and educating the patient/family/caregiver and independently interpreting results and communicating that information with the patient/family/caregiver with greater than 50% spent in counseling and coordination of care.     DEAN Sweet  Maternal Fetal Medicine-[facility]  Office: [phone]  [email or web address]

## 2024-04-11 NOTE — PROGRESS NOTES
MATERNAL FETAL MEDICINE Consult Note    Dear Dr Salvador Carson MD:    Thank you for your kind referral of Fany Brunson.  As you know, she is a 39 y.o. G 7 P 6 at 32  2/7 weeks gestation (Estimated Date of Delivery: 24). This is a consult.    Her antepartum course is complicated by:  AMA  CHTN  T2DM  H/O  x 6  UA Dopplers elevated and intermittent AEDF  Fetal PAC's    Aneuploidy Screening: MaterniT 21 - Negative    HPI: Today, she denies headache, blurry vision, RUQ pain. No vaginal bleeding, no contractions.     Review of History:  Past Medical History:   Diagnosis Date    Acute appendicitis 2023    Formatting of this note might be different from the original.   Added automatically from request for surgery [id number]      Anxiety     Gestational diabetes     Herpes     HSV #1 - cold sores    Hypertension     chronic    Oligohydramnios in third trimester 10/15/2015    Formatting of this note might be different from the original.   2015 IMO UPDATE      Varicella      Past Surgical History:   Procedure Laterality Date    APPENDECTOMY      BREAST BIOPSY  10/2022     SECTION       SECTION Bilateral 2019    Procedure:  SECTION REPEAT;  Surgeon: Salvador Carson MD;  Location: [facility] LABOR DELIVERY;  Service: Obstetrics/Gynecology    CHOLECYSTECTOMY      CHOLECYSTECTOMY      LAPAROSCOPIC CHOLECYSTECTOMY      WRIST SURGERY       Social History     Socioeconomic History    Marital status:    Tobacco Use    Smoking status: Former     Types: Cigarettes    Smokeless tobacco: Never   Vaping Use    Vaping status: Never Used   Substance and Sexual Activity    Alcohol use: No    Drug use: No    Sexual activity: Yes     Partners: Male     Family History   Problem Relation Age of Onset    Stroke Father     Breast cancer Paternal Grandmother         Passed at age 39 from breast cancer    Ovarian cancer Neg Hx     Uterine cancer Neg Hx     Colon cancer Neg  Hx     Deep vein thrombosis Neg Hx     Pulmonary embolism Neg Hx       Allergies   Allergen Reactions    Latex Rash      Current Facility-Administered Medications on File Prior to Visit   Medication Dose Route Frequency Provider Last Rate Last Admin    hydrALAZINE (APRESOLINE) injection 5-10 mg  5-10 mg Intravenous Q20 Min PRN Salvador Carson MD        Or    labetalol (NORMODYNE,TRANDATE) injection 20-80 mg  20-80 mg Intravenous Q10 Min PRN Salvador Carson MD        Or    NIFEdipine (PROCARDIA) capsule 10-20 mg  10-20 mg Oral Q20 Min PRN Salvador Carson MD        insulin glargine (LANTUS, SEMGLEE) injection 29 Units  29 Units Subcutaneous Nightly Constance Zhao APRN        [START ON 4/12/2024] insulin glargine (LANTUS, SEMGLEE) injection 35 Units  35 Units Subcutaneous QAM Constance Zhao APRN        [START ON 4/12/2024] insulin lispro (HUMALOG/ADMELOG) injection 22 Units  22 Units Subcutaneous Daily With Breakfast Constance Zhao APRN        insulin lispro (HUMALOG/ADMELOG) injection 22 Units  22 Units Subcutaneous Daily With Lunch Constance Zhao APRN        insulin lispro (HUMALOG/ADMELOG) injection 22 Units  22 Units Subcutaneous Daily With Dinner Constance Zhao APRN        labetalol (NORMODYNE) tablet 300 mg  300 mg Oral Q8H Salvador Carson MD        lidocaine (XYLOCAINE) 1 % injection 0.5 mL  0.5 mL Intradermal Once PRN Salvador Carson MD        NIFEdipine XL (PROCARDIA XL) 24 hr tablet 90 mg  90 mg Oral Q12H Salvador Carson MD        sodium chloride 0.9 % flush 10 mL  10 mL Intravenous Q12H Salvador Carson MD        sodium chloride 0.9 % flush 10 mL  10 mL Intravenous PRN Salvador Carson MD        sodium chloride 0.9 % infusion 40 mL  40 mL Intravenous PRN Salvador Carson MD         Current Outpatient Medications on File Prior to Visit   Medication Sig Dispense Refill    aspirin 81 MG chewable tablet Chew 1 tablet Daily. 30 tablet 7     Blood Glucose Monitoring Suppl (OneTouch Verio Flex System) w/Device kit       glucose (B-D GLUCOSE) 5 g chewable tablet Chew 3 tablets As Needed for Low Blood Sugar. 50 tablet 6    Glucose Blood (Blood Glucose Test) strip Please dispense test strips compatible with the One Touch glucometer. Currently testing blood sugar 7 times a day. 200 each 8    glucose monitor monitoring kit Use 1 each 4 (Four) Times a Day. 1 each 0    Insulin Glargine (LANTUS SOLOSTAR) 100 UNIT/ML injection pen Inject 35 Units under the skin into the appropriate area as directed Daily With Breakfast AND 27 Units every night at bedtime. I 100 mL 3    Insulin Lispro, 1 Unit Dial, (HUMALOG) 100 UNIT/ML solution pen-injector Inject 22 Units under the skin into the appropriate area as directed 3 (Three) Times a Day Before Meals. 15 mL 11    Insulin Pen Needle (Droplet Pen Needles) 32G X 4 MM misc USE 1 PEN NEEDLE FOUR TIMES A  each 1    labetalol (NORMODYNE) 300 MG tablet Take 1 tablet by mouth 3 (Three) Times a Day. 60 tablet 5    Lancets misc Use 1 each 4 (Four) Times a Day. Use to test fasting and 2 hours after meals 120 each 3    NIFEdipine CC (ADALAT CC) 60 MG 24 hr tablet Take 1 tablet by mouth 2 (Two) Times a Day. (Patient taking differently: Take 90 mg by mouth 2 (Two) Times a Day. 90mg BID) 90 tablet 2    nitrofurantoin, macrocrystal-monohydrate, (Macrobid) 100 MG capsule Take 1 capsule by mouth Daily. 30 capsule 5    Prenatal MV-Min-Fe Fum-FA-DHA (Prenatal Multivitamin + DHA) 28-0.8 & 200 MG misc       hydrOXYzine pamoate (VISTARIL) 25 MG capsule Take 1 capsule by mouth 4 (Four) Times a Day As Needed for Anxiety. (Patient not taking: Reported on 4/11/2024) 120 capsule 4        Past obstetric, gynecological, medical, surgical, family and social history reviewed.  Relevant lab work and imaging reviewed.    Review of systems  Constitutional:  denies fever, chills, malaise.   ENT/Mouth:  denies sore throat, tinnitus  Eyes: denies  "vision changes/pain  CV:  denies chest pain  Respiratory:  denies cough/SOB  GI:  denies N/V, diarrhea, abdominal pain.    :   denies dysuria  Skin:  denies lesions or pruritus   Neuro:  denies weakness, focal neurologic symptoms    Vitals:    24 0900 24 1014   BP: 169/94 158/98   BP Location: Right arm Left arm   Patient Position: Sitting Sitting   Pulse: 82    Temp: 98.7 °F (37.1 °C)    TempSrc: Temporal    Weight: 122 kg (269 lb)    Height: 162.6 cm (64\")          PHYSICAL EXAM   GENERAL: Not in acute distress, AAOx3, pleasant  CARDIO: regular rate and rhythm  PULM: symmetric chest rise, speaking in complete sentences without difficulty  NEURO: awake, alert and oriented to person, place, and time  ABDOMINAL: No fundal tenderness, no rebound or guarding, gravid  EXTREMITIES: no bilateral lower extremity edema/tenderness  SKIN: Warm, well-perfused      ULTRASOUND   Please view full ultrasound note on Imaging tab in ViewPoint.  Transverse presentation.  Anterior fundal placenta.  SHANTANU 7 cm, which is low normal.  EFW 2113 g (58%, AC 75%)  BPP 6/8 ( no tone )  Intermittent AEDF (dopplers all elevated on ECHO incidentally, so followed up today).  Fetal arrhythmia noted, suspected PAC's    ASSESSMENT/COUNSELIN y.o. G 7 P 6 at 32  2/7 weeks gestation (Estimated Date of Delivery: 24)    -Pregnancy  [ X ] stable  [   ] improving [  ] worsening    Diagnoses and all orders for this visit:    1. Advanced maternal age in multigravida, third trimester (Primary)    2. Grand multiparity with current pregnancy in third trimester    3. Chronic hypertension with exacerbation during pregnancy in third trimester  Overview:  Added automatically from request for surgery [id number]      4. Type 2 diabetes mellitus in pregnancy, third trimester    5. Fetal arrhythmia affecting pregnancy, antepartum         PREGESTATIONAL DM, TYPE 2  Previous Counseling: I explained to her that patients with pregestational diabetes have " a variety of pregnancy related risks that are related to their level of glycemic control.  She reports she has not had a recent optho appointment.  She has not had an EKG.      We discussed at length the increased risks related to diabetes including but not limited to congenital anomalies, fetal growth disorders (FGR or LGA), indicated or spontaneous  birth, DKA, macrosomia, polyhydramnios, birth trauma,  complications (jaundice, hypoglycemia, NICU admit) and stillbirth with uncontrolled glucose.       We reviewed the results of her glycemic profiling, which reveals ok glycemic control with current insulin regimen.  She does not report issues with low blood sugar.     NEW REGIMEN  Lantus 35/29  Humalog     We discussed diet recommendations, and goal pre-prandial values below 90 and 1-hour post-prandial values below 120 to optimize her pregnancy outcome as many studies have demonstrated that this is the normal range for pregnant women without diabetes.     We also discussed preeclampsia risk reduction with baby aspirin, which she is taking daily.    CHRONIC HYPERTENSION  On labetalol 300 mg TID and Procardia XL 90 mg BID  Previously Counseled: We discussed the risks of chronic hypertension including intrauterine growth restriction, increased risk of preeclampsia, increased risk of premature delivery, and increased risk for placental abruption.  I reassured her that with mild hypertension, no underlying cardiac disease, and normal renal function, most women do well in pregnancy.    I explained that acceptable blood pressures in pregnancies with chronic hypertension and without renal damage are 120-140/70-90s. Newer data from the [facility] (2022, CHAP TRIAL), supports more aggressive bp treatment to below 140/90 (previously ,160 in CHTN) and that this does not impair fetal growth or well-being but reduces risks of pre-eclampsia,  birth, and other pregnancy morbidity.  I discussed this with  the patient.      I recommended serial growth ultrasounds every 4 weeks  to ensure appropriate fetal growth. In addition,  fetal testing 1-2x weekly should be initiated around 32 weeks gestation.  I would recommend a 38 week delivery given CHTN that has previously required medication.  She may require closer to 37 depending on her control closer to delivery.      ADVANCED MATERNAL AGE  Previously Counseled: The patient's age related risk for aneuploidy was reviewed. Noninvasive prenatal screening with cell-free fetal DNA (NIPT) results reviewed, which were normal.    Advanced maternal age has been associated with placental insufficiency with increased risk of fetal growth disorder and hypertensive disorders. Recommend fetal growth surveillance. Start  fetal surveillance with weekly BPP at 32-36 weeks.  Recommend baby ASA, which she is taking.      ABNORMAL UMBILICAL ARTERY DOPPLERS   We discussed umbilical artery doppler studies as 4 possible levels: normal, elevated, absent, and reversed.  We discussed that absent and reversed would require admission,  corticosteroids, and possibly delivery depending on gestational age and other testing.  Discussed with patient that most likely cause of this is placental insufficiency. Pt does have chronic hypertension and Type 2 Diabetes.     PAC's  A premature atrial contraction (PAC) is an extra heartbeat that occurs in the heart's upper chambers (atria), disrupting the fetus' normal heart rhythm. Premature atrial contractions are the most common type of fetal cardiac arrhythmias and are almost always benign and resolve on their own. 1% of the time, babies with PAC's can progress and get sustained tachycardia that results in SVT and cardiac decompensation.          UPDATE 24  Her blood pressures in the office today are 150-160s/90s -- will send to [facility] for pre-eclampsia labs and eval. She denies headache/vision changes/ ruq pain etc.   Umbilical  artery dopplers were noted on Fetal Echo to be elevated so they were re-evaluated today. Today UA dopplers were all elevated and intermittently absent end diastolic flow.   BPP 6/8 (-2 for no tone)  Fetal arrhythmia noted today, suspected PAC's.  Discussed with patient that [facility] recommendation is for her to go to L&D for further monitoring/pre-eclampsia labs/etc. Plan to be here through the weekend and can re-evaluate on Monday. Will repeat ultrasound on Monday to check UA dopplers again. Dr Carson and L&D charge nurse notified that patient going to L&D. Pt states understanding/denies questions.   Delivery timing will be determined based on results of testing.       RECOMMENDATIONS  -To L&D for admission  -Pre-eclampsia labs (CBC/CMP/Uric Acid/LDH/24 hr urine)  -Serial blood pressures  -Continue labetalol 300 mg PO BID  -Continue procardia XL 60 mg BID  - Will order EKG to be done in hospital since patient never had done outpatient  -Continue ASA 81 mg daily  -Continue ADA diet with carbohydrate counting.    -Continue glucose monitoring 7 times daily   - Lantus 35/29  - Humalog 22/22/22  - Repeat BPP with dopplers on Monday      Thank you for the consult and opportunity to care for this patient.  Please feel free to reach out with any questions or concerns.      I spent 45 minutes caring for this patient on this date of service. This time includes time spent by me in the following activities: preparing for the visit, reviewing tests, obtaining and/or reviewing a separately obtained history, performing a medically appropriate examination and/or evaluation, counseling and educating the patient/family/caregiver and independently interpreting results and communicating that information with the patient/family/caregiver with greater than 50% spent in counseling and coordination of care.     DEAN Sweet  Maternal Fetal Medicine-[facility]  Office: [phone]  [email or web address]

## 2024-04-11 NOTE — NON STRESS TEST
Fany Brunson, a  at 32w2d with an SINDI of 2024, by Last Menstrual Period, was seen at [address] for a nonstress test.    Chief Complaint   Patient presents with    Elevated Blood Pressure     Sent from [facility] office where was being seen for GHTN and GDM.  BP elevated in [facility] office       Patient Active Problem List   Diagnosis    High-risk pregnancy in third trimester    Gestational diabetes mellitus, class A2    Chronic hypertension with exacerbation during pregnancy in third trimester    S/P repeat low transverse     Pre-existing diabetes mellitus during pregnancy in third trimester    Pre-existing essential hypertension during pregnancy in third trimester    Grand multiparity with current pregnancy in third trimester    Diabetes mellitus    Hypertension    Hypertension affecting pregnancy    Previous  delivery, antepartum    Multigravida of advanced maternal age in third trimester    Abnormal  ultrasound       Start Time: 1733  Stop Time:     Interpretation A  Nonstress Test Interpretation A: Reactive

## 2024-04-11 NOTE — PLAN OF CARE
Goal Outcome Evaluation:  Plan of Care Reviewed With: patient        Progress: no change  Outcome Evaluation: Pt admitted to Antepartum for increased bp and blood glucose monitoring. 24 hour urine started, will be complete on 4/12/24 at 1630. Pt reports active fetal movement. Pt denies leaking of fluid, bleeding, and ctx. Pt visiting with spouse at this time.

## 2024-04-11 NOTE — NON STRESS TEST
Fany Brunson, a  at 32w2d with an SINDI of 2024, by Last Menstrual Period, was seen at [facility] LABOR DELIVERY for a nonstress test.    Chief Complaint   Patient presents with    Elevated Blood Pressure     Sent from [facility] office where was being seen for GHTN and GDM.  BP elevated in [facility] office       Patient Active Problem List   Diagnosis    High-risk pregnancy in third trimester    Gestational diabetes mellitus, class A2    Chronic hypertension with exacerbation during pregnancy in third trimester    H/O:  section    Pregnancy    S/P repeat low transverse     Pre-existing diabetes mellitus during pregnancy in second trimester    Pre-existing essential hypertension during pregnancy in second trimester    Grand multiparity with current pregnancy in second trimester    Diabetes mellitus    Hypertension    Hypertension affecting pregnancy       Start Time: 1205  Stop Time: 1324    Interpretation A  Nonstress Test Interpretation A: Reactive

## 2024-04-12 ENCOUNTER — ANESTHESIA (OUTPATIENT)
Dept: LABOR AND DELIVERY | Facility: HOSPITAL | Age: 39
End: 2024-04-12
Payer: MEDICARE

## 2024-04-12 ENCOUNTER — ANESTHESIA EVENT (OUTPATIENT)
Dept: LABOR AND DELIVERY | Facility: HOSPITAL | Age: 39
End: 2024-04-12
Payer: MEDICARE

## 2024-04-12 LAB
ALBUMIN SERPL-MCNC: 3.5 G/DL (ref 3.5–5.2)
ALBUMIN/GLOB SERPL: 1.1 G/DL
ALP SERPL-CCNC: 150 U/L (ref 39–117)
ALT SERPL W P-5'-P-CCNC: 14 U/L (ref 1–33)
ANION GAP SERPL CALCULATED.3IONS-SCNC: 14.7 MMOL/L (ref 5–15)
AST SERPL-CCNC: 13 U/L (ref 1–32)
ATMOSPHERIC PRESS: 745.2 MMHG
ATMOSPHERIC PRESS: 746.5 MMHG
BASE EXCESS BLDCOA CALC-SCNC: -4.7 MMOL/L (ref -2–2)
BASE EXCESS BLDCOV CALC-SCNC: -4 MMOL/L (ref -30–30)
BASOPHILS # BLD AUTO: 0.03 10*3/MM3 (ref 0–0.2)
BASOPHILS NFR BLD AUTO: 0.2 % (ref 0–1.5)
BDY SITE: ABNORMAL
BDY SITE: ABNORMAL
BILIRUB SERPL-MCNC: <0.2 MG/DL (ref 0–1.2)
BUN SERPL-MCNC: 14 MG/DL (ref 6–20)
BUN/CREAT SERPL: 21.9 (ref 7–25)
CALCIUM SPEC-SCNC: 8.9 MG/DL (ref 8.6–10.5)
CHLORIDE SERPL-SCNC: 107 MMOL/L (ref 98–107)
CO2 BLDA-SCNC: 23.2 MMOL/L (ref 23–27)
CO2 BLDA-SCNC: 26.8 MMOL/L (ref 23–27)
CO2 SERPL-SCNC: 14.3 MMOL/L (ref 22–29)
CREAT SERPL-MCNC: 0.64 MG/DL (ref 0.57–1)
DEPRECATED RDW RBC AUTO: 44.3 FL (ref 37–54)
DEVICE COMMENT: ABNORMAL
DEVICE COMMENT: ABNORMAL
EGFRCR SERPLBLD CKD-EPI 2021: 115.5 ML/MIN/1.73
EOSINOPHIL # BLD AUTO: 0 10*3/MM3 (ref 0–0.4)
EOSINOPHIL NFR BLD AUTO: 0 % (ref 0.3–6.2)
ERYTHROCYTE [DISTWIDTH] IN BLOOD BY AUTOMATED COUNT: 14 % (ref 12.3–15.4)
GAS FLOW AIRWAY: 3 LPM
GAS FLOW AIRWAY: 3 LPM
GLOBULIN UR ELPH-MCNC: 3.2 GM/DL
GLUCOSE SERPL-MCNC: 122 MG/DL (ref 65–99)
HCO3 BLDCOA-SCNC: 21.8 MMOL/L (ref 22–28)
HCO3 BLDCOV-SCNC: 25 MMOL/L
HCT VFR BLD AUTO: 41 % (ref 34–46.6)
HGB BLD-MCNC: 13.8 G/DL (ref 12–15.9)
IMM GRANULOCYTES # BLD AUTO: 0.16 10*3/MM3 (ref 0–0.05)
IMM GRANULOCYTES NFR BLD AUTO: 1.3 % (ref 0–0.5)
LDH SERPL-CCNC: 139 U/L (ref 135–214)
LYMPHOCYTES # BLD AUTO: 2.32 10*3/MM3 (ref 0.7–3.1)
LYMPHOCYTES NFR BLD AUTO: 18.2 % (ref 19.6–45.3)
MCH RBC QN AUTO: 29 PG (ref 26.6–33)
MCHC RBC AUTO-ENTMCNC: 33.7 G/DL (ref 31.5–35.7)
MCV RBC AUTO: 86.1 FL (ref 79–97)
MODALITY: ABNORMAL
MODALITY: ABNORMAL
MONOCYTES # BLD AUTO: 0.27 10*3/MM3 (ref 0.1–0.9)
MONOCYTES NFR BLD AUTO: 2.1 % (ref 5–12)
NEUTROPHILS NFR BLD AUTO: 10 10*3/MM3 (ref 1.7–7)
NEUTROPHILS NFR BLD AUTO: 78.2 % (ref 42.7–76)
NRBC BLD AUTO-RTO: 0 /100 WBC (ref 0–0.2)
PCO2 BLDCOA: 44.1 MMHG (ref 43–63)
PCO2 BLDCOV: 59.4 MM HG (ref 35–51.3)
PH BLDCOA: 7.3 PH UNITS (ref 7.18–7.34)
PH BLDCOV: 7.23 PH UNITS (ref 7.26–7.4)
PLATELET # BLD AUTO: 308 10*3/MM3 (ref 140–450)
PMV BLD AUTO: 11.2 FL (ref 6–12)
PO2 BLDCOA: 19.5 MMHG (ref 12–26)
PO2 BLDCOV: <18.1 MM HG (ref 19–39)
POTASSIUM SERPL-SCNC: 3.8 MMOL/L (ref 3.5–5.2)
PROT SERPL-MCNC: 6.7 G/DL (ref 6–8.5)
QT INTERVAL: 390 MS
QTC INTERVAL: 493 MS
RBC # BLD AUTO: 4.76 10*6/MM3 (ref 3.77–5.28)
SAO2 % BLDCOA: 25.8 %
SAO2 % BLDCOV: ABNORMAL %
SODIUM SERPL-SCNC: 136 MMOL/L (ref 136–145)
URATE SERPL-MCNC: 4.8 MG/DL (ref 2.4–5.7)
WBC NRBC COR # BLD AUTO: 12.78 10*3/MM3 (ref 3.4–10.8)

## 2024-04-12 PROCEDURE — 25010000002 BUPIVACAINE PF 0.75 % SOLUTION: Performed by: ANESTHESIOLOGY

## 2024-04-12 PROCEDURE — 59514 CESAREAN DELIVERY ONLY: CPT | Performed by: OBSTETRICS & GYNECOLOGY

## 2024-04-12 PROCEDURE — 88307 TISSUE EXAM BY PATHOLOGIST: CPT

## 2024-04-12 PROCEDURE — 25010000002 FENTANYL CITRATE (PF) 50 MCG/ML SOLUTION: Performed by: ANESTHESIOLOGY

## 2024-04-12 PROCEDURE — 25010000002 LABETALOL 5 MG/ML SOLUTION: Performed by: OBSTETRICS & GYNECOLOGY

## 2024-04-12 PROCEDURE — 25010000002 DIPHENHYDRAMINE PER 50 MG: Performed by: NURSE ANESTHETIST, CERTIFIED REGISTERED

## 2024-04-12 PROCEDURE — 25010000002 PHENYLEPHRINE 10 MG/ML SOLUTION: Performed by: NURSE ANESTHETIST, CERTIFIED REGISTERED

## 2024-04-12 PROCEDURE — 25010000002 ONDANSETRON PER 1 MG: Performed by: ANESTHESIOLOGY

## 2024-04-12 PROCEDURE — 25010000002 ONDANSETRON PER 1 MG: Performed by: NURSE ANESTHETIST, CERTIFIED REGISTERED

## 2024-04-12 PROCEDURE — 25010000002 CEFAZOLIN PER 500 MG: Performed by: OBSTETRICS & GYNECOLOGY

## 2024-04-12 PROCEDURE — 83615 LACTATE (LD) (LDH) ENZYME: CPT | Performed by: OBSTETRICS & GYNECOLOGY

## 2024-04-12 PROCEDURE — 84550 ASSAY OF BLOOD/URIC ACID: CPT | Performed by: OBSTETRICS & GYNECOLOGY

## 2024-04-12 PROCEDURE — 82803 BLOOD GASES ANY COMBINATION: CPT | Performed by: OBSTETRICS & GYNECOLOGY

## 2024-04-12 PROCEDURE — 25810000003 LACTATED RINGERS PER 1000 ML: Performed by: OBSTETRICS & GYNECOLOGY

## 2024-04-12 PROCEDURE — 25010000002 KETOROLAC TROMETHAMINE PER 15 MG: Performed by: NURSE ANESTHETIST, CERTIFIED REGISTERED

## 2024-04-12 PROCEDURE — 93010 ELECTROCARDIOGRAM REPORT: CPT | Performed by: INTERNAL MEDICINE

## 2024-04-12 PROCEDURE — 59510 CESAREAN DELIVERY: CPT | Performed by: OBSTETRICS & GYNECOLOGY

## 2024-04-12 PROCEDURE — 25010000002 BETAMETHASONE ACET & SOD PHOS PER 4 MG: Performed by: OBSTETRICS & GYNECOLOGY

## 2024-04-12 PROCEDURE — 99231 SBSQ HOSP IP/OBS SF/LOW 25: CPT | Performed by: OBSTETRICS & GYNECOLOGY

## 2024-04-12 PROCEDURE — 80053 COMPREHEN METABOLIC PANEL: CPT | Performed by: OBSTETRICS & GYNECOLOGY

## 2024-04-12 PROCEDURE — 25810000003 LACTATED RINGERS SOLUTION: Performed by: OBSTETRICS & GYNECOLOGY

## 2024-04-12 PROCEDURE — 25010000002 KETOROLAC TROMETHAMINE PER 15 MG: Performed by: OBSTETRICS & GYNECOLOGY

## 2024-04-12 PROCEDURE — 25010000002 HYDRALAZINE PER 20 MG: Performed by: OBSTETRICS & GYNECOLOGY

## 2024-04-12 PROCEDURE — 25010000002 PROPOFOL 10 MG/ML EMULSION: Performed by: NURSE ANESTHETIST, CERTIFIED REGISTERED

## 2024-04-12 PROCEDURE — 93005 ELECTROCARDIOGRAM TRACING: CPT | Performed by: NURSE PRACTITIONER

## 2024-04-12 PROCEDURE — 25010000002 MORPHINE PER 10 MG: Performed by: ANESTHESIOLOGY

## 2024-04-12 PROCEDURE — 85025 COMPLETE CBC W/AUTO DIFF WBC: CPT | Performed by: OBSTETRICS & GYNECOLOGY

## 2024-04-12 RX ORDER — SODIUM CHLORIDE 0.9 % (FLUSH) 0.9 %
10 SYRINGE (ML) INJECTION AS NEEDED
Status: DISCONTINUED | OUTPATIENT
Start: 2024-04-12 | End: 2024-04-12

## 2024-04-12 RX ORDER — CITRIC ACID/SODIUM CITRATE 334-500MG
30 SOLUTION, ORAL ORAL ONCE
Status: COMPLETED | OUTPATIENT
Start: 2024-04-12 | End: 2024-04-12

## 2024-04-12 RX ORDER — ONDANSETRON 2 MG/ML
4 INJECTION INTRAMUSCULAR; INTRAVENOUS ONCE AS NEEDED
Status: COMPLETED | OUTPATIENT
Start: 2024-04-12 | End: 2024-04-12

## 2024-04-12 RX ORDER — SIMETHICONE 80 MG
80 TABLET,CHEWABLE ORAL 4 TIMES DAILY PRN
Status: DISCONTINUED | OUTPATIENT
Start: 2024-04-12 | End: 2024-04-14 | Stop reason: HOSPADM

## 2024-04-12 RX ORDER — FAMOTIDINE 10 MG/ML
20 INJECTION, SOLUTION INTRAVENOUS ONCE AS NEEDED
Status: COMPLETED | OUTPATIENT
Start: 2024-04-12 | End: 2024-04-12

## 2024-04-12 RX ORDER — BUPIVACAINE HYDROCHLORIDE 7.5 MG/ML
INJECTION, SOLUTION EPIDURAL; RETROBULBAR
Status: COMPLETED | OUTPATIENT
Start: 2024-04-12 | End: 2024-04-12

## 2024-04-12 RX ORDER — HYDROXYZINE 50 MG/1
50 TABLET, FILM COATED ORAL EVERY 6 HOURS PRN
Status: DISCONTINUED | OUTPATIENT
Start: 2024-04-12 | End: 2024-04-14 | Stop reason: HOSPADM

## 2024-04-12 RX ORDER — MORPHINE SULFATE 2 MG/ML
2 INJECTION, SOLUTION INTRAMUSCULAR; INTRAVENOUS
Status: ACTIVE | OUTPATIENT
Start: 2024-04-12 | End: 2024-04-12

## 2024-04-12 RX ORDER — DOCUSATE SODIUM 100 MG/1
100 CAPSULE, LIQUID FILLED ORAL 2 TIMES DAILY PRN
Status: DISCONTINUED | OUTPATIENT
Start: 2024-04-12 | End: 2024-04-14 | Stop reason: HOSPADM

## 2024-04-12 RX ORDER — ACETAMINOPHEN 325 MG/1
650 TABLET ORAL EVERY 6 HOURS
Status: DISCONTINUED | OUTPATIENT
Start: 2024-04-13 | End: 2024-04-14 | Stop reason: HOSPADM

## 2024-04-12 RX ORDER — OXYTOCIN/0.9 % SODIUM CHLORIDE 30/500 ML
999 PLASTIC BAG, INJECTION (ML) INTRAVENOUS ONCE
Status: COMPLETED | OUTPATIENT
Start: 2024-04-12 | End: 2024-04-12

## 2024-04-12 RX ORDER — KETOROLAC TROMETHAMINE 15 MG/ML
15 INJECTION, SOLUTION INTRAMUSCULAR; INTRAVENOUS EVERY 6 HOURS
Status: DISPENSED | OUTPATIENT
Start: 2024-04-12 | End: 2024-04-13

## 2024-04-12 RX ORDER — PROMETHAZINE HYDROCHLORIDE 25 MG/1
25 TABLET ORAL EVERY 6 HOURS PRN
Status: DISCONTINUED | OUTPATIENT
Start: 2024-04-12 | End: 2024-04-14 | Stop reason: HOSPADM

## 2024-04-12 RX ORDER — OXYTOCIN/0.9 % SODIUM CHLORIDE 30/500 ML
250 PLASTIC BAG, INJECTION (ML) INTRAVENOUS CONTINUOUS
Status: DISPENSED | OUTPATIENT
Start: 2024-04-12 | End: 2024-04-12

## 2024-04-12 RX ORDER — ONDANSETRON 2 MG/ML
4 INJECTION INTRAMUSCULAR; INTRAVENOUS EVERY 6 HOURS PRN
Status: DISCONTINUED | OUTPATIENT
Start: 2024-04-12 | End: 2024-04-14 | Stop reason: HOSPADM

## 2024-04-12 RX ORDER — PHENYLEPHRINE HYDROCHLORIDE 10 MG/ML
INJECTION INTRAVENOUS AS NEEDED
Status: DISCONTINUED | OUTPATIENT
Start: 2024-04-12 | End: 2024-04-12 | Stop reason: SURG

## 2024-04-12 RX ORDER — ENOXAPARIN SODIUM 100 MG/ML
40 INJECTION SUBCUTANEOUS EVERY 12 HOURS
Status: DISCONTINUED | OUTPATIENT
Start: 2024-04-13 | End: 2024-04-14 | Stop reason: HOSPADM

## 2024-04-12 RX ORDER — SODIUM CHLORIDE, SODIUM LACTATE, POTASSIUM CHLORIDE, CALCIUM CHLORIDE 600; 310; 30; 20 MG/100ML; MG/100ML; MG/100ML; MG/100ML
75 INJECTION, SOLUTION INTRAVENOUS CONTINUOUS
Status: DISCONTINUED | OUTPATIENT
Start: 2024-04-12 | End: 2024-04-14 | Stop reason: HOSPADM

## 2024-04-12 RX ORDER — METFORMIN HYDROCHLORIDE 500 MG/1
500 TABLET, EXTENDED RELEASE ORAL 2 TIMES DAILY WITH MEALS
Status: DISCONTINUED | OUTPATIENT
Start: 2024-04-12 | End: 2024-04-14 | Stop reason: HOSPADM

## 2024-04-12 RX ORDER — PROMETHAZINE HYDROCHLORIDE 12.5 MG/1
12.5 SUPPOSITORY RECTAL EVERY 6 HOURS PRN
Status: DISCONTINUED | OUTPATIENT
Start: 2024-04-12 | End: 2024-04-14 | Stop reason: HOSPADM

## 2024-04-12 RX ORDER — NITROFURANTOIN 25; 75 MG/1; MG/1
100 CAPSULE ORAL DAILY
Status: DISCONTINUED | OUTPATIENT
Start: 2024-04-12 | End: 2024-04-14 | Stop reason: HOSPADM

## 2024-04-12 RX ORDER — SODIUM CHLORIDE, SODIUM LACTATE, POTASSIUM CHLORIDE, CALCIUM CHLORIDE 600; 310; 30; 20 MG/100ML; MG/100ML; MG/100ML; MG/100ML
125 INJECTION, SOLUTION INTRAVENOUS CONTINUOUS
Status: DISCONTINUED | OUTPATIENT
Start: 2024-04-12 | End: 2024-04-12

## 2024-04-12 RX ORDER — IBUPROFEN 600 MG/1
600 TABLET ORAL EVERY 6 HOURS
Status: DISCONTINUED | OUTPATIENT
Start: 2024-04-13 | End: 2024-04-14 | Stop reason: HOSPADM

## 2024-04-12 RX ORDER — NALOXONE HCL 0.4 MG/ML
0.2 VIAL (ML) INJECTION
Status: DISCONTINUED | OUTPATIENT
Start: 2024-04-12 | End: 2024-04-14 | Stop reason: HOSPADM

## 2024-04-12 RX ORDER — HYDROXYZINE 50 MG/1
50 TABLET, FILM COATED ORAL NIGHTLY PRN
Status: DISCONTINUED | OUTPATIENT
Start: 2024-04-12 | End: 2024-04-14 | Stop reason: HOSPADM

## 2024-04-12 RX ORDER — HYDROMORPHONE HYDROCHLORIDE 1 MG/ML
0.5 INJECTION, SOLUTION INTRAMUSCULAR; INTRAVENOUS; SUBCUTANEOUS
Status: DISCONTINUED | OUTPATIENT
Start: 2024-04-12 | End: 2024-04-12 | Stop reason: HOSPADM

## 2024-04-12 RX ORDER — DIPHENHYDRAMINE HCL 25 MG
25 CAPSULE ORAL EVERY 4 HOURS PRN
Status: DISCONTINUED | OUTPATIENT
Start: 2024-04-12 | End: 2024-04-14 | Stop reason: HOSPADM

## 2024-04-12 RX ORDER — OXYCODONE HYDROCHLORIDE 10 MG/1
10 TABLET ORAL EVERY 4 HOURS PRN
Status: DISCONTINUED | OUTPATIENT
Start: 2024-04-12 | End: 2024-04-14 | Stop reason: HOSPADM

## 2024-04-12 RX ORDER — PROPOFOL 10 MG/ML
VIAL (ML) INTRAVENOUS AS NEEDED
Status: DISCONTINUED | OUTPATIENT
Start: 2024-04-12 | End: 2024-04-12 | Stop reason: SURG

## 2024-04-12 RX ORDER — ACETAMINOPHEN 500 MG
1000 TABLET ORAL EVERY 6 HOURS
Status: COMPLETED | OUTPATIENT
Start: 2024-04-12 | End: 2024-04-13

## 2024-04-12 RX ORDER — KETOROLAC TROMETHAMINE 30 MG/ML
INJECTION, SOLUTION INTRAMUSCULAR; INTRAVENOUS AS NEEDED
Status: DISCONTINUED | OUTPATIENT
Start: 2024-04-12 | End: 2024-04-12 | Stop reason: SURG

## 2024-04-12 RX ORDER — DIPHENHYDRAMINE HYDROCHLORIDE 50 MG/ML
25 INJECTION INTRAMUSCULAR; INTRAVENOUS EVERY 4 HOURS PRN
Status: DISCONTINUED | OUTPATIENT
Start: 2024-04-12 | End: 2024-04-14 | Stop reason: HOSPADM

## 2024-04-12 RX ORDER — CALCIUM CARBONATE 500 MG/1
1 TABLET, CHEWABLE ORAL EVERY 4 HOURS PRN
Status: DISCONTINUED | OUTPATIENT
Start: 2024-04-12 | End: 2024-04-14 | Stop reason: HOSPADM

## 2024-04-12 RX ORDER — CARBOPROST TROMETHAMINE 250 UG/ML
250 INJECTION, SOLUTION INTRAMUSCULAR AS NEEDED
Status: DISCONTINUED | OUTPATIENT
Start: 2024-04-12 | End: 2024-04-12

## 2024-04-12 RX ORDER — DROPERIDOL 2.5 MG/ML
0.62 INJECTION, SOLUTION INTRAMUSCULAR; INTRAVENOUS
Status: DISCONTINUED | OUTPATIENT
Start: 2024-04-12 | End: 2024-04-14 | Stop reason: HOSPADM

## 2024-04-12 RX ORDER — SODIUM CHLORIDE 9 MG/ML
40 INJECTION, SOLUTION INTRAVENOUS AS NEEDED
Status: DISCONTINUED | OUTPATIENT
Start: 2024-04-12 | End: 2024-04-12

## 2024-04-12 RX ORDER — LABETALOL HYDROCHLORIDE 5 MG/ML
80 INJECTION, SOLUTION INTRAVENOUS ONCE
Status: DISCONTINUED | OUTPATIENT
Start: 2024-04-12 | End: 2024-04-14 | Stop reason: HOSPADM

## 2024-04-12 RX ORDER — SODIUM CHLORIDE 0.9 % (FLUSH) 0.9 %
10 SYRINGE (ML) INJECTION EVERY 12 HOURS SCHEDULED
Status: DISCONTINUED | OUTPATIENT
Start: 2024-04-12 | End: 2024-04-12

## 2024-04-12 RX ORDER — PRENATAL VIT/IRON FUM/FOLIC AC 27MG-0.8MG
1 TABLET ORAL DAILY
Status: DISCONTINUED | OUTPATIENT
Start: 2024-04-12 | End: 2024-04-14 | Stop reason: HOSPADM

## 2024-04-12 RX ORDER — MISOPROSTOL 200 UG/1
800 TABLET ORAL AS NEEDED
Status: DISCONTINUED | OUTPATIENT
Start: 2024-04-12 | End: 2024-04-12

## 2024-04-12 RX ORDER — BETAMETHASONE SODIUM PHOSPHATE AND BETAMETHASONE ACETATE 3; 3 MG/ML; MG/ML
12 INJECTION, SUSPENSION INTRA-ARTICULAR; INTRALESIONAL; INTRAMUSCULAR; SOFT TISSUE EVERY 12 HOURS
Status: DISCONTINUED | OUTPATIENT
Start: 2024-04-12 | End: 2024-04-12

## 2024-04-12 RX ORDER — MORPHINE SULFATE 4 MG/ML
INJECTION, SOLUTION INTRAMUSCULAR; INTRAVENOUS
Status: COMPLETED | OUTPATIENT
Start: 2024-04-12 | End: 2024-04-12

## 2024-04-12 RX ORDER — OXYCODONE HYDROCHLORIDE 5 MG/1
5 TABLET ORAL EVERY 4 HOURS PRN
Status: DISCONTINUED | OUTPATIENT
Start: 2024-04-12 | End: 2024-04-14 | Stop reason: HOSPADM

## 2024-04-12 RX ORDER — KETOROLAC TROMETHAMINE 30 MG/ML
30 INJECTION, SOLUTION INTRAMUSCULAR; INTRAVENOUS ONCE
Status: DISCONTINUED | OUTPATIENT
Start: 2024-04-12 | End: 2024-04-12

## 2024-04-12 RX ORDER — METHYLERGONOVINE MALEATE 0.2 MG/ML
200 INJECTION INTRAVENOUS ONCE AS NEEDED
Status: DISCONTINUED | OUTPATIENT
Start: 2024-04-12 | End: 2024-04-12 | Stop reason: HOSPADM

## 2024-04-12 RX ORDER — MAGNESIUM SULFATE HEPTAHYDRATE 40 MG/ML
2 INJECTION, SOLUTION INTRAVENOUS CONTINUOUS
Status: DISCONTINUED | OUTPATIENT
Start: 2024-04-12 | End: 2024-04-14 | Stop reason: HOSPADM

## 2024-04-12 RX ORDER — ERYTHROMYCIN 5 MG/G
OINTMENT OPHTHALMIC
Status: ACTIVE
Start: 2024-04-12 | End: 2024-04-12

## 2024-04-12 RX ORDER — HYDROCORTISONE 25 MG/G
CREAM TOPICAL 3 TIMES DAILY PRN
Status: DISCONTINUED | OUTPATIENT
Start: 2024-04-12 | End: 2024-04-14 | Stop reason: HOSPADM

## 2024-04-12 RX ORDER — LIDOCAINE HYDROCHLORIDE 10 MG/ML
0.5 INJECTION, SOLUTION INFILTRATION; PERINEURAL ONCE AS NEEDED
Status: DISCONTINUED | OUTPATIENT
Start: 2024-04-12 | End: 2024-04-12

## 2024-04-12 RX ORDER — PHYTONADIONE 1 MG/.5ML
INJECTION, EMULSION INTRAMUSCULAR; INTRAVENOUS; SUBCUTANEOUS
Status: ACTIVE
Start: 2024-04-12 | End: 2024-04-12

## 2024-04-12 RX ORDER — ALUMINA, MAGNESIA, AND SIMETHICONE 2400; 2400; 240 MG/30ML; MG/30ML; MG/30ML
15 SUSPENSION ORAL EVERY 4 HOURS PRN
Status: DISCONTINUED | OUTPATIENT
Start: 2024-04-12 | End: 2024-04-14 | Stop reason: HOSPADM

## 2024-04-12 RX ORDER — ACETAMINOPHEN 500 MG
1000 TABLET ORAL ONCE
Status: COMPLETED | OUTPATIENT
Start: 2024-04-12 | End: 2024-04-12

## 2024-04-12 RX ORDER — TRANEXAMIC ACID 10 MG/ML
1000 INJECTION, SOLUTION INTRAVENOUS ONCE
Status: DISCONTINUED | OUTPATIENT
Start: 2024-04-12 | End: 2024-04-14 | Stop reason: HOSPADM

## 2024-04-12 RX ORDER — FENTANYL CITRATE 50 UG/ML
INJECTION, SOLUTION INTRAMUSCULAR; INTRAVENOUS
Status: COMPLETED | OUTPATIENT
Start: 2024-04-12 | End: 2024-04-12

## 2024-04-12 RX ORDER — OXYTOCIN/0.9 % SODIUM CHLORIDE 30/500 ML
125 PLASTIC BAG, INJECTION (ML) INTRAVENOUS CONTINUOUS PRN
Status: DISCONTINUED | OUTPATIENT
Start: 2024-04-12 | End: 2024-04-14 | Stop reason: HOSPADM

## 2024-04-12 RX ADMIN — SODIUM CHLORIDE, POTASSIUM CHLORIDE, SODIUM LACTATE AND CALCIUM CHLORIDE 75 ML/HR: 600; 310; 30; 20 INJECTION, SOLUTION INTRAVENOUS at 03:31

## 2024-04-12 RX ADMIN — SODIUM CHLORIDE, POTASSIUM CHLORIDE, SODIUM LACTATE AND CALCIUM CHLORIDE: 600; 310; 30; 20 INJECTION, SOLUTION INTRAVENOUS at 11:27

## 2024-04-12 RX ADMIN — SODIUM CHLORIDE, POTASSIUM CHLORIDE, SODIUM LACTATE AND CALCIUM CHLORIDE 1000 ML: 600; 310; 30; 20 INJECTION, SOLUTION INTRAVENOUS at 09:05

## 2024-04-12 RX ADMIN — LABETALOL HYDROCHLORIDE 300 MG: 100 TABLET, FILM COATED ORAL at 17:30

## 2024-04-12 RX ADMIN — LABETALOL HYDROCHLORIDE 20 MG: 5 INJECTION, SOLUTION INTRAVENOUS at 02:01

## 2024-04-12 RX ADMIN — PHENYLEPHRINE HYDROCHLORIDE 100 MCG: 10 INJECTION INTRAVENOUS at 10:47

## 2024-04-12 RX ADMIN — LABETALOL HYDROCHLORIDE 40 MG: 5 INJECTION, SOLUTION INTRAVENOUS at 02:14

## 2024-04-12 RX ADMIN — KETOROLAC TROMETHAMINE 30 MG: 30 INJECTION, SOLUTION INTRAMUSCULAR at 11:20

## 2024-04-12 RX ADMIN — PHENYLEPHRINE HYDROCHLORIDE 150 MCG: 10 INJECTION INTRAVENOUS at 11:06

## 2024-04-12 RX ADMIN — ACETAMINOPHEN 1000 MG: 500 TABLET ORAL at 20:34

## 2024-04-12 RX ADMIN — ONDANSETRON 4 MG: 2 INJECTION INTRAMUSCULAR; INTRAVENOUS at 10:14

## 2024-04-12 RX ADMIN — NITROFURANTOIN MONOHYDRATE/MACROCRYSTALLINE 100 MG: 25; 75 CAPSULE ORAL at 17:30

## 2024-04-12 RX ADMIN — Medication 1 TABLET: at 16:25

## 2024-04-12 RX ADMIN — Medication 125 ML/HR: at 12:51

## 2024-04-12 RX ADMIN — PROPOFOL 20 MG: 10 INJECTION, EMULSION INTRAVENOUS at 10:55

## 2024-04-12 RX ADMIN — BETAMETHASONE SODIUM PHOSPHATE AND BETAMETHASONE ACETATE 12 MG: 3; 3 INJECTION, SUSPENSION INTRA-ARTICULAR; INTRALESIONAL; INTRAMUSCULAR at 06:01

## 2024-04-12 RX ADMIN — SODIUM CHLORIDE, POTASSIUM CHLORIDE, SODIUM LACTATE AND CALCIUM CHLORIDE 125 ML/HR: 600; 310; 30; 20 INJECTION, SOLUTION INTRAVENOUS at 10:21

## 2024-04-12 RX ADMIN — DIPHENHYDRAMINE HYDROCHLORIDE 25 MG: 50 INJECTION, SOLUTION INTRAMUSCULAR; INTRAVENOUS at 13:06

## 2024-04-12 RX ADMIN — ACETAMINOPHEN 1000 MG: 500 TABLET ORAL at 16:25

## 2024-04-12 RX ADMIN — SODIUM CHLORIDE 2000 MG: 900 INJECTION INTRAVENOUS at 10:41

## 2024-04-12 RX ADMIN — FAMOTIDINE 20 MG: 10 INJECTION INTRAVENOUS at 10:14

## 2024-04-12 RX ADMIN — PHENYLEPHRINE HYDROCHLORIDE 150 MCG: 10 INJECTION INTRAVENOUS at 10:45

## 2024-04-12 RX ADMIN — BUPIVACAINE HYDROCHLORIDE 1.6 ML: 7.5 INJECTION, SOLUTION EPIDURAL; RETROBULBAR at 10:40

## 2024-04-12 RX ADMIN — KETOROLAC TROMETHAMINE 15 MG: 15 INJECTION, SOLUTION INTRAMUSCULAR; INTRAVENOUS at 18:18

## 2024-04-12 RX ADMIN — LABETALOL HYDROCHLORIDE 300 MG: 100 TABLET, FILM COATED ORAL at 01:23

## 2024-04-12 RX ADMIN — LABETALOL HYDROCHLORIDE 80 MG: 5 INJECTION, SOLUTION INTRAVENOUS at 02:29

## 2024-04-12 RX ADMIN — PHENYLEPHRINE HYDROCHLORIDE 100 MCG: 10 INJECTION INTRAVENOUS at 10:50

## 2024-04-12 RX ADMIN — HYDRALAZINE HYDROCHLORIDE 5 MG: 20 INJECTION INTRAMUSCULAR; INTRAVENOUS at 02:46

## 2024-04-12 RX ADMIN — PHENYLEPHRINE HYDROCHLORIDE 200 MCG: 10 INJECTION INTRAVENOUS at 10:55

## 2024-04-12 RX ADMIN — ALUMINUM HYDROXIDE, MAGNESIUM HYDROXIDE, AND DIMETHICONE 15 ML: 400; 400; 40 SUSPENSION ORAL at 17:30

## 2024-04-12 RX ADMIN — NIFEDIPINE 90 MG: 60 TABLET, FILM COATED, EXTENDED RELEASE ORAL at 20:34

## 2024-04-12 RX ADMIN — SODIUM CITRATE AND CITRIC ACID MONOHYDRATE 30 ML: 334; 500 SOLUTION ORAL at 10:14

## 2024-04-12 RX ADMIN — LABETALOL HYDROCHLORIDE 300 MG: 100 TABLET, FILM COATED ORAL at 09:01

## 2024-04-12 RX ADMIN — ONDANSETRON 4 MG: 2 INJECTION INTRAMUSCULAR; INTRAVENOUS at 18:23

## 2024-04-12 RX ADMIN — MORPHINE SULFATE 200 MCG: 4 INJECTION, SOLUTION INTRAMUSCULAR; INTRAVENOUS at 10:40

## 2024-04-12 RX ADMIN — Medication 999 ML/HR: at 11:04

## 2024-04-12 RX ADMIN — HYDRALAZINE HYDROCHLORIDE 10 MG: 20 INJECTION INTRAMUSCULAR; INTRAVENOUS at 03:11

## 2024-04-12 RX ADMIN — FENTANYL CITRATE 15 MCG: 50 INJECTION, SOLUTION INTRAMUSCULAR; INTRAVENOUS at 10:40

## 2024-04-12 RX ADMIN — ACETAMINOPHEN 1000 MG: 500 TABLET ORAL at 09:00

## 2024-04-12 RX ADMIN — KETOROLAC TROMETHAMINE 30 MG: 30 INJECTION, SOLUTION INTRAMUSCULAR at 11:28

## 2024-04-12 RX ADMIN — NIFEDIPINE 90 MG: 60 TABLET, FILM COATED, EXTENDED RELEASE ORAL at 04:34

## 2024-04-12 NOTE — PLAN OF CARE
Goal Outcome Evaluation:  Plan of Care Reviewed With: patient, spouse        Progress: declining  Outcome Evaluation: Persistent severe range blood pressures for several hours last night despite treatment with IV labetalol and IV hydralazine. Pt denies HA, visual changes, RUQ/epigastric pain, VB, LOF and contractions/pain. No other complaints identified. Pt started on magnesium and MFM consulted with ultimate recommendation for delivery made. Pt and  reluctant to agree to delivery despite counseling from this RN, Dr. Carson, and Dr. Rivera. Pam NP Galina currently at bedside for consult. Pt and  plan to notify RN of their decision of whether or not they will agree to delivery after PAM consult.

## 2024-04-12 NOTE — PROGRESS NOTES
Indication for    Indication: 39 y.o.  @ 32w3d   Superimposed pre-eclampsia on chronic hypertension with severe features of persistent severe hypertension despite using the entire protocol.  She had both iV labetalol and iv hydralazine and was still having severe range blood pressures earlier today for several hours in a row.  Case discussed with MFM per protocol and she suggested delivery for severe pre-eclampsia with acute severe features uncontrolled.  Discussed with patient who was initially upset with this, but she came around to understanding our recommendations after discussion with Dr. Rivera and .  R/B/A reviewed, voiced understanding and wishes to proceed.   Discussed tubal but currently declines   Diagnoses:   1. Pregnancy at 32w3d   2. superimposed pre-eclampsia on chronic hypertension   3. DM- pre-gestational, has been managed with [facility]  4. Grand multiparity with 6 prior    5. AMA - Normal NIP   6. Abnormal dopplers despite No IUGR - suspect significant placental pathology from above   Planned Procedures:  1. , low transverse   Planned anesthesia: spinal   Pre-Op H/H: 13.8/41.0   Placental Location: anterior   Pre-Op Antibiotics: kefzol   Consent: signed and on the chart   Plan: To OR for planned procedure, all questions/concerns addressed with patient and family      Slavador Carson MD   2024  10:22 EDT

## 2024-04-12 NOTE — PROGRESS NOTES
Came in to see pt after she maxed out on both labetalol and hydralazine on antihypertensive protocol.  I discussed with her in no uncertain terms I recommend delivery for CHTN with SI preE with SF.    I spoke to this patient and her  at length about pre-eclampsia and the implications of her diagnosis.  Patient has a P/C ratio that is 500's, significantly  increased from 200's a few weeks ago.  In addition, she has been stable on her labetalol/procardia regimen until yesterday.  Overnight, she spiked severe range pressures for 4 hours and was maxed out on the antihypertension protocol.  We discussed that this is very typical of preE and represents a vasoconstrictive process.  This also is supported by her incidentally found AEDF on dopplers in her normally grown fetus.       I counseled her on pre-eclampsia and that it is a blood pressure issue in pregnancy usually characterized by proteinuria that can affect every organ system and comes from the placenta.  We discussed that it is ALWAYS safest for mom to deliver with a diagnosis of pre-eclampsia but that we often recommend expectant management if mom is stable to optimize both maternal and fetal outcome depending on gestational age.  However, at 32 weeks with steroids and unable to be controlled BP's, this presents a significant risk to both mother and fetus.  I discussed risk of masssive abruption, seizure, stroke, and death and recommended delivery.        I briefly did discuss with her issues with feeding, temperature regulation, jaundice, hypoglycemia common to late  babies but that 32-34 week babies generally do not have longterm issues and do very well.  She would like to discuss further with the NICU team so we asked them to return to discuss with her.       All her questions were answered.  She will discuss with her  and NICU and make a decision.  I discussed with her if she decides not to move forward with delivery we would likely have  her sign something that says she was declining recommendations.  She understands.  Had discussed with Dr. Carson and we are on the same page.  Appreciate his care and care of RN team.     Galina Rivera MD [facility] [place] Fetal Medicine-[facility]  Office: [phone]  [email or web address]

## 2024-04-12 NOTE — ANESTHESIA PROCEDURE NOTES
Spinal Block      Patient location during procedure: OB  Start Time: 4/12/2024 10:40 AM  Stop Time: 4/12/2024 10:44 AM  Indication:at surgeon's request, post-op pain management and procedure for pain  Performed By  CRNA/CAA: Cele Pearson CRNA  Preanesthetic Checklist  Completed: patient identified, IV checked, site marked, risks and benefits discussed, surgical consent, monitors and equipment checked, pre-op evaluation and timeout performed  Spinal Block Procedure  Approach:midline  Guidance:landmark technique  Location:L3-L4  Needle Type:Sprotte  Needle Gauge:24 G  Placement of Spinal needle event:cerebrospinal fluid aspirated  Paresthesia: no  Fluid Appearance:clear  Medications: fentaNYL citrate (PF) (SUBLIMAZE) injection - Intrathecal   15 mcg - 4/12/2024 10:40:00 AM  Morphine sulfate (PF) injection - Spinal   200 mcg - 4/12/2024 10:40:00 AM  bupivacaine PF (MARCAINE) 0.75 % injection - Spinal   1.6 mL - 4/12/2024 10:40:00 AM   Post Assessment  Patient Tolerance:patient tolerated the procedure well with no apparent complications  Complications no

## 2024-04-12 NOTE — SIGNIFICANT NOTE
04/12/24 0123 04/12/24 0139   Vital Signs   Temp 98.2 °F (36.8 °C)  --    Temp src Oral  --    Heart Rate 85 90   Heart Rate Source Monitor Monitor   Resp 18  --    Resp Rate Source Visual  --    /89 169/95   BP Location Left arm Left arm   BP Method Automatic Automatic   Patient Position Sitting Sitting     Call to Dr. Carson to notify of above blood pressures and that the patient's scheduled Labetalol 300mg was administered at 0123. Instructed by MD to obtain another blood pressure in 15 minutes and to proceed with Labetalol Hypertensive Protocol if that BP reading is severe range.

## 2024-04-12 NOTE — ANESTHESIA POSTPROCEDURE EVALUATION
Patient: Fany Brunson    Procedure Summary       Date: 24 Room / Location:  [facility] /  [facility]    Anesthesia Start: 1040 Anesthesia Stop: 1147    Procedure:  SECTION REPEAT (Abdomen) Diagnosis:       Pre-existing diabetes mellitus during pregnancy in second trimester      Pre-existing essential hypertension during pregnancy in second trimester      Grand multiparity with current pregnancy in second trimester      Previous  delivery, antepartum      (Pre-existing diabetes mellitus during pregnancy in second trimester [O24.312])      (Pre-existing essential hypertension during pregnancy in second trimester [O10.012])      (Grand multiparity with current pregnancy in second trimester [O09.42])      (Previous  delivery, antepartum [O34.219])    Surgeons: Salvador Carson MD Provider: Alfredo Mills MD    Anesthesia Type: spinal ASA Status: 3            Anesthesia Type: spinal    Vitals  Vitals Value Taken Time   /74 24 1246   Temp     Pulse 85 24 1250   Resp     SpO2 96 % 24 1250   Vitals shown include unfiled device data.        Post Anesthesia Care and Evaluation    Patient location during evaluation: PACU  Patient participation: complete - patient participated  Level of consciousness: awake  Pain management: adequate    Airway patency: patent  Anesthetic complications: No anesthetic complications  PONV Status: none  Cardiovascular status: acceptable  Respiratory status: acceptable  Hydration status: acceptable

## 2024-04-12 NOTE — PROGRESS NOTES
Patient has entered severe range blood pressure protocol recently. Was not responsive to stacked doses of labetalol and has not responded to stacked doses of hydralazine.     Working to start magnesium for severe features at this time.     Part of issue is patient is frustrated with out interventions. Reports that a lot of these numbers are very similar to what happens to her blood pressure when she is not even pregnant.     Per current hospital protocols she is okay to stay on antepartum with magnesium     Severe range blood pressure started at 1:23 and has not come down despite multiple interventions     ROS   No headache   No vision change  No RUQ pain     Physical Exam  Vitals reviewed. Exam conducted with a chaperone present.   Constitutional:       General: She is not in acute distress.     Appearance: Normal appearance. She is obese.   Pulmonary:      Effort: Pulmonary effort is normal. No respiratory distress.   Abdominal:      General: There is distension (gravid).      Tenderness: There is no abdominal tenderness.   Musculoskeletal:         General: No tenderness.      Right lower leg: Edema present.      Left lower leg: Edema present.   Skin:     General: Skin is warm and dry.   Neurological:      General: No focal deficit present.      Mental Status: She is alert.      Deep Tendon Reflexes: Reflexes normal.       Difficult situation   Multiple severe range blood pressures has exceeded protocol IV   For her she does not feel this is atypical, but her blood pressure is technically severe.   Per protocol, consult MFM will be our next step, awaiting nursing to discuss     Salvador Carson MD  2024  04:51 EDT      Note this has been ongoing note, adjusted and changed during this current episode as the picture has been coming into focus    Salvador Carson MD  2024  04:51 EDT      Also see prior addendum from her initial H&P.   She reacted poorly to our request to have  talk with her.  "She felt that her other deliveries were at term and she has delivered at term all of her other pregnancies despite being treated for chronic hypertension during those episodes     Salvador Carson MD  2024  04:51 EDT      Per protocol, STAT MFM consult recommended    So we contacted Dr. Rivrea to review.   Her recommendation is to give her steroids   Procardia XL 90 mg being given early   Betamethasone to be done early   Repeat labs   Proceed with delivery in AM by repeat .    Slavador Carson MD  2024  04:51 EDT    Dr. Rivera's recommendations were reviewed with patient.   The patient \"didn't know what to say\"  The patient felt we were basing a lot of this on an ultrasound when the baby was sleeping or inactive and we were over-reacting to those results as she did not have any symptoms from this hypertension and that the baby has great movement.     I tried to explain the issues with hypertension as a silent killer and the impact on an organ as delicate as the placenta- especially in the setting of pre-eclampsia which is a pregnancy specific complication. In the end she was understanding of some of what I said, but still expressed to the nurse and I that she felt things were okay and we were essentially over-reacting.     Will tentatively schedule her for delivery this AM  NICU is on diversion as well     Salvador Carson MD  2024  04:51 EDT      "

## 2024-04-12 NOTE — PLAN OF CARE
Problem: Adult Inpatient Plan of Care  Goal: Plan of Care Review  4/12/2024 1720 by Whitney Ley RN  Outcome: Ongoing, Progressing  4/12/2024 1342 by Whitney Ley RN  Outcome: Ongoing, Progressing  4/12/2024 1341 by Whitney Ley RN  Outcome: Ongoing, Progressing  4/12/2024 0815 by Whitney Ley RN  Outcome: Ongoing, Progressing  Goal: Patient-Specific Goal (Individualized)  4/12/2024 1720 by Whitney Ley RN  Outcome: Ongoing, Progressing  4/12/2024 1342 by Whitney Ley RN  Outcome: Ongoing, Progressing  4/12/2024 1341 by Whitney Ley RN  Outcome: Ongoing, Progressing  4/12/2024 0815 by Whitney Ley RN  Outcome: Ongoing, Progressing  Goal: Absence of Hospital-Acquired Illness or Injury  4/12/2024 1720 by Whitney Ley RN  Outcome: Ongoing, Progressing  4/12/2024 1342 by Whitney Ley RN  Outcome: Ongoing, Progressing  4/12/2024 1341 by Whitney Ley RN  Outcome: Ongoing, Progressing  4/12/2024 0815 by Whitney Ley RN  Outcome: Ongoing, Progressing  Intervention: Identify and Manage Fall Risk  Recent Flowsheet Documentation  Taken 4/12/2024 0900 by Whitney Ley RN  Safety Promotion/Fall Prevention:   fall prevention program maintained   nonskid shoes/slippers when out of bed  Intervention: Prevent Skin Injury  Recent Flowsheet Documentation  Taken 4/12/2024 0900 by Whitney Ley RN  Body Position: position changed independently  Intervention: Prevent and Manage VTE (Venous Thromboembolism) Risk  Recent Flowsheet Documentation  Taken 4/12/2024 1345 by Whitney Ley RN  VTE Prevention/Management:   bilateral   sequential compression devices on  Taken 4/12/2024 1145 by Whitney Ley RN  VTE Prevention/Management:   bilateral   sequential compression devices on  Taken 4/12/2024 0926 by Whitney Ley RN  VTE Prevention/Management:   bilateral   sequential compression devices on  Taken 4/12/2024/2024 0900 by Whitney Ley, RN  Activity Management:  bedrest  VTE Prevention/Management:   bilateral   sequential compression devices on  Range of Motion: active ROM (range of motion) encouraged  Goal: Optimal Comfort and Wellbeing  4/12/2024 1720 by Whitney Ley RN  Outcome: Ongoing, Progressing  4/12/2024 1342 by Wihtney Ley RN  Outcome: Ongoing, Progressing  4/12/2024 1341 by Whitney Ley RN  Outcome: Ongoing, Progressing  4/12/2024 0815 by Whitney Ley RN  Outcome: Ongoing, Progressing  Intervention: Provide Person-Centered Care  Recent Flowsheet Documentation  Taken 4/12/2024 1231 by Whitney Ley RN  Trust Relationship/Rapport:   care explained   choices provided   emotional support provided   empathic listening provided   questions answered   questions encouraged   reassurance provided   thoughts/feelings acknowledged  Taken 4/12/2024 0900 by Whitney Ley RN  Trust Relationship/Rapport:   care explained   choices provided   emotional support provided   empathic listening provided   questions answered   questions encouraged   reassurance provided   thoughts/feelings acknowledged  Goal: Readiness for Transition of Care  4/12/2024 1720 by Whitney Ley RN  Outcome: Ongoing, Progressing  4/12/2024 1342 by Whitney Ley RN  Outcome: Ongoing, Progressing  4/12/2024 1341 by Whitney Ley RN  Outcome: Ongoing, Progressing  4/12/2024 0815 by Whitney Ley RN  Outcome: Ongoing, Progressing     Problem: Fall Injury Risk  Goal: Absence of Fall and Fall-Related Injury  4/12/2024 1720 by Whitney Ley RN  Outcome: Ongoing, Progressing  4/12/2024 1342 by Whitney Ley RN  Outcome: Ongoing, Progressing  4/12/2024 1341 by Whitney Lye RN  Outcome: Ongoing, Progressing  4/12/2024 0815 by Whitney Ley RN  Outcome: Ongoing, Progressing  Intervention: Promote Injury-Free Environment  Recent Flowsheet Documentation  Taken 4/12/2024 0900 by Whintey Ley RN  Safety Promotion/Fall Prevention:   fall prevention program  maintained   nonskid shoes/slippers when out of bed     Problem: Pain Acute  Goal: Acceptable Pain Control and Functional Ability  2024 1720 by Whitney Ley RN  Outcome: Ongoing, Progressing  2024 1342 by Whitney Ley RN  Outcome: Ongoing, Progressing  Intervention: Optimize Psychosocial Wellbeing  Recent Flowsheet Documentation  Taken 2024 1231 by Whitney Ley RN  Diversional Activities: smartphone  Taken 2024 0900 by Whitney Ley RN  Diversional Activities: smartphone     Problem: Adjustment to Role Transition (Postpartum  Delivery)  Goal: Successful Maternal Role Transition  2024 1720 by Whitney Ley RN  Outcome: Ongoing, Progressing  2024 1342 by Whitney Ley RN  Outcome: Ongoing, Progressing     Problem: Bleeding (Postpartum  Delivery)  Goal: Hemostasis  2024 1720 by Whitney Ley RN  Outcome: Ongoing, Progressing  2024 1342 by Whitney Ley RN  Outcome: Ongoing, Progressing     Problem: Infection (Postpartum  Delivery)  Goal: Absence of Infection Signs and Symptoms  2024 1720 by Whitney Ley RN  Outcome: Ongoing, Progressing  2024 1342 by Whitney Ley RN  Outcome: Ongoing, Progressing     Problem: Pain (Postpartum  Delivery)  Goal: Acceptable Pain Control  2024 1720 by Whitney Ley RN  Outcome: Ongoing, Progressing  2024 1342 by Whitney Ley RN  Outcome: Ongoing, Progressing     Problem: Postoperative Nausea and Vomiting (Postpartum  Delivery)  Goal: Nausea and Vomiting Relief  2024 1720 by Whitney Ley RN  Outcome: Ongoing, Progressing  2024 1342 by Whitney Ley RN  Outcome: Ongoing, Progressing     Problem: Postoperative Urinary Retention (Postpartum  Delivery)  Goal: Effective Urinary Elimination  2024 1720 by Whitney Ley RN  Outcome: Ongoing, Progressing  2024 1342 by Whitney Ley RN  Outcome: Ongoing,  Progressing   Goal Outcome Evaluation:      POC reviewed and updated throughout shift.  Pt delivered via repeat c/s this AM due to persistent HTN.  NB transferred to [facility].  Pt continues on Mag IV gtt until 24 hours after delivery. BP stable at this time.

## 2024-04-12 NOTE — ANESTHESIA PREPROCEDURE EVALUATION
Anesthesia Evaluation     NPO Solid Status: > 8 hours  NPO Liquid Status: > 8 hours           Airway   Mallampati: I  No difficulty expected  Dental      Pulmonary    Cardiovascular   Exercise tolerance: good (4-7 METS)    (+) hypertension      Neuro/Psych  (+) psychiatric history  GI/Hepatic/Renal/Endo    (+) diabetes mellitus    Musculoskeletal     Abdominal    Substance History      OB/GYN    (+) Pregnant, pregnancy induced hypertension        Other                    Anesthesia Plan    ASA 3     spinal       Anesthetic plan, risks, benefits, and alternatives have been provided, discussed and informed consent has been obtained with: patient.    CODE STATUS:    Level Of Support Discussed With: Patient  Code Status (Patient has no pulse and is not breathing): CPR (Attempt to Resuscitate)  Medical Interventions (Patient has pulse or is breathing): Full Support

## 2024-04-12 NOTE — NON STRESS TEST
Fany Brunson, a  at 32w3d with an SINDI of 2024, by Last Menstrual Period, was seen at [address] for a nonstress test.    Chief Complaint   Patient presents with    Elevated Blood Pressure     Sent from [facility] office where was being seen for GHTN and GDM.  BP elevated in [facility] office       Patient Active Problem List   Diagnosis    High-risk pregnancy in third trimester    Gestational diabetes mellitus, class A2    Chronic hypertension with exacerbation during pregnancy in third trimester    S/P repeat low transverse     Pre-existing diabetes mellitus during pregnancy in third trimester    Pre-existing essential hypertension during pregnancy in third trimester    Grand multiparity with current pregnancy in third trimester    Diabetes mellitus    Hypertension    Hypertension affecting pregnancy    Previous  delivery, antepartum    Multigravida of advanced maternal age in third trimester    Abnormal  ultrasound       Start Time:   Stop Time: 19    Interpretation A  Nonstress Test Interpretation A: Reactive

## 2024-04-12 NOTE — OP NOTE
Section Full Procedure Note    Indications: previous  section low transverse    Pre-operative Diagnosis: 1) Pregnancy at 32w3d             2) Prior  x 6                                              3) Super-imposed pre-eclampsia with severe features (persistent severe hypertension) on chronic hypertension                                             4) DM, pre-gestational                                             5)  delivery for #3     Post-operative Diagnosis: same    Surgeon: Salvador Carson MD     Assistants: MD Dr. Dave Mullen assisted me in performing this , he was instrumental in the delivery of the baby and with the operation, both with physical assistance completing the steps of the operation from their side of the table as well in assistance in clinical judgement during to procedure.     Anesthesia: Spinal anesthesia    ASA Class:  per anesthesia         Procedure Details   The patient was seen in the Holding Room. The risks, benefits, complications, treatment options, and expected outcomes were discussed with the patient.  The patient concurred with the proposed plan, giving informed consent.  The site of surgery properly noted/marked. The patient was taken to Operating Room # 1, identified as Fany Brunson and the procedure verified as  Delivery. A Time Out was held and the above information confirmed.    After induction of anesthesia, the patient was draped and prepped in the usual sterile manner. A Pfannenstiel incision was made and carried down through the subcutaneous tissue to the fascia. Fascial incision was made and extended transversely. The fascia was  from the underlying rectus tissue superiorly and inferiorly. The peritoneum was identified and entered. Peritoneal incision was extended longitudinally.  A low transverse uterine incision was made. Delivered from breech presentation was a 4#5oz, Male with Apgar  scores of 8 at one minute and 9 at five minutes. Nuchal cord was not noted.  After the umbilical cord was clamped and cut cord blood was obtained for evaluation including cord blood gases - CBA -7.30/ -4.7, CBV - 7.23/-4.0. The placenta was removed intact and appeared mushy/edematous. The uterine outline, tubes and ovaries appeared normal. The uterine incision was closed with running locked sutures of 0 Monocryl. A second layer of 0 Monocryl was used in an imbricating fashion.  Hemostasis was observed. Lavage was carried out until clear. The peritoneum was closed with 3-0 Monocryl in a running fashion. The fascia was then reapproximated with running sutures of 0 PDS. The subcutaneous space was opened to better approximate the skin. The skin was reapproximated with 4-0 Vicryl in a subcuticular stitch.     Instrument, sponge, and needle counts were correct prior the abdominal closure and at the conclusion of the case.     Findings:  Normal pelvic anatomy     Quantitative Blood Loss:  per RN            Drains: Martinez                   Specimens: placenta to path, gases to lab                  Complications:  None; patient tolerated the procedure well.           Disposition: PACU - hemodynamically stable.           Condition: stable    Salvador Carson MD  4/12/2024  11:43 EDT

## 2024-04-12 NOTE — NURSING NOTE
This is an ongoing narrative note:    0412 Call to Dr. Rivera for consult with Dr. Carson at my side. Notified Dr. Rivera at this point in time patient has had severe range blood pressures that have persisted since 0123 am despite treatment with the Labetalol and Hydralazine algorithm. Discussed medication dosing and timing and blood pressures with MD including that patient has remained completely asymptomatic, FHR tracing is reassuring and that patient was started on magnesium sulfate infusion with a 6 gram bolus at 0331 am followed by two grams per hour. Dr. Rivera has recommended to reschedule 2nd BMZ dose to be given 12 hours after first dose instead of 24 hours, repeat PIH labs stat, Administer scheduled dose of Procardia 90mg XL early and if two severe range blood pressures obtained in a row 45 minutes following Procardia dose then administer a 1 time dose of 80mg of Labetalol IV at that time. It is Dr. Rivera's recommendation PT be delivered this morning to follow the 0730 c/s.    0434 This RN accompanied Dr. Carson to the bedside to discuss 's recommendations for delivery. Despite extensive counseling by RN and MD pt is reluctant to agree with plan to deliver at this time. Will have Dr. Rivera come to bedside to discuss recommendations personally.     0448 Dr. Rivera notified of pt request for in-person discussion.     0610 Dr. Rivera at bedside to discuss recommendations with pt. Spouse at bedside.     0635 After discussion with M pt and  have requested to see Riccardo for consult. Riccardo called and notified of  recommendation for delivery and pt request. Riccardo en route to bedside.

## 2024-04-12 NOTE — LACTATION NOTE
P7, [id number] [place]- new admission, mom on PP Mag infusion, baby admitted to [facility], Mom (40yo) has risk factor of AMA for low milk supply.  Pregnancy was spontaneous, no fertility tx required.  She reports that she BF some of her previous babies up to approx 6 mos of age & formula fed several that developed a milk protein allergy & had to be put on a special formula.  She denies issues with Bf'g or milk supply in her hx.  This is her first baby that has required admission to NICU & first time to use HGP.    Education provided:  Use & cleaning of pump including daily sterilization; frequency/duration of pumping; milk collection, storage/safe milk handling & labeling; breast massage; & hand expression prior to & after pumping.    Verbalized understanding.     Given sterilization bag & pt labels to label EBM.  Encouraged mom to take her HGP kit with her when she is discharged if baby is still admitted to [facility] as she can use her kit in HGP at baby's bedside while baby is a pt there.  She does not have a PBP & has Medicare as her primary insurance with [employer] as a secondary insurance.  Informed her that Medicare does not cover PBP & because it is primary to Passport that means [employer]port will not cover a PBP either.  She is on WIC.  Informed her that she should qualify for PBP rental through WIC since baby is in NICU.  Onsite [facility] rep notified to coordinate PBP rental for her, WIC Referral Form completed & slid under WIC reps office door.    Mother denies questions/concerns at this time.  Encouraged to call for help when needed.  LC # on WB.

## 2024-04-13 LAB
BASOPHILS # BLD AUTO: 0.04 10*3/MM3 (ref 0–0.2)
BASOPHILS NFR BLD AUTO: 0.3 % (ref 0–1.5)
DEPRECATED RDW RBC AUTO: 42.5 FL (ref 37–54)
EOSINOPHIL # BLD AUTO: 0.01 10*3/MM3 (ref 0–0.4)
EOSINOPHIL NFR BLD AUTO: 0.1 % (ref 0.3–6.2)
ERYTHROCYTE [DISTWIDTH] IN BLOOD BY AUTOMATED COUNT: 13.7 % (ref 12.3–15.4)
GLUCOSE BLDC GLUCOMTR-MCNC: 128 MG/DL (ref 70–130)
GLUCOSE BLDC GLUCOMTR-MCNC: 129 MG/DL (ref 70–130)
GLUCOSE BLDC GLUCOMTR-MCNC: 146 MG/DL (ref 70–130)
GLUCOSE BLDC GLUCOMTR-MCNC: 174 MG/DL (ref 70–130)
HCT VFR BLD AUTO: 34.5 % (ref 34–46.6)
HGB BLD-MCNC: 11.1 G/DL (ref 12–15.9)
IMM GRANULOCYTES # BLD AUTO: 0.11 10*3/MM3 (ref 0–0.05)
IMM GRANULOCYTES NFR BLD AUTO: 0.7 % (ref 0–0.5)
LYMPHOCYTES # BLD AUTO: 2.16 10*3/MM3 (ref 0.7–3.1)
LYMPHOCYTES NFR BLD AUTO: 14.4 % (ref 19.6–45.3)
MCH RBC QN AUTO: 27.7 PG (ref 26.6–33)
MCHC RBC AUTO-ENTMCNC: 32.2 G/DL (ref 31.5–35.7)
MCV RBC AUTO: 86 FL (ref 79–97)
MONOCYTES # BLD AUTO: 0.6 10*3/MM3 (ref 0.1–0.9)
MONOCYTES NFR BLD AUTO: 4 % (ref 5–12)
NEUTROPHILS NFR BLD AUTO: 12.09 10*3/MM3 (ref 1.7–7)
NEUTROPHILS NFR BLD AUTO: 80.5 % (ref 42.7–76)
NRBC BLD AUTO-RTO: 0 /100 WBC (ref 0–0.2)
PLATELET # BLD AUTO: 238 10*3/MM3 (ref 140–450)
PMV BLD AUTO: 11.2 FL (ref 6–12)
RBC # BLD AUTO: 4.01 10*6/MM3 (ref 3.77–5.28)
WBC NRBC COR # BLD AUTO: 15.01 10*3/MM3 (ref 3.4–10.8)

## 2024-04-13 PROCEDURE — 85025 COMPLETE CBC W/AUTO DIFF WBC: CPT | Performed by: OBSTETRICS & GYNECOLOGY

## 2024-04-13 PROCEDURE — 25810000003 LACTATED RINGERS PER 1000 ML: Performed by: OBSTETRICS & GYNECOLOGY

## 2024-04-13 PROCEDURE — 25010000002 ENOXAPARIN PER 10 MG: Performed by: OBSTETRICS & GYNECOLOGY

## 2024-04-13 PROCEDURE — 25010000002 MAGNESIUM SULFATE 20 GM/500ML SOLUTION: Performed by: OBSTETRICS & GYNECOLOGY

## 2024-04-13 PROCEDURE — 25010000002 KETOROLAC TROMETHAMINE PER 15 MG: Performed by: OBSTETRICS & GYNECOLOGY

## 2024-04-13 PROCEDURE — 82948 REAGENT STRIP/BLOOD GLUCOSE: CPT

## 2024-04-13 RX ADMIN — ACETAMINOPHEN 1000 MG: 500 TABLET ORAL at 09:22

## 2024-04-13 RX ADMIN — LABETALOL HYDROCHLORIDE 300 MG: 100 TABLET, FILM COATED ORAL at 00:10

## 2024-04-13 RX ADMIN — OXYCODONE HYDROCHLORIDE 10 MG: 10 TABLET ORAL at 20:21

## 2024-04-13 RX ADMIN — ENOXAPARIN SODIUM 40 MG: 100 INJECTION SUBCUTANEOUS at 12:19

## 2024-04-13 RX ADMIN — LABETALOL HYDROCHLORIDE 300 MG: 100 TABLET, FILM COATED ORAL at 09:24

## 2024-04-13 RX ADMIN — NITROFURANTOIN MONOHYDRATE/MACROCRYSTALLINE 100 MG: 25; 75 CAPSULE ORAL at 09:25

## 2024-04-13 RX ADMIN — METFORMIN HYDROCHLORIDE 500 MG: 500 TABLET, EXTENDED RELEASE ORAL at 07:18

## 2024-04-13 RX ADMIN — NIFEDIPINE 90 MG: 60 TABLET, FILM COATED, EXTENDED RELEASE ORAL at 09:23

## 2024-04-13 RX ADMIN — KETOROLAC TROMETHAMINE 15 MG: 15 INJECTION, SOLUTION INTRAMUSCULAR; INTRAVENOUS at 06:00

## 2024-04-13 RX ADMIN — OXYCODONE 5 MG: 5 TABLET ORAL at 09:29

## 2024-04-13 RX ADMIN — Medication 1 TABLET: at 09:22

## 2024-04-13 RX ADMIN — ACETAMINOPHEN 1000 MG: 500 TABLET ORAL at 03:06

## 2024-04-13 RX ADMIN — SODIUM CHLORIDE, POTASSIUM CHLORIDE, SODIUM LACTATE AND CALCIUM CHLORIDE 75 ML/HR: 600; 310; 30; 20 INJECTION, SOLUTION INTRAVENOUS at 03:08

## 2024-04-13 RX ADMIN — DOCUSATE SODIUM 100 MG: 100 CAPSULE, LIQUID FILLED ORAL at 20:21

## 2024-04-13 RX ADMIN — IBUPROFEN 600 MG: 600 TABLET ORAL at 17:45

## 2024-04-13 RX ADMIN — ACETAMINOPHEN 650 MG: 325 TABLET ORAL at 21:58

## 2024-04-13 RX ADMIN — MAGNESIUM SULFATE HEPTAHYDRATE 2 G/HR: 40 INJECTION, SOLUTION INTRAVENOUS at 01:00

## 2024-04-13 RX ADMIN — ACETAMINOPHEN 650 MG: 325 TABLET ORAL at 15:42

## 2024-04-13 RX ADMIN — NIFEDIPINE 90 MG: 60 TABLET, FILM COATED, EXTENDED RELEASE ORAL at 20:21

## 2024-04-13 RX ADMIN — METFORMIN HYDROCHLORIDE 500 MG: 500 TABLET, EXTENDED RELEASE ORAL at 17:46

## 2024-04-13 RX ADMIN — LABETALOL HYDROCHLORIDE 300 MG: 100 TABLET, FILM COATED ORAL at 17:45

## 2024-04-13 RX ADMIN — KETOROLAC TROMETHAMINE 15 MG: 15 INJECTION, SOLUTION INTRAMUSCULAR; INTRAVENOUS at 00:11

## 2024-04-13 NOTE — LACTATION NOTE
"Pt reports she cont to pump every 3 hours. She reports getting \"very little colostrum\" out and it was taken to baby. Pt denies questions. Encouraged to call LC as needed.    Lactation Consult Note    Evaluation Completed: 2024 11:33 EDT  Patient Name: Fany Brunson  :  1985  MRN:  [id number]     REFERRAL  INFORMATION:                                         DELIVERY HISTORY:        Skin to skin initiation date/time:      Skin to skin end date/time:           MATERNAL ASSESSMENT:                               INFANT ASSESSMENT:  Information for the patient's :  Petty BrunsoncaseyChava [[id number]]   No past medical history on file.                                                                                                   MATERNAL INFANT FEEDING:                                                                       EQUIPMENT TYPE:                                 BREAST PUMPING:          LACTATION REFERRALS:                                           "

## 2024-04-13 NOTE — PLAN OF CARE
Problem: Adult Inpatient Plan of Care  Goal: Plan of Care Review  Outcome: Ongoing, Progressing  Flowsheets (Taken 2024 1851)  Progress: improving  Plan of Care Reviewed With:   patient   spouse  Outcome Evaluation: vss, assessments wnl, neuros normal, mag turned off today at 1111, pt ambulating well, pain well controlled, voiding post brandt, showered, pumping  Goal: Patient-Specific Goal (Individualized)  Outcome: Ongoing, Progressing  Goal: Absence of Hospital-Acquired Illness or Injury  Outcome: Ongoing, Progressing  Intervention: Identify and Manage Fall Risk  Recent Flowsheet Documentation  Taken 2024 1802 by Liza Tripp RN  Safety Promotion/Fall Prevention: safety round/check completed  Taken 2024 1741 by Liza Tripp RN  Safety Promotion/Fall Prevention: safety round/check completed  Taken 2024 1450 by Liza Tripp RN  Safety Promotion/Fall Prevention: safety round/check completed  Goal: Optimal Comfort and Wellbeing  Outcome: Ongoing, Progressing  Goal: Readiness for Transition of Care  Outcome: Ongoing, Progressing     Problem: Fall Injury Risk  Goal: Absence of Fall and Fall-Related Injury  Outcome: Ongoing, Progressing  Intervention: Promote Injury-Free Environment  Recent Flowsheet Documentation  Taken 2024 1802 by Liza Tripp RN  Safety Promotion/Fall Prevention: safety round/check completed  Taken 2024 1741 by Liza Tripp RN  Safety Promotion/Fall Prevention: safety round/check completed  Taken 2024 1450 by Liza Tripp RN  Safety Promotion/Fall Prevention: safety round/check completed     Problem: Pain Acute  Goal: Acceptable Pain Control and Functional Ability  Outcome: Ongoing, Progressing     Problem: Adjustment to Role Transition (Postpartum  Delivery)  Goal: Successful Maternal Role Transition  Outcome: Ongoing, Progressing     Problem: Bleeding (Postpartum  Delivery)  Goal: Hemostasis  Outcome: Ongoing, Progressing      Problem: Infection (Postpartum  Delivery)  Goal: Absence of Infection Signs and Symptoms  Outcome: Ongoing, Progressing     Problem: Pain (Postpartum  Delivery)  Goal: Acceptable Pain Control  Outcome: Ongoing, Progressing     Problem: Postoperative Nausea and Vomiting (Postpartum  Delivery)  Goal: Nausea and Vomiting Relief  Outcome: Ongoing, Progressing     Problem: Postoperative Urinary Retention (Postpartum  Delivery)  Goal: Effective Urinary Elimination  Outcome: Ongoing, Progressing     Problem: Skin Injury Risk Increased  Goal: Skin Health and Integrity  Outcome: Ongoing, Progressing     Problem: VTE (Venous Thromboembolism)  Goal: VTE (Venous Thromboembolism) Symptom Resolution  Outcome: Ongoing, Progressing     Problem: Hypertensive Disorders in Pregnancy  Goal: Maternal-Fetal Stabilization  Outcome: Ongoing, Progressing   Goal Outcome Evaluation:  Plan of Care Reviewed With: patient, spouse        Progress: improving  Outcome Evaluation: vss, assessments wnl, neuros normal, mag turned off today at 1111, pt ambulating well, pain well controlled, voiding post brandt, showered, pumping

## 2024-04-13 NOTE — PLAN OF CARE
Goal Outcome Evaluation:           Progress: improving  Outcome Evaluation: Pt post op day 2. Infant at [facility]. VSS. Pt denies HA/vision changest/epigastric pain and had no s/s of Mag toxicity. MgSO4 turned off at 1111. U/O WNL and F/C cathether removed before transitioning to PP. Pt blood sugars were 128, 172 & 129 today. Pt eager to have MgSO4 d/c'd and desires to shower. Pt is pumping and has had minimal output--Dad took to [facility]. Pain is controlled well with Roxicodone 5mg per pt report. Uterus firm and 1 below umbilicus and pt has had scant-minimal bleeding. Pt remains in good spirits despite treatment and inpatient stay away from infant.

## 2024-04-13 NOTE — PLAN OF CARE
Problem: Adult Inpatient Plan of Care  Goal: Plan of Care Review  Outcome: Ongoing, Progressing  Goal: Patient-Specific Goal (Individualized)  Outcome: Ongoing, Progressing  Goal: Absence of Hospital-Acquired Illness or Injury  Outcome: Ongoing, Progressing  Intervention: Prevent and Manage VTE (Venous Thromboembolism) Risk  Goal: Optimal Comfort and Wellbeing  Outcome: Ongoing, Progressing  Intervention: Monitor Pain and Promote Comfort  Intervention: Provide Person-Centered Care  Goal: Readiness for Transition of Care  Outcome: Ongoing, Progressing     Problem: Fall Injury Risk  Goal: Absence of Fall and Fall-Related Injury  Outcome: Ongoing, Progressing     Problem: Pain Acute  Goal: Acceptable Pain Control and Functional Ability  Outcome: Ongoing, Progressing  Intervention: Prevent or Manage Pain  Intervention: Develop Pain Management Plan  Intervention: Optimize Psychosocial Wellbeing     Problem: Adjustment to Role Transition (Postpartum  Delivery)  Goal: Successful Maternal Role Transition  Outcome: Ongoing, Progressing  Intervention: Support Maternal Role Transition     Problem: Bleeding (Postpartum  Delivery)  Goal: Hemostasis  Outcome: Ongoing, Progressing  Intervention: Monitor and Manage Postpartum Bleeding     Problem: Infection (Postpartum  Delivery)  Goal: Absence of Infection Signs and Symptoms  Outcome: Ongoing, Progressing     Problem: Pain (Postpartum  Delivery)  Goal: Acceptable Pain Control  Outcome: Ongoing, Progressing  Intervention: Prevent or Manage Pain     Problem: Postoperative Nausea and Vomiting (Postpartum  Delivery)  Goal: Nausea and Vomiting Relief  Outcome: Ongoing, Progressing     Problem: Postoperative Urinary Retention (Postpartum  Delivery)  Goal: Effective Urinary Elimination  Outcome: Ongoing, Progressing  Intervention: Monitor and Manage Urinary Retention     Problem: Skin Injury Risk Increased  Goal: Skin Health and  Integrity  Outcome: Ongoing, Progressing     Problem: VTE (Venous Thromboembolism)  Goal: VTE (Venous Thromboembolism) Symptom Resolution  Outcome: Ongoing, Progressing  Intervention: Prevent or Manage VTE (Venous Thromboembolism)     Problem: Hypertensive Disorders in Pregnancy  Goal: Maternal-Fetal Stabilization  Outcome: Ongoing, Progressing   Goal Outcome Evaluation:           Progress: no change  Outcome Evaluation: VSS. One severe range pressure. Pt denies ha/visual changes/epigastric pain. Pt post op day 2. Infant at [facility].  Pt not happy about being on MgSO4 but understands need. Pt did not sleep well all night but was able to doze between care.

## 2024-04-14 VITALS
SYSTOLIC BLOOD PRESSURE: 156 MMHG | HEART RATE: 82 BPM | OXYGEN SATURATION: 99 % | RESPIRATION RATE: 18 BRPM | DIASTOLIC BLOOD PRESSURE: 82 MMHG | TEMPERATURE: 98.1 F

## 2024-04-14 PROBLEM — Z98.891 STATUS POST CESAREAN DELIVERY: Status: ACTIVE | Noted: 2024-04-14

## 2024-04-14 LAB — GLUCOSE BLDC GLUCOMTR-MCNC: 91 MG/DL (ref 70–130)

## 2024-04-14 PROCEDURE — 82948 REAGENT STRIP/BLOOD GLUCOSE: CPT

## 2024-04-14 PROCEDURE — 25010000002 ENOXAPARIN PER 10 MG: Performed by: OBSTETRICS & GYNECOLOGY

## 2024-04-14 PROCEDURE — 0503F POSTPARTUM CARE VISIT: CPT | Performed by: OBSTETRICS & GYNECOLOGY

## 2024-04-14 RX ORDER — OXYCODONE HYDROCHLORIDE 5 MG/1
5 TABLET ORAL EVERY 8 HOURS PRN
Qty: 12 TABLET | Refills: 0 | Status: SHIPPED | OUTPATIENT
Start: 2024-04-14 | End: 2024-04-18

## 2024-04-14 RX ORDER — ACETAMINOPHEN 325 MG/1
650 TABLET ORAL EVERY 4 HOURS PRN
Qty: 56 TABLET | Refills: 0 | Status: SHIPPED | OUTPATIENT
Start: 2024-04-14 | End: 2024-04-28

## 2024-04-14 RX ORDER — IBUPROFEN 600 MG/1
600 TABLET ORAL EVERY 6 HOURS
Qty: 56 TABLET | Refills: 0 | Status: SHIPPED | OUTPATIENT
Start: 2024-04-14 | End: 2024-04-16

## 2024-04-14 RX ORDER — NIFEDIPINE 90 MG/1
90 TABLET, EXTENDED RELEASE ORAL EVERY 12 HOURS
Qty: 60 TABLET | Refills: 0 | Status: SHIPPED | OUTPATIENT
Start: 2024-04-14

## 2024-04-14 RX ORDER — LABETALOL 300 MG/1
300 TABLET, FILM COATED ORAL EVERY 8 HOURS SCHEDULED
Qty: 90 TABLET | Refills: 0 | Status: SHIPPED | OUTPATIENT
Start: 2024-04-14

## 2024-04-14 RX ADMIN — IBUPROFEN 600 MG: 600 TABLET ORAL at 02:01

## 2024-04-14 RX ADMIN — IBUPROFEN 600 MG: 600 TABLET ORAL at 08:16

## 2024-04-14 RX ADMIN — LABETALOL HYDROCHLORIDE 300 MG: 100 TABLET, FILM COATED ORAL at 02:01

## 2024-04-14 RX ADMIN — NITROFURANTOIN MONOHYDRATE/MACROCRYSTALLINE 100 MG: 25; 75 CAPSULE ORAL at 08:31

## 2024-04-14 RX ADMIN — ENOXAPARIN SODIUM 40 MG: 100 INJECTION SUBCUTANEOUS at 02:10

## 2024-04-14 RX ADMIN — ACETAMINOPHEN 650 MG: 325 TABLET ORAL at 09:52

## 2024-04-14 RX ADMIN — DOCUSATE SODIUM 100 MG: 100 CAPSULE, LIQUID FILLED ORAL at 08:16

## 2024-04-14 RX ADMIN — METFORMIN HYDROCHLORIDE 500 MG: 500 TABLET, EXTENDED RELEASE ORAL at 08:31

## 2024-04-14 RX ADMIN — LABETALOL HYDROCHLORIDE 300 MG: 100 TABLET, FILM COATED ORAL at 09:51

## 2024-04-14 RX ADMIN — NIFEDIPINE 90 MG: 60 TABLET, FILM COATED, EXTENDED RELEASE ORAL at 08:31

## 2024-04-14 RX ADMIN — ACETAMINOPHEN 650 MG: 325 TABLET ORAL at 03:48

## 2024-04-14 RX ADMIN — OXYCODONE 5 MG: 5 TABLET ORAL at 09:50

## 2024-04-14 NOTE — PROGRESS NOTES
Postoperative Progress Note    Assessment/Plan:  Status post  section. Doing well postoperatively.   - Continue current postoperative care.  - Encouraged ambulation.  - Awaiting flatus.   - Anticipate discharge home on POD#3.     2. Superimposed preeclampsia with severe features (persistent severe hypertension) on chronic hypertension   - s/p 24 hours MgSO4 for seizure prophylaxis.   - Antihypertensive regimen: procardia XL 90 mg Q 12 h and Labetalol 300 mg TID.  - Remains normotensive to mild range on current medications.  - She is asymptomatic of preeclampsia symptoms.   - Will continue to monitor blood pressures and for symptoms of preeclampsia.     3. Pre-gestational diabetes   - HgbA1c 6.5% at intake of pregnancy  - managed on metformin  mg BID   - Fasting glucose 128 this AM--> 2 hours breakfast postprandial 174. Will continue to monitor fasting and after meal glucose.     4. Morbid obesity- BMI 46  - Ordered for Lovenox 40 mg BID for DVT prophylaxis as well as SCDs.     Rh: A positive   T. Pallidum Antibody: NR   Rubella:Immune  Infant: Male infant, in NICU at [facility] due to diversion at [facility]     Hgb 13.8--> 11.1     Subjective:  Postpartum Day 1:  Delivery    The patient feels tired.Pain is well controlled with current medications. Urinary output is adequate. The patient is ambulating well. The patient is tolerating a normal diet. Flatus denies been passed. Reports vaginal bleeding is light. Denies headaches, vision changes, chest pain, shortness of breath, RUQ pain.     Objective:  Vital signs in last 24 hours:  Temp:  [97.5 °F (36.4 °C)-98.1 °F (36.7 °C)] 98.1 °F (36.7 °C)  Heart Rate:  [] 130  Resp:  [15-18] 16  BP: (106-160)/(63-93) 157/81        General:    alert, appears stated age, and cooperative   Pulm:  No increased work of breathing, regular respiration   Abd:  Soft, appropriately tender to palpation, mildly distended    Uterine Fundus:   firm   Incision:  healing well,  no significant drainage, no dehiscence, no significant erythema   DVT Evaluation:  No evidence of DVT seen on physical exam.   Neuro: 2+ DTRs, 2 beats of clonus in left foot, one in right foot     Lab Results   Component Value Date    WBC 15.01 (H) 04/13/2024    HGB 11.1 (L) 04/13/2024    HCT 34.5 04/13/2024    MCV 86.0 04/13/2024     04/13/2024       Skylar Parks MD  4/13/2024  20:07 EDT

## 2024-04-14 NOTE — PROGRESS NOTES
"Assessment/Plan:  Status post  section, postoperative day #2, doing well.  2.  Severe preeclampsia superimposed on chronic hypertension, s/p magnesium sulfate. BPs currently stable on oral meds  3.  Gestational diabetes, blood sugars well-controlled on metformin    Plan:  1.  Patient strongly desiring discharge home.  She is 24 hours status post discontinuation of magnesium sulfate therapy and her blood pressures have been stable.  She appears to be meeting all medical milestones for discharge.  Extensive discharge instructions were given to the patient who verbalized understanding.  She should follow-up in the office in 1 week.    No results found for: \"ABORH\"  Rh+  Rubella:Immune  Admission syphilis screen: Nonreactive  Infant: Male infant, admitted to the NICU      Subjective:  Postpartum Day 2:  Delivery    The patient feels well.Pain is well controlled with current medications. Urinary output is adequate. The patient is ambulating well. The patient is tolerating a normal diet. Flatus has been passed.    Objective:  Vital signs in last 24 hours:  Temp:  [97.5 °F (36.4 °C)-98.2 °F (36.8 °C)] 98.1 °F (36.7 °C)  Heart Rate:  [] 82  Resp:  [16-18] 18  BP: (119-157)/(74-82) 156/82    I/O last 3 completed shifts:  In: 1940.7 [I.V.:1940.7]  Out: 7075 [Urine:7075]  No intake/output data recorded.          General:    alert, appears stated age, and cooperative   Uterine Fundus:   firm   Incision:  healing well, no significant drainage, no dehiscence, no significant erythema   DVT Evaluation:  No evidence of DVT seen on physical exam.     Lab Results   Component Value Date    WBC 15.01 (H) 2024    HGB 11.1 (L) 2024    HCT 34.5 2024    MCV 86.0 2024     2024       Lab Results   Component Value Date    GLUCOSE 122 (H) 2024    BUN 14 2024    CREATININE 0.64 2024    EGFRRESULT 116 2024    EGFR 115.5 2024    BCR 21.9 2024    K 3.8 " 2024    CO2 14.3 (L) 2024    CALCIUM 8.9 2024    PROTENTOTREF 6.0 2024    ALBUMIN 3.5 2024    BILITOT <0.2 2024    AST 13 2024    ALT 14 2024       [facility]    Result Date: 2024  PAT NAME: AVERY MOYA [facility] REC#: [id number] BIRTH DA: 1985 PAT GEND: F ACCOUNT#: [id number] PAT TYPE: O EXAM PORTIA: [id number] REF PHYS WENDIE SAVAGE ACCESSION [id number] Comparison Studies ================= The findings of this study are compared to the prior ultrasound study dated 24 Indication ======== CHTN, DM, Morbid Obesity,  x 6 Maternal Assessment ================== Height 163 cm Height (ft) 5 ft Height (in) 4 in BP syst 158 mmHg BP diast 98 mmHg Method ======= Transabdominal ultrasound examination. View: Suboptimal view: limited by maternal body habitus Suboptimal view: limited by fetal position Suboptimal view: limited by abdominal scarring Pregnancy ========= Li pregnancy. Number of fetuses: 1 Dating ====== Method of dating: based on stated SINDI GA by prior assessment 32 w + 2 d SINDI by prior assessment: 2024 Ultrasound examination on: 2024 GA by U/S based upon: AC, BPD, Femur, HC GA by U/S 33 w + 4 d SINDI by U/S: 2024 Previous dating: based on stated SINDI, selected on 2024 Agreed SINDI of previous datin2024 Assigned: based on stated SINDI, selected on 2024 Assigned GA 32 w + 2 d Assigned SINDI: 2024 Pregnancy length 280 d Fetal Biometry ============ Standard BPD 84.3 mm 34w 0d        86%        Hadlock .6 mm 38w 2d        >99%        Ger .6 mm 35w 3d        89%        Hadlock .7 mm 33w 1d        75%        Hadlock Femur 61.3 mm 31w 6d        25%        Hadlock Humerus 52.4 mm 30w 4d        12%        Ger HC / AC 1.08 EFW 2,113 g         58%        Allen EFW (lb) 4 lb EFW (oz) 11 oz EFW by: Hadlock (BPD-HC-AC-FL) Extended  8.0 mm Head / Face / Neck Cephalic index 0.75          7%        Nicolaides Extremities / Bony Struc FL / BPD 0.73 FL / HC 0.19 FL / AC 0.21 Other Structures  bpm General Evaluation ================ Cardiac activity present.  bpm. Fetal movements present. Presentation transverse / head Lt. Placenta Placental site: anterior, fundal. Amniotic fluid Amount of AF: normal. MVP 4.1 cm. SHANTANU 7.3 cm. Q1 0.0 cm, Q2 1.3 cm, Q3 4.1 cm, Q4 2.0 cm. Fetal Anatomy ============ Cranium: Normal Lateral ventricles: Normal Lips: Normal Profile: Normal Nose: Normal 4-chamber view: documented previously Heart / Thorax Cardiac rhythm: ABNORMAL Cardiac rhythm: irregular with atrial ectopic beats Stomach: Appears normal Kidneys: Appears normal Bladder: Appears normal Gender: male Wants to know gender: yes Fetal Doppler =========== Arterial Umbilical A PI 1.53         >99%        Aimee Umbilical A RI 0.80         99%        Aimee Umbilical A PS 34.61 cm/s         3%        Ebbing Umbilical A ED -6.38 cm/s Umbilical A TAmax 17.68 cm/s         <1%        Ebbing Umbilical A MD 4.86 cm/s Umbilical A S / D 5.13         >99%        Aimee Umbilical A  bpm Biophysical Profile =============== 2: Fetal breathing movements 2: Gross body movements 0: Fetal tone 2: Amniotic fluid volume 6/8 Biophysical profile score Impression ========= Transverse presentation. Anterior fundal placenta. SHANTANU 7 cm, which is low normal. EFW 2113 g (58%, AC 75%) BPP 6/8 ( no tone ) Intermittent AEDF (dopplers all elevated on ECHO incidentally, so followed up today). Fetal arrhythmia noted, suspected PAC's Recommendation =============== See epic consult note Coding ====== Description: 76816-26 Follow Up Ultrasound Description: 76819-26 BPP without NST Sonographer: Selin Gage [facility] Physician: Galina Rivera MD Electronically signed by: Galina Rivera MD at: 2024/04/11 11:41     Current meds:  Scheduled Meds:acetaminophen, 650 mg, Oral, Q6H  enoxaparin, 40 mg, Subcutaneous, Q12H  ibuprofen,  600 mg, Oral, Q6H  labetalol, 300 mg, Oral, Q8H  labetalol, 80 mg, Intravenous, Once  metFORMIN ER, 500 mg, Oral, BID With Meals  NIFEdipine XL, 90 mg, Oral, Q12H  nitrofurantoin (macrocrystal-monohydrate), 100 mg, Oral, Daily  prenatal vitamin, 1 tablet, Oral, Daily  sodium chloride, 10 mL, Intravenous, Q12H  tranexamic acid, 1,000 mg, Intravenous, Once      Continuous Infusions:lactated ringers, 75 mL/hr, Last Rate: Stopped (04/13/24 1111)  magnesium sulfate, 2 g/hr, Last Rate: Stopped (04/13/24 1111)  oxytocin, 125 mL/hr, Last Rate: 125 mL/hr (04/12/24 1251)      PRN Meds:.  acetaminophen    all purpose nipple ointment    aluminum-magnesium hydroxide-simethicone **OR** calcium carbonate    bisacodyl    calcium carbonate    diphenhydrAMINE **OR** diphenhydrAMINE **OR** diphenhydrAMINE    docusate sodium    docusate sodium    droperidol **OR** droperidol    hydrALAZINE **OR** labetalol **OR** NIFEdipine    Hydrocortisone (Perianal)    hydrOXYzine    hydrOXYzine    lidocaine    magnesium hydroxide    naloxone    ondansetron ODT **OR** ondansetron    ondansetron    oxyCODONE **OR** oxyCODONE    oxytocin    promethazine **OR** promethazine    simethicone    sodium chloride    sodium chloride

## 2024-04-14 NOTE — DISCHARGE SUMMARY
[facility]         DISCHARGE SUMMARY    Patient Name: Fany Brunson  : 1985  MRN: [id number]    Date of Admission: 2024  Date of Discharge:  24  Primary Care Physician: Provider, No Known    Consults       Date and Time Order Name Status Description    2024  4:23 PM Inpatient Neonatology Consult              Presenting Problem:   Pre-existing diabetes mellitus during pregnancy in second trimester [O24.312]  Pre-existing essential hypertension during pregnancy in second trimester [O10.012]  Grand multiparity with current pregnancy in second trimester [O09.42]  Previous  delivery, antepartum [O34.219]  Pregnancy [Z34.90]    Active and Resolved Hospital Problems:  Active Hospital Problems    Diagnosis POA    **Status post  delivery [Z98.891] Not Applicable    Pre-eclampsia superimposed on chronic hypertension, delivered [O11.4, O10.92] Yes    Previous  delivery, antepartum [O34.219] Yes    Multigravida of advanced maternal age in third trimester [O09.523] Yes    Abnormal  ultrasound [O28.3] Yes    Pre-existing diabetes mellitus during pregnancy in third trimester [O24.313] Yes    Pre-existing essential hypertension during pregnancy in third trimester [O10.013] Yes    Grand multiparity with current pregnancy in third trimester [O09.43] Yes      Resolved Hospital Problems    Diagnosis POA    Pregnancy [Z34.90] Not Applicable    H/O:  section [Z98.891] Not Applicable         Hospital Course     Hospital Course:  Fany Brunson is a 39 y.o. female was admitted from the maternal-fetal medicine office for severe range blood pressure elevations.  Was also noted to have abnormal umbilical artery Dopplers.  Given persistent severe range blood pressures and abnormal Dopplers, delivery was recommended by maternal-fetal medicine.  She had a history of 6 prior C-sections, so delivery at this time is by repeat .  Her surgery was  unremarkable.  She did receive magnesium sulfate for 24 hours in the postpartum setting.  Her postpartum course was otherwise unremarkable.  The baby did require transfer to the [facility] due to prematurity and the fact that this NICU was at capacity and on diversion.  Patient was therefore requesting discharge as soon as possible.  By postoperative day #2, she was doing very well.  Her blood pressures have been in the mostly mild hypertensive range with oral labetalol and oral Procardia.  This continued to be the case 24 hours after stopping magnesium.  Her sugars were well-controlled on metformin.  Extensive discharge instructions given to the patient verbalized understanding.  She will need to follow-up this week for blood pressure check and postoperative check.    Day of Discharge     Vital Signs:  Temp:  [97.5 °F (36.4 °C)-98.2 °F (36.8 °C)] 98.1 °F (36.7 °C)  Heart Rate:  [] 82  Resp:  [16-18] 18  BP: (127-157)/(74-82) 156/82  Physical Exam: See progress notes      Pertinent  and/or Most Recent Results     LAB RESULTS:      Lab 04/13/24  0555 04/12/24  0600 04/11/24  1221   WBC 15.01* 12.78* 11.31*   HEMOGLOBIN 11.1* 13.8 12.7   HEMATOCRIT 34.5 41.0 40.0   PLATELETS 238 308 276   NEUTROS ABS 12.09* 10.00* 7.23*   IMMATURE GRANS (ABS) 0.11* 0.16* 0.04   LYMPHS ABS 2.16 2.32 3.16*   MONOS ABS 0.60 0.27 0.55   EOS ABS 0.01 0.00 0.27   MCV 86.0 86.1 86.2   LDH  --  139 154         Lab 04/12/24  0600 04/11/24  1221   SODIUM 136 142   POTASSIUM 3.8 4.0   CHLORIDE 107 110*   CO2 14.3* 19.2*   ANION GAP 14.7 12.8   BUN 14 10   CREATININE 0.64 0.72   EGFR 115.5 109.2   GLUCOSE 122* 101*   CALCIUM 8.9 9.4         Lab 04/12/24  0600 04/11/24  1221   TOTAL PROTEIN 6.7 6.5   ALBUMIN 3.5 3.5   GLOBULIN 3.2 3.0   ALT (SGPT) 14 13   AST (SGOT) 13 8   BILIRUBIN <0.2 0.2   ALK PHOS 150* 142*                 Lab 04/11/24  1221   ABO TYPING A   RH TYPING Positive   ANTIBODY SCREEN Negative         Brief Urine Lab Results   (Last result in the past 365 days)        Color   Clarity   Blood   Leuk Est   Nitrite   Protein   CREAT   Urine HCG        04/11/24 1440             40.0               Microbiology Results (last 10 days)       ** No results found for the last 240 hours. **                       Labs Pending at Discharge: Not applicable        Discharge Details        Discharge Medications        New Medications        Instructions Start Date   acetaminophen 325 MG tablet  Commonly known as: TYLENOL   650 mg, Oral, Every 4 Hours PRN      ibuprofen 600 MG tablet  Commonly known as: ADVIL,MOTRIN   600 mg, Oral, Every 6 Hours      oxyCODONE 5 MG immediate release tablet  Commonly known as: ROXICODONE   5 mg, Oral, Every 8 Hours PRN             Changes to Medications        Instructions Start Date   labetalol 300 MG tablet  Commonly known as: NORMODYNE  What changed: when to take this   300 mg, Oral, Every 8 Hours Scheduled      NIFEdipine XL 90 MG 24 hr tablet  Commonly known as: PROCARDIA XL  What changed:   medication strength  how much to take  when to take this   90 mg, Oral, Every 12 Hours             Continue These Medications        Instructions Start Date   B-D GLUCOSE 5 g chewable tablet  Generic drug: glucose   15 g, Oral, As Needed      Droplet Pen Needles 32G X 4 MM misc  Generic drug: Insulin Pen Needle   USE 1 PEN NEEDLE FOUR TIMES A DAY      glucose monitor monitoring kit   1 each, Does not apply, 4 Times Daily      Lancets misc   1 each, Does not apply, 4 Times Daily, Use to test fasting and 2 hours after meals      OneTouch Verio Flex System w/Device kit       OneTouch Verio test strip  Generic drug: glucose blood   Please dispense test strips compatible with the One Touch glucometer. Currently testing blood sugar 7 times a day.             Stop These Medications      aspirin 81 MG chewable tablet     hydrOXYzine pamoate 25 MG capsule  Commonly known as: VISTARIL     Insulin Glargine 100 UNIT/ML injection pen  Commonly  known as: LANTUS SOLOSTAR     Insulin Lispro (1 Unit Dial) 100 UNIT/ML solution pen-injector  Commonly known as: HUMALOG     nitrofurantoin (macrocrystal-monohydrate) 100 MG capsule  Commonly known as: Macrobid     Prenatal Multivitamin + DHA 28-0.8 & 200 MG misc              Allergies   Allergen Reactions    Latex Rash         Discharge Disposition:  Home or Self Care    Diet:  Hospital:  Diet Order   Procedures    Diet: Regular/House; Fluid Consistency: Thin (IDDSI 0)         Discharge Activity:   Activity Instructions       Driving Restrictions      Do not drive while taking narcotic medications or while still having significant abdominal pain.    Type of Restriction: Driving    Driving Restrictions: No Driving Until Next Appointment          No exercise, strenuous activity, or heavy lifting for 6 weeks after delivery.  It is normal to have light to moderate vaginal bleeding for up to 6 weeks after delivery.  You may shower.  No submerging in a bathtub for 6 weeks after delivery.  Do not resume sexual intercourse for 6 weeks.  Call the office or return to the hospital for temperature greater than 100.5°F, heavy vaginal bleeding soaking a pad an hour, severe abdominal or vaginal  pain not improved with pain medication, persistent nausea and vomiting, wound breakdown, or any other medical concerns.  Follow-up with your OB/GYN in 2 weeks.  Please call the office to make an appointment.      CODE STATUS:  Code Status and Medical Interventions:   Ordered at: 04/12/24 1347     Code Status (Patient has no pulse and is not breathing):    CPR (Attempt to Resuscitate)     Medical Interventions (Patient has pulse or is breathing):    Full         Future Appointments   Date Time Provider Department Center   4/16/2024  9:15 AM Salvador Carson MD [facility]       Additional Instructions for the Follow-ups that You Need to Schedule       Call MD With Problems / Concerns   As directed      Instructions: Call for  fever greater than 100.5, severe abdominal pain, drainage or bleeding from the incision, heavy vaginal bleeding greater than 1 pad per hour for more than 2 hours, or any other major medical concerns    Order Comments: Instructions: Call for fever greater than 100.5, severe abdominal pain, drainage or bleeding from the incision, heavy vaginal bleeding greater than 1 pad per hour for more than 2 hours, or any other major medical concerns         Discharge Follow-up with Specified Provider: your OBGYN; 2 Weeks   As directed      To: your OBGYN   Follow Up: 2 Weeks                Time spent on Discharge including face to face service: Greater than 30 minutes    Dorian Acosta MD

## 2024-04-15 LAB
BH BB BLOOD EXPIRATION DATE: NORMAL
BH BB BLOOD EXPIRATION DATE: NORMAL
BH BB BLOOD TYPE BARCODE: 6200
BH BB BLOOD TYPE BARCODE: 6200
BH BB DISPENSE STATUS: NORMAL
BH BB DISPENSE STATUS: NORMAL
BH BB PRODUCT CODE: NORMAL
BH BB PRODUCT CODE: NORMAL
BH BB UNIT NUMBER: NORMAL
BH BB UNIT NUMBER: NORMAL
CROSSMATCH INTERPRETATION: NORMAL
CROSSMATCH INTERPRETATION: NORMAL
UNIT  ABO: NORMAL
UNIT  ABO: NORMAL
UNIT  RH: NORMAL
UNIT  RH: NORMAL

## 2024-04-16 ENCOUNTER — OFFICE VISIT (OUTPATIENT)
Dept: OBSTETRICS AND GYNECOLOGY | Facility: CLINIC | Age: 39
End: 2024-04-16
Payer: MEDICARE

## 2024-04-16 ENCOUNTER — TELEPHONE (OUTPATIENT)
Dept: OBSTETRICS AND GYNECOLOGY | Facility: CLINIC | Age: 39
End: 2024-04-16

## 2024-04-16 VITALS
BODY MASS INDEX: 43.02 KG/M2 | HEIGHT: 64 IN | DIASTOLIC BLOOD PRESSURE: 88 MMHG | SYSTOLIC BLOOD PRESSURE: 151 MMHG | WEIGHT: 252 LBS | HEART RATE: 83 BPM

## 2024-04-16 DIAGNOSIS — I10 ESSENTIAL HYPERTENSION: Primary | ICD-10-CM

## 2024-04-16 PROCEDURE — 3077F SYST BP >= 140 MM HG: CPT | Performed by: OBSTETRICS & GYNECOLOGY

## 2024-04-16 PROCEDURE — 1159F MED LIST DOCD IN RCRD: CPT | Performed by: OBSTETRICS & GYNECOLOGY

## 2024-04-16 PROCEDURE — 1160F RVW MEDS BY RX/DR IN RCRD: CPT | Performed by: OBSTETRICS & GYNECOLOGY

## 2024-04-16 PROCEDURE — 3079F DIAST BP 80-89 MM HG: CPT | Performed by: OBSTETRICS & GYNECOLOGY

## 2024-04-16 PROCEDURE — 0503F POSTPARTUM CARE VISIT: CPT | Performed by: OBSTETRICS & GYNECOLOGY

## 2024-04-16 RX ORDER — METFORMIN HYDROCHLORIDE 500 MG/1
500 TABLET, EXTENDED RELEASE ORAL 2 TIMES DAILY
Qty: 180 TABLET | Refills: 0 | Status: SHIPPED | OUTPATIENT
Start: 2024-04-16

## 2024-04-16 NOTE — TELEPHONE ENCOUNTER
----- Message from Salvador Carson MD sent at 4/16/2024 12:45 PM EDT -----  Hilary, pathology as expected. No intervention. Thanks, Dr. Carson

## 2024-04-16 NOTE — PROGRESS NOTES
"Subjective    is a 39 y.o. female here for Post op exam. Pt is s/p  on 2024 for GHTN, PTD.     Chief Complaint   Patient presents with    Post-op        HPI    39 y.o.    S/P RCD on Thursday due to uncontrolled super-imposed pre-eclampsia   BS okay on Metformin in hospital, not given at discharge, will restart     Review of Systems   Constitutional:  Negative for fever.   Eyes:  Negative for visual disturbance.   Respiratory:  Negative for shortness of breath.    Cardiovascular:  Negative for chest pain.   Gastrointestinal:  Negative for abdominal pain.   Genitourinary:  Negative for hematuria.   Neurological:  Negative for headache.        Objective   /88   Pulse 83   Ht 162.6 cm (64\")   Wt 114 kg (252 lb)   Breastfeeding Yes   BMI 43.26 kg/m²   Physical Exam  Constitutional:       General: She is not in acute distress.     Appearance: Normal appearance. She is well-developed and normal weight.   Neck:      Thyroid: No thyromegaly.   Pulmonary:      Effort: No respiratory distress.   Abdominal:      General: Abdomen is flat. There is no distension (incision - clean, dry and intact).      Palpations: Abdomen is soft.      Tenderness: There is no abdominal tenderness.   Musculoskeletal:         General: No tenderness.      Right lower leg: Edema (trace edema) present.      Left lower leg: Edema present.   Neurological:      General: No focal deficit present.      Mental Status: She is alert and oriented to person, place, and time.      Deep Tendon Reflexes: Reflexes normal (2+ DTRs- no clonus).   Skin:     General: Skin is warm and dry.   Psychiatric:         Behavior: Behavior normal.         Thought Content: Thought content normal.         Judgment: Judgment normal.   Vitals reviewed.          Assessment/Plan   Diagnoses and all orders for this visit:    1. Essential hypertension (Primary)  -     Ambulatory Referral to Internal Medicine    2. Pre-eclampsia superimposed on chronic " hypertension, postpartum    3. Diabetes mellitus in pregnancy, postpartum condition  -     Ambulatory Referral to Internal Medicine    4. Postpartum care following  delivery    Other orders  -     metFORMIN ER (GLUCOPHAGE-XR) 500 MG 24 hr tablet; Take 1 tablet by mouth 2 (Two) Times a Day.  Dispense: 180 tablet; Refill: 0    1) Postpartum   Continue pelvic rest and no heavy lifting     2) Pre-eclampsia/Hypertension   Controlled on current regimen  Recheck in 2 weeks   Needs long term follow up with PCP     3) Pre-gestational DM  New diagnosis   Restart metformin  mg BID  Follow up with PCP for long term management.     [facility] warnings   Recheck in 2 weeks     Salvador Carson MD   2024  09:54 EDT

## 2024-04-30 ENCOUNTER — OFFICE VISIT (OUTPATIENT)
Dept: OBSTETRICS AND GYNECOLOGY | Facility: CLINIC | Age: 39
End: 2024-04-30
Payer: MEDICARE

## 2024-04-30 VITALS
SYSTOLIC BLOOD PRESSURE: 145 MMHG | HEIGHT: 64 IN | BODY MASS INDEX: 42.51 KG/M2 | DIASTOLIC BLOOD PRESSURE: 85 MMHG | HEART RATE: 79 BPM | WEIGHT: 249 LBS

## 2024-04-30 NOTE — PROGRESS NOTES
"Subjective    is a 39 y.o. female here for Post op exam, blood pressure check. She is s/p  on 2024 for GHTN. Pt denies any h/a, dizziness, or blurred vision.     Chief Complaint   Patient presents with    Post-op        HPI    39 y.o.    S/P RCD early for uncontrolled superimposed pre-eclampsia   Doing well.   Reports BP and BS okay   Reports baby good, but in NICU   Very tired from back and forth   Denies baby blues     Review of Systems   Constitutional:  Negative for fever.   Eyes:  Negative for visual disturbance.   Respiratory:  Negative for shortness of breath.    Cardiovascular:  Negative for chest pain.   Gastrointestinal:  Negative for abdominal pain.   Genitourinary:  Negative for hematuria.   Neurological:  Negative for headache.        Objective   /85   Pulse 79   Ht 162.6 cm (64\")   Wt 113 kg (249 lb)   Breastfeeding Yes   BMI 42.74 kg/m²   Physical Exam  Constitutional:       General: She is not in acute distress.     Appearance: Normal appearance. She is well-developed and normal weight.   Neck:      Thyroid: No thyromegaly.   Pulmonary:      Effort: No respiratory distress.   Abdominal:      General: Abdomen is flat. There is no distension.      Palpations: Abdomen is soft.      Tenderness: There is no abdominal tenderness. Guarding: incision - clean, dry and intact.   Musculoskeletal:      Right lower leg: No edema.      Left lower leg: No edema.   Neurological:      Mental Status: She is alert and oriented to person, place, and time.   Skin:     General: Skin is warm and dry.   Psychiatric:         Behavior: Behavior normal.         Thought Content: Thought content normal.         Judgment: Judgment normal.   Vitals reviewed.          Assessment/Plan   Diagnoses and all orders for this visit:    1. Pre-eclampsia superimposed on chronic hypertension, postpartum (Primary)    2. Diabetes mellitus in pregnancy, postpartum condition    3. Postpartum state    1) " Hypertension   On labetalol and procardia XL  BP okay today  No evidence of worsening (severe features)     2) DM   On metformin with good BS per patient   To need follow up with PCP for long term     3) Postpartum   Continue pelvic rest and no heavy lifting.     Salvador Carson MD   4/30/2024  10:15 EDT

## 2024-05-01 ENCOUNTER — OFFICE VISIT (OUTPATIENT)
Dept: FAMILY MEDICINE CLINIC | Facility: CLINIC | Age: 39
End: 2024-05-01
Payer: MEDICARE

## 2024-05-01 VITALS
SYSTOLIC BLOOD PRESSURE: 132 MMHG | HEIGHT: 64 IN | HEART RATE: 92 BPM | BODY MASS INDEX: 42.82 KG/M2 | OXYGEN SATURATION: 97 % | DIASTOLIC BLOOD PRESSURE: 84 MMHG | TEMPERATURE: 98.1 F | WEIGHT: 250.8 LBS

## 2024-05-01 DIAGNOSIS — Z98.891 STATUS POST CESAREAN DELIVERY: ICD-10-CM

## 2024-05-01 DIAGNOSIS — Z13.220 LIPID SCREENING: ICD-10-CM

## 2024-05-01 DIAGNOSIS — E11.9 TYPE 2 DIABETES MELLITUS WITHOUT COMPLICATION, WITHOUT LONG-TERM CURRENT USE OF INSULIN: ICD-10-CM

## 2024-05-01 DIAGNOSIS — Z00.00 ENCOUNTER FOR PREVENTIVE HEALTH EXAMINATION: ICD-10-CM

## 2024-05-01 DIAGNOSIS — Z76.89 ENCOUNTER TO ESTABLISH CARE: Primary | ICD-10-CM

## 2024-05-01 DIAGNOSIS — I10 PRIMARY HYPERTENSION: Chronic | ICD-10-CM

## 2024-05-01 RX ORDER — ELECTROLYTES/DEXTROSE
1 SOLUTION, ORAL ORAL DAILY
Qty: 90 TABLET | Refills: 3 | Status: SHIPPED | OUTPATIENT
Start: 2024-05-01 | End: 2025-04-26

## 2024-05-01 NOTE — ASSESSMENT & PLAN NOTE
Diabetes is stable.   Continue current treatment regimen.  Reminded to bring in blood sugar diary at next visit.  Recommended an ADA diet.  Recommended a Mediterranean style of eating  Regular aerobic exercise.  Discussed ways to avoid symptomatic hypoglycemia.  Discussed sick day management.  Discussed foot care.  Reminded to get yearly retinal exam.  Diabetes will be reassessed in 3 months  Patient currently on metformin per OB/GYN.    Discussed diabetes management and need for adequate BS control. Educated patient high A1c puts him/her at risk for MI, CVA, CKD, gastroparesis, foot ulcers, and frequent infections.   Pt informed of Rx for glucometer/test strips, and lancets as needed and explained to keep a blood sugar log. Return to office as instructed. Additionally diet compliance explained - avoid sugar candy, soda, high carbohydrate loads. Explained how losing weight can improve your health and achieve better blood sugar control. Being active can also help prevent or control the disorder. Recommended annual opthalmology follow -up due to diagnosis of diabetes. Annual Podiatry visit prn.

## 2024-05-01 NOTE — PROGRESS NOTES
"Chief Complaint  Establish Care (Pt gave birth 2 weeks ago. B/P has been running high. Gestational diabetes)    Subjective        Fany Brunson presents to [facility]  History of Present Illness  40yo WF Patient was previously  not seen by PCP at [facility], and patient is new to me and is here for establishment of care visit for chronic medical conditions including hypertension and diabetes/GDM.      Pt is  last delivery was on 24 via  and no other complications.  Pt last PCP was 2 years ago at [facility]. Pt currently only see OBGYN and had GDM & HTN.    Instructed patient to follow-up with an eye doctor, either opthalmology or optometry with preference for opthalmology, for an annual diabetic eye exam.      Instructed patient to get the adult preventive immunization that are due at  the pharmacy, including -pneumonia, hepatitis B vaccination series and COVID booster.  Pt s/p OBGYN yesterday and everything was fine; Will RTO on 24.      Objective   Vital Signs:  /84 (BP Location: Left arm, Patient Position: Sitting, Cuff Size: Adult)   Pulse 92   Temp 98.1 °F (36.7 °C) (Temporal)   Ht 162.6 cm (64.02\")   Wt 114 kg (250 lb 12.8 oz)   SpO2 97%   BMI 43.03 kg/m²   Estimated body mass index is 43.03 kg/m² as calculated from the following:    Height as of this encounter: 162.6 cm (64.02\").    Weight as of this encounter: 114 kg (250 lb 12.8 oz).       Class 3 Severe Obesity (BMI >=40). Obesity-related health conditions include the following: hypertension and diabetes mellitus. Obesity is newly identified. BMI is is above average; BMI management plan is completed. We discussed portion control and increasing exercise.      Physical Exam   Result Review :    The following data was reviewed by: DEAN Perez on 2024:  CMP          3/15/2024    12:15 2024    12:21 2024    06:00 "   CMP   Glucose 104  101  122    BUN 15  10  14    Creatinine 0.67  0.72  0.64    EGFR 114.2  109.2  115.5    Sodium 138  142  136    Potassium 3.5  4.0  3.8    Chloride 108  110  107    Calcium 9.1  9.4  8.9    Total Protein 6.4  6.5  6.7    Albumin 3.4  3.5  3.5    Globulin 3.0  3.0  3.2    Total Bilirubin 0.2  0.2  <0.2    Alkaline Phosphatase 116  142  150    AST (SGOT) 10  8  13    ALT (SGPT) 11  13  14    Albumin/Globulin Ratio 1.1  1.2  1.1    BUN/Creatinine Ratio 22.4  13.9  21.9    Anion Gap 12.0  12.8  14.7      CBC          4/11/2024    12:21 4/12/2024    06:00 4/13/2024    05:55   CBC   WBC 11.31  12.78  15.01    RBC 4.64  4.76  4.01    Hemoglobin 12.7  13.8  11.1    Hematocrit 40.0  41.0  34.5    MCV 86.2  86.1  86.0    MCH 27.4  29.0  27.7    MCHC 31.8  33.7  32.2    RDW 14.0  14.0  13.7    Platelets 276  308  238        TSH          11/7/2023    11:44   TSH   TSH 2.650      A1C Last 3 Results          11/7/2023    11:44 2/20/2024    10:09   HGBA1C Last 3 Results   Hemoglobin A1C 6.5  5.6        Data reviewed : Consultant notes reviewed last consult note by Dr. Carson             Assessment and Plan     Diagnoses and all orders for this visit:    1. Encounter to establish care (Primary)    2. Lipid screening  -     Lipid Panel    3. Encounter for preventive health examination    4. Primary hypertension  Assessment & Plan:  Hypertension is stable and controlled  Continue current treatment regimen.  Dietary sodium restriction.  Weight loss.  Ambulatory blood pressure monitoring.  Blood pressure will be reassessed in 3 months.  Discussed the importance of healthy diet, nutrition, and lifestyle. Recommend low salt, fat/cholesterol diet and avoid concentrated sweets. Encouraged DASH diet along with fresh fruits & vegetables and low fat dairy products. Counseled patient to exercise/walk as tolerated. Avoid tobacco and alcohol use.      Orders:  -     CBC & Differential  -     Comprehensive Metabolic Panel  -      Urinalysis With Microscopic - Urine, Clean Catch  -     TSH Rfx On Abnormal To Free T4    5. Status post  delivery    6. Type 2 diabetes mellitus without complication, without long-term current use of insulin  Assessment & Plan:  Diabetes is stable.   Continue current treatment regimen.  Reminded to bring in blood sugar diary at next visit.  Recommended an ADA diet.  Recommended a Mediterranean style of eating  Regular aerobic exercise.  Discussed ways to avoid symptomatic hypoglycemia.  Discussed sick day management.  Discussed foot care.  Reminded to get yearly retinal exam.  Diabetes will be reassessed in 3 months  Patient currently on metformin per OB/GYN.    Discussed diabetes management and need for adequate BS control. Educated patient high A1c puts him/her at risk for MI, CVA, CKD, gastroparesis, foot ulcers, and frequent infections.   Pt informed of Rx for glucometer/test strips, and lancets as needed and explained to keep a blood sugar log. Return to office as instructed. Additionally diet compliance explained - avoid sugar candy, soda, high carbohydrate loads. Explained how losing weight can improve your health and achieve better blood sugar control. Being active can also help prevent or control the disorder. Recommended annual opthalmology follow -up due to diagnosis of diabetes. Annual Podiatry visit prn.        Other orders  -     multivitamin with minerals (Multivitamin Adult) tablet tablet; Take 1 tablet by mouth Daily for 360 days.  Dispense: 90 tablet; Refill: 3      All chronic conditions have been addressed and treated by the practice or other specialists. Medications have been reconciled and refilled as appropriate. Reiterated compliance and timely follow up appointments. Side effects of all new and old medications reviewed with the patient and patient willing to accept all risks involved. Advised RTO if no improvement or worsening of symptoms or if any new complaints arise. Patient  advised to follow up with clinic or call after diagnostic tests, if patient does not hear from office 3 days after the test completion.            Follow Up     Return in about 3 months (around 8/1/2024) for Next scheduled follow up.  Patient was given instructions and counseling regarding her condition or for health maintenance advice. Please see specific information pulled into the AVS if appropriate.

## 2024-05-02 LAB
ALBUMIN SERPL-MCNC: 4.3 G/DL (ref 3.9–4.9)
ALBUMIN/GLOB SERPL: 1.6 {RATIO} (ref 1.2–2.2)
ALP SERPL-CCNC: 114 IU/L (ref 44–121)
ALT SERPL-CCNC: 21 IU/L (ref 0–32)
APPEARANCE UR: CLEAR
AST SERPL-CCNC: 22 IU/L (ref 0–40)
BACTERIA #/AREA URNS HPF: NORMAL /[HPF]
BASOPHILS # BLD AUTO: 0.1 X10E3/UL (ref 0–0.2)
BASOPHILS NFR BLD AUTO: 1 %
BILIRUB SERPL-MCNC: 0.3 MG/DL (ref 0–1.2)
BILIRUB UR QL STRIP: NEGATIVE
BUN SERPL-MCNC: 14 MG/DL (ref 6–20)
BUN/CREAT SERPL: 17 (ref 9–23)
CALCIUM SERPL-MCNC: 9.7 MG/DL (ref 8.7–10.2)
CASTS URNS QL MICRO: NORMAL /LPF
CHLORIDE SERPL-SCNC: 102 MMOL/L (ref 96–106)
CHOLEST SERPL-MCNC: 228 MG/DL (ref 100–199)
CO2 SERPL-SCNC: 22 MMOL/L (ref 20–29)
COLOR UR: YELLOW
CREAT SERPL-MCNC: 0.81 MG/DL (ref 0.57–1)
EGFRCR SERPLBLD CKD-EPI 2021: 95 ML/MIN/1.73
EOSINOPHIL # BLD AUTO: 0.4 X10E3/UL (ref 0–0.4)
EOSINOPHIL NFR BLD AUTO: 4 %
EPI CELLS #/AREA URNS HPF: NORMAL /HPF (ref 0–10)
ERYTHROCYTE [DISTWIDTH] IN BLOOD BY AUTOMATED COUNT: 13 % (ref 11.7–15.4)
GLOBULIN SER CALC-MCNC: 2.7 G/DL (ref 1.5–4.5)
GLUCOSE SERPL-MCNC: 101 MG/DL (ref 70–99)
GLUCOSE UR QL STRIP: NEGATIVE
HCT VFR BLD AUTO: 40.8 % (ref 34–46.6)
HDLC SERPL-MCNC: 48 MG/DL
HGB BLD-MCNC: 13.2 G/DL (ref 11.1–15.9)
HGB UR QL STRIP: NEGATIVE
IMM GRANULOCYTES # BLD AUTO: 0.1 X10E3/UL (ref 0–0.1)
IMM GRANULOCYTES NFR BLD AUTO: 1 %
KETONES UR QL STRIP: NEGATIVE
LDLC SERPL CALC-MCNC: 139 MG/DL (ref 0–99)
LEUKOCYTE ESTERASE UR QL STRIP: NEGATIVE
LYMPHOCYTES # BLD AUTO: 2.4 X10E3/UL (ref 0.7–3.1)
LYMPHOCYTES NFR BLD AUTO: 26 %
MCH RBC QN AUTO: 28 PG (ref 26.6–33)
MCHC RBC AUTO-ENTMCNC: 32.4 G/DL (ref 31.5–35.7)
MCV RBC AUTO: 86 FL (ref 79–97)
MICRO URNS: NORMAL
MICRO URNS: NORMAL
MONOCYTES # BLD AUTO: 0.4 X10E3/UL (ref 0.1–0.9)
MONOCYTES NFR BLD AUTO: 4 %
NEUTROPHILS # BLD AUTO: 6.1 X10E3/UL (ref 1.4–7)
NEUTROPHILS NFR BLD AUTO: 64 %
NITRITE UR QL STRIP: NEGATIVE
PH UR STRIP: 6.5 [PH] (ref 5–7.5)
PLATELET # BLD AUTO: 396 X10E3/UL (ref 150–450)
POTASSIUM SERPL-SCNC: 4.4 MMOL/L (ref 3.5–5.2)
PROT SERPL-MCNC: 7 G/DL (ref 6–8.5)
PROT UR QL STRIP: NEGATIVE
RBC # BLD AUTO: 4.72 X10E6/UL (ref 3.77–5.28)
RBC #/AREA URNS HPF: NORMAL /HPF (ref 0–2)
SODIUM SERPL-SCNC: 141 MMOL/L (ref 134–144)
SP GR UR STRIP: 1.01 (ref 1–1.03)
TRIGL SERPL-MCNC: 227 MG/DL (ref 0–149)
TSH SERPL DL<=0.005 MIU/L-ACNC: 1.5 UIU/ML (ref 0.45–4.5)
UROBILINOGEN UR STRIP-MCNC: 0.2 MG/DL (ref 0.2–1)
VLDLC SERPL CALC-MCNC: 41 MG/DL (ref 5–40)
WBC # BLD AUTO: 9.4 X10E3/UL (ref 3.4–10.8)
WBC #/AREA URNS HPF: NORMAL /HPF (ref 0–5)

## 2024-05-03 DIAGNOSIS — E78.1 HYPERTRIGLYCERIDEMIA: Primary | ICD-10-CM

## 2024-05-03 RX ORDER — OMEGA-3/DHA/EPA/FISH OIL 300-1000MG
1 CAPSULE ORAL DAILY
Qty: 180 CAPSULE | Refills: 3 | Status: SHIPPED | OUTPATIENT
Start: 2024-05-03

## 2024-05-28 ENCOUNTER — OFFICE VISIT (OUTPATIENT)
Dept: OBSTETRICS AND GYNECOLOGY | Facility: CLINIC | Age: 39
End: 2024-05-28
Payer: MEDICARE

## 2024-05-28 VITALS
HEIGHT: 64 IN | DIASTOLIC BLOOD PRESSURE: 77 MMHG | WEIGHT: 254 LBS | BODY MASS INDEX: 43.36 KG/M2 | SYSTOLIC BLOOD PRESSURE: 124 MMHG | HEART RATE: 85 BPM

## 2024-05-28 PROCEDURE — 99459 PELVIC EXAMINATION: CPT | Performed by: OBSTETRICS & GYNECOLOGY

## 2024-05-28 PROCEDURE — 99213 OFFICE O/P EST LOW 20 MIN: CPT | Performed by: OBSTETRICS & GYNECOLOGY

## 2024-05-28 PROCEDURE — 1160F RVW MEDS BY RX/DR IN RCRD: CPT | Performed by: OBSTETRICS & GYNECOLOGY

## 2024-05-28 PROCEDURE — 3074F SYST BP LT 130 MM HG: CPT | Performed by: OBSTETRICS & GYNECOLOGY

## 2024-05-28 PROCEDURE — 1159F MED LIST DOCD IN RCRD: CPT | Performed by: OBSTETRICS & GYNECOLOGY

## 2024-05-28 PROCEDURE — 3078F DIAST BP <80 MM HG: CPT | Performed by: OBSTETRICS & GYNECOLOGY

## 2024-05-28 RX ORDER — OMEGA-3-ACID ETHYL ESTERS 1 G/1
CAPSULE, LIQUID FILLED ORAL
COMMUNITY
Start: 2024-05-03

## 2024-05-28 NOTE — PROGRESS NOTES
"Here for Postpartum Check     HPI    39 y.o.  - Delivered 2024, now at 6 weeks postpartum for follow up.   Complications of pregnancy/delivery/postpartum : , GHTN, GDM   Breastfeeding/Bottlefeeding : Bottle  Baby Blues: denies   Contraception : declines.   Last pap : 2023 NIL HPV neg    Complaints: denies    Review of Systems   Constitutional:  Negative for chills and fever.   Gastrointestinal:  Negative for abdominal pain.   Genitourinary:  Negative for dysuria, menstrual problem, pelvic pain, vaginal bleeding and vaginal discharge.   All other systems reviewed and are negative.      /77   Pulse 85   Ht 162.6 cm (64\")   Wt 115 kg (254 lb)   Breastfeeding No   BMI 43.60 kg/m²     Physical Exam  Constitutional:       General: She is not in acute distress.     Appearance: She is well-developed. She is obese.   Genitourinary:      Vulva normal.      Right Labia: No lesions or Bartholin's cyst.     Left Labia: No lesions or Bartholin's cyst.     No inguinal adenopathy present in the right or left side.     No vaginal discharge or bleeding.        Right Adnexa: not tender, not full and no mass present.     Left Adnexa: not tender, not full and no mass present.     No cervical motion tenderness or friability.      Uterus is not enlarged or tender.      No uterine mass detected.     Uterus is anteverted.   Breasts:     Right: No inverted nipple, mass or nipple discharge.      Left: No inverted nipple, mass or nipple discharge.   HENT:      Head: Normocephalic and atraumatic.      Nose: Nose normal.   Eyes:      Conjunctiva/sclera: Conjunctivae normal.      Pupils: Pupils are equal, round, and reactive to light.   Neck:      Thyroid: No thyromegaly.   Cardiovascular:      Rate and Rhythm: Normal rate and regular rhythm.      Heart sounds: Normal heart sounds. No murmur heard.  Pulmonary:      Effort: Pulmonary effort is normal. No respiratory distress.      Breath sounds: Normal breath " sounds.   Abdominal:      General: Abdomen is flat. There is no distension.      Palpations: Abdomen is soft.      Tenderness: There is no abdominal tenderness.   Musculoskeletal:         General: No deformity. Normal range of motion.      Cervical back: Normal range of motion and neck supple.      Right lower leg: No edema.      Left lower leg: No edema.   Lymphadenopathy:      Lower Body: No right inguinal adenopathy. No left inguinal adenopathy.   Neurological:      Mental Status: She is alert and oriented to person, place, and time.   Skin:     General: Skin is warm and dry.      Findings: No erythema.   Psychiatric:         Behavior: Behavior normal.         Thought Content: Thought content normal.         Judgment: Judgment normal.   Vitals reviewed. Exam conducted with a chaperone present.            Assessment/plan   Diagnoses and all orders for this visit:    1. Postpartum state (Primary)    2. Diabetes mellitus in pregnancy, postpartum condition    3. Benign essential hypertension, postpartum      1) postpartum   Released from restrictions   Declined contraception   Recommendation : 150 min/week of moderate intensity aerobic activity     2) Type II DM   On metformin and following with PCP     3) Chronic Hypertension   Superimposed pre-eclampsia resolved  On Labetalol and procardia   BP good today   Defer to PCP to update or change medications     Salvador Carson MD   5/28/2024  12:23 EDT

## 2024-05-30 ENCOUNTER — SPECIALTY PHARMACY (OUTPATIENT)
Dept: ENDOCRINOLOGY | Age: 39
End: 2024-05-30
Payer: MEDICARE

## 2024-05-30 NOTE — PROGRESS NOTES
Specialty Pharmacy Patient Management Program  Program Disenrollment      Fany Brunson is a 39 y.o. female seen by a [facility] provider. Patient was previously enrolled in the Endocrinology Patient Management program offered by [facility].      Disenrolling patient today as patient is no longer being managed by [facility]. Patient states she is no longer needing our services. Patient is aware she can continue to use [facility] [place] by [facility] pharmacy for medication needs.     Minerva Chin PharmD, MM   5/30/2024  16:16 EDT

## 2024-06-24 NOTE — TELEPHONE ENCOUNTER
----- Message from Salvador Carson MD sent at 12/7/2023 12:17 PM EST -----  Hilary, please let her know the vaginal culture STD screen for Chlamydia, Gonorrhea and trichomoniasis is negative. Thanks, Dr. Carson   Render Risk Assessment In Note?: no Detail Level: Simple Comment: 6/2021 original biopsy\\n12/2023 clinically c/w scar, no evidence recurrence/change..will continue to monitor\\n6/24/24 offered patient mohs vs radiation, pt declined treatment at this time. Would like to continue to monitor.

## 2024-07-22 RX ORDER — METFORMIN HYDROCHLORIDE 500 MG/1
500 TABLET, EXTENDED RELEASE ORAL 2 TIMES DAILY
Qty: 180 TABLET | Refills: 0 | Status: SHIPPED | OUTPATIENT
Start: 2024-07-22

## 2024-07-22 RX ORDER — LABETALOL 300 MG/1
300 TABLET, FILM COATED ORAL EVERY 8 HOURS SCHEDULED
Qty: 90 TABLET | Refills: 0 | Status: SHIPPED | OUTPATIENT
Start: 2024-07-22

## 2024-07-22 RX ORDER — NIFEDIPINE 90 MG/1
90 TABLET, EXTENDED RELEASE ORAL EVERY 12 HOURS
Qty: 60 TABLET | Refills: 0 | Status: SHIPPED | OUTPATIENT
Start: 2024-07-22

## 2024-08-13 ENCOUNTER — OFFICE VISIT (OUTPATIENT)
Dept: FAMILY MEDICINE CLINIC | Facility: CLINIC | Age: 39
End: 2024-08-13
Payer: MEDICARE

## 2024-08-13 VITALS
HEART RATE: 84 BPM | HEIGHT: 64 IN | DIASTOLIC BLOOD PRESSURE: 68 MMHG | TEMPERATURE: 98 F | BODY MASS INDEX: 43.26 KG/M2 | SYSTOLIC BLOOD PRESSURE: 110 MMHG | WEIGHT: 253.4 LBS | OXYGEN SATURATION: 97 %

## 2024-08-13 DIAGNOSIS — Z00.00 ANNUAL PHYSICAL EXAM: Primary | ICD-10-CM

## 2024-08-13 DIAGNOSIS — E78.1 HYPERTRIGLYCERIDEMIA: ICD-10-CM

## 2024-08-13 DIAGNOSIS — E11.9 TYPE 2 DIABETES MELLITUS WITHOUT COMPLICATION, WITHOUT LONG-TERM CURRENT USE OF INSULIN: Chronic | ICD-10-CM

## 2024-08-13 DIAGNOSIS — I10 PRIMARY HYPERTENSION: Chronic | ICD-10-CM

## 2024-08-13 PROBLEM — Z98.891 STATUS POST CESAREAN DELIVERY: Status: RESOLVED | Noted: 2024-04-14 | Resolved: 2024-08-13

## 2024-08-13 PROBLEM — Z13.220 LIPID SCREENING: Status: RESOLVED | Noted: 2024-05-01 | Resolved: 2024-08-13

## 2024-08-13 PROBLEM — O28.3 ABNORMAL ANTENATAL ULTRASOUND: Status: RESOLVED | Noted: 2024-04-11 | Resolved: 2024-08-13

## 2024-08-13 PROBLEM — O16.9 HYPERTENSION AFFECTING PREGNANCY: Status: RESOLVED | Noted: 2024-03-15 | Resolved: 2024-08-13

## 2024-08-13 PROBLEM — Z76.89 ENCOUNTER TO ESTABLISH CARE: Status: RESOLVED | Noted: 2024-05-01 | Resolved: 2024-08-13

## 2024-08-13 RX ORDER — LABETALOL 300 MG/1
300 TABLET, FILM COATED ORAL EVERY 8 HOURS SCHEDULED
Qty: 90 TABLET | Refills: 0 | Status: SHIPPED | OUTPATIENT
Start: 2024-08-13

## 2024-08-13 RX ORDER — SWAB
1 SWAB, NON-MEDICATED MISCELLANEOUS DAILY
Qty: 90 EACH | Refills: 3 | Status: SHIPPED | OUTPATIENT
Start: 2024-08-13

## 2024-08-13 RX ORDER — NIFEDIPINE 90 MG/1
90 TABLET, EXTENDED RELEASE ORAL EVERY 12 HOURS
Qty: 180 TABLET | Refills: 1 | Status: SHIPPED | OUTPATIENT
Start: 2024-08-13

## 2024-08-13 RX ORDER — METFORMIN HYDROCHLORIDE 500 MG/1
500 TABLET, EXTENDED RELEASE ORAL 2 TIMES DAILY
Qty: 180 TABLET | Refills: 0 | Status: SHIPPED | OUTPATIENT
Start: 2024-08-13

## 2024-08-13 RX ORDER — ELECTROLYTES/DEXTROSE
1 SOLUTION, ORAL ORAL DAILY
Qty: 90 TABLET | Refills: 3 | Status: SHIPPED | OUTPATIENT
Start: 2024-08-13 | End: 2025-08-08

## 2024-08-13 NOTE — PROGRESS NOTES
"Chief Complaint  Hypertension    Subjective        Fany Brunson presents to [facility]  History of Present Illness  39-year-old white female with a history of hypertension and diabetes here for chronic disease management follow-up visit.    Discussed Procardia XL 90 mg twice daily dose being more than the recommended dose and need to readjust blood pressure medication regimen by adding additional medicine and lowering the dose of Procardia.  However patient reports her blood pressure is well-controlled on the current regimen and does not want any changes in her hypertension medication regimen at this time    Instructed patient to follow-up with an eye doctor, either opthalmology or optometry with preference for opthalmology, for an annual diabetic eye exam.  Instructed patient to get the adult preventive immunization that are due at  the pharmacy, including -pneumonia vaccine, HPV vaccine.    Denied TOB or ETOH use currently. Quit TOB 12 years ago.  Discussed obesity and wt trend.  Discussed the importance of healthy diet, nutrition, and lifestyle. Recommend low salt, fat/cholesterol diet and avoid concentrated sweets. Encouraged DASH diet along with fresh fruits & vegetables and low fat dairy products. Counseled patient to exercise/walk as tolerated. Avoid tobacco and alcohol use.  Also counseled patient on increasing body weight and the need to lose weight to prevent uncontrolled hypertension and diabetes.    Inquired regarding sleep apnea symptoms however patient states she is unaware or denies snoring.  Patient states she does not have any excessive fatigue.  Patient refused sleep medicine referral.  Hypertension        Objective   Vital Signs:  /68   Pulse 84   Temp 98 °F (36.7 °C) (Temporal)   Ht 162.6 cm (64.02\")   Wt 115 kg (253 lb 6.4 oz)   SpO2 97%   BMI 43.47 kg/m²   Estimated body mass index is 43.47 kg/m² as calculated from the following:    Height as of " "this encounter: 162.6 cm (64.02\").    Weight as of this encounter: 115 kg (253 lb 6.4 oz).               Physical Exam  Constitutional:       Appearance: Normal appearance. She is obese.   HENT:      Head: Normocephalic and atraumatic.   Eyes:      Conjunctiva/sclera: Conjunctivae normal.   Cardiovascular:      Rate and Rhythm: Normal rate and regular rhythm.      Heart sounds: Normal heart sounds.   Pulmonary:      Effort: Pulmonary effort is normal.      Breath sounds: Normal breath sounds.   Abdominal:      General: Bowel sounds are normal.      Palpations: Abdomen is soft.      Comments: Non-tender   Skin:     General: Skin is warm.   Neurological:      General: No focal deficit present.      Mental Status: She is alert and oriented to person, place, and time.   Psychiatric:         Mood and Affect: Mood normal.         Behavior: Behavior normal.        Result Review :    The following data was reviewed by: DEAN Perez on 08/13/2024:  CMP          4/11/2024    12:21 4/12/2024    06:00 5/1/2024    14:17   CMP   Glucose 101  122  101    BUN 10  14  14    Creatinine 0.72  0.64  0.81    EGFR 109.2  115.5     Sodium 142  136  141    Potassium 4.0  3.8  4.4    Chloride 110  107  102    Calcium 9.4  8.9  9.7    Total Protein   7.0    Total Protein 6.5  6.7     Albumin 3.5  3.5  4.3    Globulin   2.7    Globulin 3.0  3.2     Total Bilirubin 0.2  <0.2  0.3    Alkaline Phosphatase 142  150  114    AST (SGOT) 8  13  22    ALT (SGPT) 13  14  21    Albumin/Globulin Ratio 1.2  1.1     BUN/Creatinine Ratio 13.9  21.9  17    Anion Gap 12.8  14.7       CBC          4/12/2024    06:00 4/13/2024    05:55 5/1/2024    14:17   CBC   WBC 12.78  15.01  9.4    RBC 4.76  4.01  4.72    Hemoglobin 13.8  11.1  13.2    Hematocrit 41.0  34.5  40.8    MCV 86.1  86.0  86    MCH 29.0  27.7  28.0    MCHC 33.7  32.2  32.4    RDW 14.0  13.7  13.0    Platelets 308  238  396      Lipid Panel          5/1/2024    14:17   Lipid Panel "   Total Cholesterol 228    Triglycerides 227    HDL Cholesterol 48    VLDL Cholesterol 41    LDL Cholesterol  139      TSH          11/7/2023    11:44 5/1/2024    14:17   TSH   TSH 2.650  1.500      A1C Last 3 Results          11/7/2023    11:44 2/20/2024    10:09   HGBA1C Last 3 Results   Hemoglobin A1C 6.5  5.6          Data reviewed : Consultant notes reviewed last OB/GYN consult note for postpartum visit with Dr. Carson in May 2024.             Assessment and Plan     Diagnoses and all orders for this visit:    1. Annual physical exam (Primary)  Assessment & Plan:  Counseling was provided on nutrition, physical activity, development, and injury prevention, dental health, and safe sex practices patient verbalizes understanding no additional questions were asked.  Also counseled patient to always take a prenatal multivitamin in case she gets pregnant again.        2. Primary hypertension  Assessment & Plan:  Hypertension is stable and controlled  Continue current treatment regimen.  Dietary sodium restriction.  Weight loss.  Regular aerobic exercise.  Ambulatory blood pressure monitoring.  Blood pressure will be reassessed in 3 months.  Patient agreeable to cardiology referral for further evaluation of hypertension regimen.  Discussed the importance of healthy diet, nutrition, and lifestyle. Recommend low salt, fat/cholesterol diet and avoid concentrated sweets. Encouraged DASH diet along with fresh fruits & vegetables and low fat dairy products. Counseled patient to exercise/walk as tolerated. Avoid tobacco and alcohol use.      Orders:  -     CBC & Differential  -     Comprehensive Metabolic Panel  -     labetalol (NORMODYNE) 300 MG tablet; Take 1 tablet by mouth Every 8 (Eight) Hours.  Dispense: 90 tablet; Refill: 0  -     NIFEdipine XL (PROCARDIA XL) 90 MG 24 hr tablet; Take 1 tablet by mouth Every 12 (Twelve) Hours.  Dispense: 180 tablet; Refill: 1  -     Ambulatory Referral to Cardiology    3. Type 2  diabetes mellitus without complication, without long-term current use of insulin  Assessment & Plan:  Diabetes is improving with treatment.   Continue current treatment regimen.  Reminded to bring in blood sugar diary at next visit.  Recommended an ADA diet.  Recommended a Mediterranean style of eating  Regular aerobic exercise.  Discussed ways to avoid symptomatic hypoglycemia.  Discussed sick day management.  Discussed foot care.  Reminded to get yearly retinal exam.  Diabetes will be reassessed in 3 months    Discussed diabetes management and need for adequate BS control. Educated patient high A1c puts him/her at risk for MI, CVA, CKD, gastroparesis, foot ulcers, and frequent infections.   Pt informed of Rx for glucometer/test strips, and lancets as needed and explained to keep a blood sugar log. Return to office as instructed. Additionally diet compliance explained - avoid sugar candy, soda, high carbohydrate loads. Explained how losing weight can improve your health and achieve better blood sugar control. Being active can also help prevent or control the disorder. Recommended annual opthalmology follow -up due to diagnosis of diabetes. Annual Podiatry visit prn.      Orders:  -     CBC & Differential  -     Comprehensive Metabolic Panel  -     Lipid Panel  -     Hemoglobin A1c  -     MicroAlbumin, Urine, Random - Urine, Clean Catch  -     metFORMIN ER (GLUCOPHAGE-XR) 500 MG 24 hr tablet; Take 1 tablet by mouth 2 (Two) Times a Day.  Dispense: 180 tablet; Refill: 0    4. Hypertriglyceridemia  Assessment & Plan:       Orders:  -     Lipid Panel    Other orders  -     multivitamin with minerals (Multivitamin Adult) tablet tablet; Take 1 tablet by mouth Daily for 360 days.  Dispense: 90 tablet; Refill: 3  -     Prenatal 28-0.8 MG tablet; Take 1 tablet by mouth Daily.  Dispense: 90 each; Refill: 3             Follow Up     Return in about 3 months (around 11/13/2024) for Next scheduled follow up.  Patient was given  instructions and counseling regarding her condition or for health maintenance advice. Please see specific information pulled into the AVS if appropriate.

## 2024-08-13 NOTE — ASSESSMENT & PLAN NOTE
Diabetes is improving with treatment.   Continue current treatment regimen.  Reminded to bring in blood sugar diary at next visit.  Recommended an ADA diet.  Recommended a Mediterranean style of eating  Regular aerobic exercise.  Discussed ways to avoid symptomatic hypoglycemia.  Discussed sick day management.  Discussed foot care.  Reminded to get yearly retinal exam.  Diabetes will be reassessed in 3 months    Discussed diabetes management and need for adequate BS control. Educated patient high A1c puts him/her at risk for MI, CVA, CKD, gastroparesis, foot ulcers, and frequent infections.   Pt informed of Rx for glucometer/test strips, and lancets as needed and explained to keep a blood sugar log. Return to office as instructed. Additionally diet compliance explained - avoid sugar candy, soda, high carbohydrate loads. Explained how losing weight can improve your health and achieve better blood sugar control. Being active can also help prevent or control the disorder. Recommended annual opthalmology follow -up due to diagnosis of diabetes. Annual Podiatry visit prn.

## 2024-08-13 NOTE — ASSESSMENT & PLAN NOTE
Hypertension is stable and controlled  Continue current treatment regimen.  Dietary sodium restriction.  Weight loss.  Regular aerobic exercise.  Ambulatory blood pressure monitoring.  Blood pressure will be reassessed in 3 months.  Patient agreeable to cardiology referral for further evaluation of hypertension regimen.  Discussed the importance of healthy diet, nutrition, and lifestyle. Recommend low salt, fat/cholesterol diet and avoid concentrated sweets. Encouraged DASH diet along with fresh fruits & vegetables and low fat dairy products. Counseled patient to exercise/walk as tolerated. Avoid tobacco and alcohol use.

## 2024-08-13 NOTE — ASSESSMENT & PLAN NOTE
Counseling was provided on nutrition, physical activity, development, and injury prevention, dental health, and safe sex practices patient verbalizes understanding no additional questions were asked.  Also counseled patient to always take a prenatal multivitamin in case she gets pregnant again.

## 2024-08-14 LAB
ALBUMIN SERPL-MCNC: 4.1 G/DL (ref 3.9–4.9)
ALP SERPL-CCNC: 77 IU/L (ref 44–121)
ALT SERPL-CCNC: 15 IU/L (ref 0–32)
AST SERPL-CCNC: 13 IU/L (ref 0–40)
BASOPHILS # BLD AUTO: 0.1 X10E3/UL (ref 0–0.2)
BASOPHILS NFR BLD AUTO: 1 %
BILIRUB SERPL-MCNC: <0.2 MG/DL (ref 0–1.2)
BUN SERPL-MCNC: 11 MG/DL (ref 6–20)
BUN/CREAT SERPL: 16 (ref 9–23)
CALCIUM SERPL-MCNC: 9.3 MG/DL (ref 8.7–10.2)
CHLORIDE SERPL-SCNC: 105 MMOL/L (ref 96–106)
CHOLEST SERPL-MCNC: 170 MG/DL (ref 100–199)
CO2 SERPL-SCNC: 20 MMOL/L (ref 20–29)
CREAT SERPL-MCNC: 0.67 MG/DL (ref 0.57–1)
EGFRCR SERPLBLD CKD-EPI 2021: 114 ML/MIN/1.73
EOSINOPHIL # BLD AUTO: 0.5 X10E3/UL (ref 0–0.4)
EOSINOPHIL NFR BLD AUTO: 5 %
ERYTHROCYTE [DISTWIDTH] IN BLOOD BY AUTOMATED COUNT: 13.5 % (ref 11.7–15.4)
GLOBULIN SER CALC-MCNC: 2.7 G/DL (ref 1.5–4.5)
GLUCOSE SERPL-MCNC: 145 MG/DL (ref 70–99)
HBA1C MFR BLD: 6.1 % (ref 4.8–5.6)
HCT VFR BLD AUTO: 40.6 % (ref 34–46.6)
HDLC SERPL-MCNC: 34 MG/DL
HGB BLD-MCNC: 13 G/DL (ref 11.1–15.9)
IMM GRANULOCYTES # BLD AUTO: 0 X10E3/UL (ref 0–0.1)
IMM GRANULOCYTES NFR BLD AUTO: 0 %
LDLC SERPL CALC-MCNC: 93 MG/DL (ref 0–99)
LYMPHOCYTES # BLD AUTO: 3 X10E3/UL (ref 0.7–3.1)
LYMPHOCYTES NFR BLD AUTO: 32 %
MCH RBC QN AUTO: 27.3 PG (ref 26.6–33)
MCHC RBC AUTO-ENTMCNC: 32 G/DL (ref 31.5–35.7)
MCV RBC AUTO: 85 FL (ref 79–97)
MICROALBUMIN UR-MCNC: 20.6 UG/ML
MONOCYTES # BLD AUTO: 0.5 X10E3/UL (ref 0.1–0.9)
MONOCYTES NFR BLD AUTO: 5 %
NEUTROPHILS # BLD AUTO: 5.1 X10E3/UL (ref 1.4–7)
NEUTROPHILS NFR BLD AUTO: 57 %
PLATELET # BLD AUTO: 351 X10E3/UL (ref 150–450)
POTASSIUM SERPL-SCNC: 3.9 MMOL/L (ref 3.5–5.2)
PROT SERPL-MCNC: 6.8 G/DL (ref 6–8.5)
RBC # BLD AUTO: 4.77 X10E6/UL (ref 3.77–5.28)
SODIUM SERPL-SCNC: 141 MMOL/L (ref 134–144)
TRIGL SERPL-MCNC: 256 MG/DL (ref 0–149)
VLDLC SERPL CALC-MCNC: 43 MG/DL (ref 5–40)
WBC # BLD AUTO: 9.2 X10E3/UL (ref 3.4–10.8)

## 2024-08-16 RX ORDER — FENOFIBRATE 145 MG/1
145 TABLET, COATED ORAL DAILY
Qty: 30 TABLET | Refills: 5 | Status: SHIPPED | OUTPATIENT
Start: 2024-08-16

## 2024-08-23 DIAGNOSIS — I10 PRIMARY HYPERTENSION: Chronic | ICD-10-CM

## 2024-08-23 RX ORDER — NIFEDIPINE 90 MG/1
90 TABLET, EXTENDED RELEASE ORAL EVERY 12 HOURS
Qty: 60 TABLET | Refills: 2 | Status: SHIPPED | OUTPATIENT
Start: 2024-08-23

## 2024-08-28 ENCOUNTER — OFFICE VISIT (OUTPATIENT)
Dept: CARDIOLOGY | Facility: CLINIC | Age: 39
End: 2024-08-28
Payer: MEDICARE

## 2024-08-28 VITALS
HEIGHT: 64 IN | WEIGHT: 249.8 LBS | DIASTOLIC BLOOD PRESSURE: 78 MMHG | SYSTOLIC BLOOD PRESSURE: 120 MMHG | BODY MASS INDEX: 42.65 KG/M2 | HEART RATE: 87 BPM

## 2024-08-28 DIAGNOSIS — E66.2 CLASS 3 OBESITY WITH ALVEOLAR HYPOVENTILATION WITHOUT SERIOUS COMORBIDITY WITH BODY MASS INDEX (BMI) OF 40.0 TO 44.9 IN ADULT: ICD-10-CM

## 2024-08-28 DIAGNOSIS — I10 ESSENTIAL HYPERTENSION: Primary | ICD-10-CM

## 2024-08-28 PROCEDURE — 1159F MED LIST DOCD IN RCRD: CPT | Performed by: INTERNAL MEDICINE

## 2024-08-28 PROCEDURE — 3078F DIAST BP <80 MM HG: CPT | Performed by: INTERNAL MEDICINE

## 2024-08-28 PROCEDURE — 99204 OFFICE O/P NEW MOD 45 MIN: CPT | Performed by: INTERNAL MEDICINE

## 2024-08-28 PROCEDURE — 1160F RVW MEDS BY RX/DR IN RCRD: CPT | Performed by: INTERNAL MEDICINE

## 2024-08-28 PROCEDURE — 93000 ELECTROCARDIOGRAM COMPLETE: CPT | Performed by: INTERNAL MEDICINE

## 2024-08-28 PROCEDURE — 3074F SYST BP LT 130 MM HG: CPT | Performed by: INTERNAL MEDICINE

## 2024-08-28 NOTE — PROGRESS NOTES
[facility]      Patient Name: Fany Brunson  :1985  Age: 39 y.o.  Encounter Provider:  Ar Chicas Jr, MD      Chief Complaint:   Chief Complaint   Patient presents with    Hypertension         Hypertension  Pertinent negatives include no chest pain, orthopnea, palpitations or PND.   Fany Brunson is a 39 y.o. female with longstanding history of hypertension, obesity who presents for initial evaluation of hypertension.  Patient was diagnosed with hypertension after her fourth pregnancy about 8 years ago.  She was placed on medication and was doing fairly well with this until about 2 years ago when she quit all medications for unknown reasons.  She went through her fifth and sixth pregnancies which were uneventful but on this last pregnancy she was diagnosed with preeclampsia at 32 weeks and had an urgent delivery.  When she was evaluated for the pregnancy in 2023 she was noted to have very high blood pressure.  She was placed on nifedipine 90 mg without good control after 6 weeks and this was increased to twice daily.  Still did not have optimal control after another 6 weeks and labetalol was added.  She has remained on this medication since delivery and today in clinic blood pressure is 120/78 mmHg.  She was completely asymptomatic even with the blood pressure of 210/100 that was noted last year when medications were started.  She states she has fair functional capacity with no chest pain or shortness of air.  No orthopnea or PND.  She has chronic stable lower extremity edema.  No palpitations, dizziness or syncope.  She has experienced gestational diabetes with the last 3 pregnancies and states that her latest A1c was 6.1 off medications.  No family history of coronary artery disease.  She quit smoking 12 years ago.  She denies alcohol or illicit drug use.  She denies hypersomnia or snoring which is corroborated by her  who is in clinic with her today.  No  "cardiac complaints at time of interview.      The following portions of the patient's history were reviewed and updated as appropriate: allergies, current medications, past family history, past medical history, past social history, past surgical history and problem list.      Review of Systems   Constitutional: Negative for chills and fever.   HENT:  Negative for hoarse voice and sore throat.    Eyes:  Negative for double vision and photophobia.   Cardiovascular:  Negative for chest pain, leg swelling, near-syncope, orthopnea, palpitations, paroxysmal nocturnal dyspnea and syncope.   Respiratory:  Negative for cough and wheezing.    Skin:  Negative for poor wound healing and rash.   Musculoskeletal:  Negative for arthritis and joint swelling.   Gastrointestinal:  Negative for bloating, abdominal pain, hematemesis and hematochezia.   Neurological:  Negative for dizziness and focal weakness.   Psychiatric/Behavioral:  Negative for depression and suicidal ideas.      OBJECTIVE:   Vital Signs  Vitals:    08/28/24 0942   BP: 120/78   Pulse: 87     Estimated body mass index is 42.88 kg/m² as calculated from the following:    Height as of this encounter: 162.6 cm (64\").    Weight as of this encounter: 113 kg (249 lb 12.8 oz).    Vitals reviewed.   Constitutional:       Appearance: Healthy appearance. Not in distress.   Neck:      Vascular: No JVR. JVD normal.   Pulmonary:      Effort: Pulmonary effort is normal.      Breath sounds: Normal breath sounds. No wheezing. No rhonchi. No rales.   Chest:      Chest wall: Not tender to palpatation.   Cardiovascular:      PMI at left midclavicular line. Normal rate. Regular rhythm. Normal S1. Normal S2.       Murmurs: There is no murmur.      No gallop.  No click. No rub.   Pulses:     Intact distal pulses.   Edema:     Thigh: bilateral trace edema of the thigh.     Pretibial: bilateral trace edema of the pretibial area.     Ankle: bilateral trace edema of the ankle.     Feet: " bilateral trace edema of the feet.  Abdominal:      General: Bowel sounds are normal.      Palpations: Abdomen is soft.      Tenderness: There is no abdominal tenderness.   Musculoskeletal: Normal range of motion.         General: No tenderness. Skin:     General: Skin is warm and dry.   Neurological:      General: No focal deficit present.      Mental Status: Alert and oriented to person, place and time.       ECG 12 Lead    Date/Time: 8/28/2024 9:50 AM  Performed by: Ar Chicas Jr., MD    Authorized by: Ar Chicas Jr., MD  Comparison: compared with previous ECG from 4/12/2024  Similar to previous ECG  Rhythm: sinus rhythm    Clinical impression: normal ECG      Lipid Panel          5/1/2024    14:17 8/13/2024    11:51   Lipid Panel   Total Cholesterol 228  170    Triglycerides 227  256    HDL Cholesterol 48  34    VLDL Cholesterol 41  43    LDL Cholesterol  139  93         BUN   Date Value Ref Range Status   08/13/2024 11 6 - 20 mg/dL Final   04/12/2024 14 6 - 20 mg/dL Final   02/22/2023 12 7 - 19 mg/dL Final     Creatinine   Date Value Ref Range Status   08/13/2024 0.67 0.57 - 1.00 mg/dL Final   04/12/2024 0.64 0.57 - 1.00 mg/dL Final   02/22/2023 0.93 0.55 - 1.02 mg/dL Final     Potassium   Date Value Ref Range Status   08/13/2024 3.9 3.5 - 5.2 mmol/L Final   04/12/2024 3.8 3.5 - 5.2 mmol/L Final   02/22/2023 3.2 (L) 3.5 - 5.1 mmol/L Final     ALT (SGPT)   Date Value Ref Range Status   08/13/2024 15 0 - 32 IU/L Final   04/12/2024 14 1 - 33 U/L Final   02/22/2023 12 0 - 55 U/L Final     AST (SGOT)   Date Value Ref Range Status   08/13/2024 13 0 - 40 IU/L Final   04/12/2024 13 1 - 32 U/L Final   02/22/2023 11 5 - 34 U/L Final           ASSESSMENT:     39-year-old female presents for evaluation of hypertension      PLAN OF CARE:     Hypertension -seems fairly well-controlled on current regimen.  She seems to be tolerating nifedipine long-acting formulation twice daily fairly well.  We had a long discussion  about potential evaluation for sleep apnea and she will consider options.  We also had a long conversation about weight loss and she states that she has tried many different strategies and has been unsuccessful.  She will keep working with her PCP on diet and exercise as I think this gives her the greatest chance of reducing reliance on pharmacotherapy for blood pressure control. Twice daily blood pressure log to be sent in after 1 week.  Morbid obesity -as above    Return to clinic 6 months             Discharge Medications            Accurate as of August 28, 2024  9:49 AM. If you have any questions, ask your nurse or doctor.                Continue These Medications        Instructions Start Date   fenofibrate 145 MG tablet  Commonly known as: TRICOR   145 mg, Oral, Daily      labetalol 300 MG tablet  Commonly known as: NORMODYNE   300 mg, Oral, Every 8 Hours Scheduled      metFORMIN  MG 24 hr tablet  Commonly known as: GLUCOPHAGE-XR   500 mg, Oral, 2 Times Daily      Multivitamin Adult tablet tablet  Generic drug: multivitamin with minerals   1 tablet, Oral, Daily      NIFEdipine XL 90 MG 24 hr tablet  Commonly known as: PROCARDIA XL   90 mg, Oral, Every 12 Hours      omega-3 acid ethyl esters 1 g capsule  Commonly known as: LOVAZA       Prenatal 28-0.8 MG tablet   1 tablet, Oral, Daily               Thank you for allowing me to participate in the care of your patient,      Sincerely,   Ar Chicas MD  [facility]  08/28/24  09:49 EDT

## 2024-11-13 ENCOUNTER — OFFICE VISIT (OUTPATIENT)
Dept: FAMILY MEDICINE CLINIC | Facility: CLINIC | Age: 39
End: 2024-11-13
Payer: MEDICARE

## 2024-11-13 VITALS
HEART RATE: 80 BPM | WEIGHT: 252 LBS | OXYGEN SATURATION: 98 % | DIASTOLIC BLOOD PRESSURE: 84 MMHG | HEIGHT: 64 IN | TEMPERATURE: 98 F | BODY MASS INDEX: 43.02 KG/M2 | RESPIRATION RATE: 16 BRPM | SYSTOLIC BLOOD PRESSURE: 160 MMHG

## 2024-11-13 DIAGNOSIS — Z23 IMMUNIZATION DUE: ICD-10-CM

## 2024-11-13 DIAGNOSIS — E78.1 HYPERTRIGLYCERIDEMIA: Primary | ICD-10-CM

## 2024-11-13 DIAGNOSIS — E11.9 TYPE 2 DIABETES MELLITUS WITHOUT COMPLICATION, WITHOUT LONG-TERM CURRENT USE OF INSULIN: Chronic | ICD-10-CM

## 2024-11-13 DIAGNOSIS — I10 PRIMARY HYPERTENSION: Chronic | ICD-10-CM

## 2024-11-13 DIAGNOSIS — E66.813 CLASS 3 SEVERE OBESITY WITH SERIOUS COMORBIDITY AND BODY MASS INDEX (BMI) OF 40.0 TO 44.9 IN ADULT, UNSPECIFIED OBESITY TYPE: Chronic | ICD-10-CM

## 2024-11-13 DIAGNOSIS — E66.01 CLASS 3 SEVERE OBESITY WITH SERIOUS COMORBIDITY AND BODY MASS INDEX (BMI) OF 40.0 TO 44.9 IN ADULT, UNSPECIFIED OBESITY TYPE: Chronic | ICD-10-CM

## 2024-11-13 PROBLEM — O10.013 PRE-EXISTING ESSENTIAL HYPERTENSION DURING PREGNANCY IN THIRD TRIMESTER: Status: RESOLVED | Noted: 2024-03-05 | Resolved: 2024-11-13

## 2024-11-13 PROBLEM — O10.913 CHRONIC HYPERTENSION WITH EXACERBATION DURING PREGNANCY IN THIRD TRIMESTER: Status: RESOLVED | Noted: 2019-01-04 | Resolved: 2024-11-13

## 2024-11-13 PROBLEM — O09.43 GRAND MULTIPARITY WITH CURRENT PREGNANCY IN THIRD TRIMESTER: Status: RESOLVED | Noted: 2024-03-05 | Resolved: 2024-11-13

## 2024-11-13 PROBLEM — Z00.00 ANNUAL PHYSICAL EXAM: Status: RESOLVED | Noted: 2024-08-13 | Resolved: 2024-11-13

## 2024-11-13 PROCEDURE — 90656 IIV3 VACC NO PRSV 0.5 ML IM: CPT | Performed by: STUDENT IN AN ORGANIZED HEALTH CARE EDUCATION/TRAINING PROGRAM

## 2024-11-13 PROCEDURE — 1159F MED LIST DOCD IN RCRD: CPT | Performed by: STUDENT IN AN ORGANIZED HEALTH CARE EDUCATION/TRAINING PROGRAM

## 2024-11-13 PROCEDURE — 1126F AMNT PAIN NOTED NONE PRSNT: CPT | Performed by: STUDENT IN AN ORGANIZED HEALTH CARE EDUCATION/TRAINING PROGRAM

## 2024-11-13 PROCEDURE — 99214 OFFICE O/P EST MOD 30 MIN: CPT | Performed by: STUDENT IN AN ORGANIZED HEALTH CARE EDUCATION/TRAINING PROGRAM

## 2024-11-13 PROCEDURE — G0008 ADMIN INFLUENZA VIRUS VAC: HCPCS | Performed by: STUDENT IN AN ORGANIZED HEALTH CARE EDUCATION/TRAINING PROGRAM

## 2024-11-13 PROCEDURE — 3077F SYST BP >= 140 MM HG: CPT | Performed by: STUDENT IN AN ORGANIZED HEALTH CARE EDUCATION/TRAINING PROGRAM

## 2024-11-13 PROCEDURE — 1160F RVW MEDS BY RX/DR IN RCRD: CPT | Performed by: STUDENT IN AN ORGANIZED HEALTH CARE EDUCATION/TRAINING PROGRAM

## 2024-11-13 PROCEDURE — 3044F HG A1C LEVEL LT 7.0%: CPT | Performed by: STUDENT IN AN ORGANIZED HEALTH CARE EDUCATION/TRAINING PROGRAM

## 2024-11-13 PROCEDURE — 3079F DIAST BP 80-89 MM HG: CPT | Performed by: STUDENT IN AN ORGANIZED HEALTH CARE EDUCATION/TRAINING PROGRAM

## 2024-11-13 RX ORDER — FENOFIBRATE 145 MG/1
145 TABLET, COATED ORAL DAILY
Qty: 90 TABLET | Refills: 1 | Status: SHIPPED | OUTPATIENT
Start: 2024-11-13

## 2024-11-13 RX ORDER — ELECTROLYTES/DEXTROSE
1 SOLUTION, ORAL ORAL DAILY
Qty: 90 TABLET | Refills: 3 | Status: SHIPPED | OUTPATIENT
Start: 2024-11-13 | End: 2025-11-08

## 2024-11-13 RX ORDER — LABETALOL 300 MG/1
300 TABLET, FILM COATED ORAL EVERY 8 HOURS SCHEDULED
Qty: 270 TABLET | Refills: 1 | Status: SHIPPED | OUTPATIENT
Start: 2024-11-13

## 2024-11-13 RX ORDER — METFORMIN HYDROCHLORIDE 500 MG/1
500 TABLET, EXTENDED RELEASE ORAL 2 TIMES DAILY
Qty: 180 TABLET | Refills: 1 | Status: SHIPPED | OUTPATIENT
Start: 2024-11-13

## 2024-11-13 RX ORDER — NIFEDIPINE 90 MG/1
90 TABLET, EXTENDED RELEASE ORAL EVERY 12 HOURS
Qty: 180 TABLET | Refills: 1 | Status: SHIPPED | OUTPATIENT
Start: 2024-11-13

## 2024-11-13 RX ORDER — SWAB
1 SWAB, NON-MEDICATED MISCELLANEOUS DAILY
Qty: 90 EACH | Refills: 3 | Status: SHIPPED | OUTPATIENT
Start: 2024-11-13

## 2024-11-13 NOTE — ASSESSMENT & PLAN NOTE
Patient's (Body mass index is 43.23 kg/m².) indicates that they are morbidly/severely obese (BMI > 40 or > 35 with obesity - related health condition) with health conditions that include hypertension, diabetes mellitus, and dyslipidemias . Weight is newly identified. BMI  is above average; BMI management plan is completed. We discussed portion control and increasing exercise.     Discussed the importance of healthy diet, nutrition, and lifestyle. Recommend low salt, fat/cholesterol diet and avoid concentrated sweets. Encouraged DASH diet along with fresh fruits & vegetables and low fat dairy products. Counseled patient to exercise/walk as tolerated. Avoid tobacco and alcohol use.

## 2024-11-13 NOTE — ASSESSMENT & PLAN NOTE
Hypertension is stable and controlled  Continue current treatment regimen.  Dietary sodium restriction.  Weight loss.  Regular aerobic exercise.  Ambulatory blood pressure monitoring.  Blood pressure will be reassessed in 3 months.  Will continue Procardia extended release twice daily as it is all right with cardiology.  Counseled patient to complete a sleep study at some point.  Also counseled patient on weight loss which would help with any sleep apnea that might already been present.    Discussed the importance of healthy diet, nutrition, and lifestyle. Recommend low salt, fat/cholesterol diet and avoid concentrated sweets. Encouraged DASH diet along with fresh fruits & vegetables and low fat dairy products. Counseled patient to exercise/walk as tolerated. Avoid tobacco and alcohol use.

## 2024-11-13 NOTE — PROGRESS NOTES
"Chief Complaint  Hypertension (3 MONTH F/U) and Diabetes (3 MONTH F/U)    Subjective        Fany Brunson presents to [facility]  History of Present Illness  39-year-old white female with a history of hypertension, obesity, diabetes here for chronic disease management follow-up visit.    Patient reports her diabetes started during gestational as gestational diabetes.      Reviewed recent cardiology consult note and per cardiology it is all right to continue Procardia XR twice daily.  Discussed sleep study for further evaluation of possible sleep apnea as etiology of hypertension however patient refused sleep study at this time.    Patient states she has several children who keep her busy and she has an erratic sleep schedule.  Counseled patient on adequate sleep.  Instructed patient to follow-up with an eye doctor, either opthalmology or optometry with preference for opthalmology, for an annual diabetic eye exam.    Instructed patient to get the adult preventive immunization that are due at  the pharmacy, including - FLU, HBV .    Hypertension    Diabetes        Objective   Vital Signs:  /84 (BP Location: Left arm, Patient Position: Sitting, Cuff Size: Adult)   Pulse 80   Temp 98 °F (36.7 °C) (Temporal)   Resp 16   Ht 162.6 cm (64.02\")   Wt 114 kg (252 lb)   SpO2 98%   BMI 43.23 kg/m²   Estimated body mass index is 43.23 kg/m² as calculated from the following:    Height as of this encounter: 162.6 cm (64.02\").    Weight as of this encounter: 114 kg (252 lb).               Physical Exam  Constitutional:       Appearance: Normal appearance.   HENT:      Head: Normocephalic and atraumatic.   Eyes:      Conjunctiva/sclera: Conjunctivae normal.   Cardiovascular:      Rate and Rhythm: Normal rate and regular rhythm.      Heart sounds: Normal heart sounds.   Pulmonary:      Effort: Pulmonary effort is normal.      Breath sounds: Normal breath sounds.   Abdominal:      General: " Bowel sounds are normal.      Palpations: Abdomen is soft.      Comments: Non-tender   Skin:     General: Skin is warm.   Neurological:      General: No focal deficit present.      Mental Status: She is alert and oriented to person, place, and time.      Comments: Patient status post normal diabetic foot exam today.  Normal gross motor and sensory sensation bilateral feet.  Skin intact bilateral feet.  Normal monofilament test bilateral feet.  Normal pedal pulses bilaterally.  I used this opportunity to educate patient to check his legs and feet once daily for any cuts, skin breaks or any other lesions/inconsistencies; and to return to the healthcare provider immediately for further evaluation, patient was understanding.     Psychiatric:         Mood and Affect: Mood normal.         Behavior: Behavior normal.        Result Review :    The following data was reviewed by: DEAN Perez on 11/13/2024:  CMP          4/12/2024    06:00 5/1/2024    14:17 8/13/2024    11:51   CMP   Glucose 122  101  145    BUN 14  14  11    Creatinine 0.64  0.81  0.67    EGFR 115.5      Sodium 136  141  141    Potassium 3.8  4.4  3.9    Chloride 107  102  105    Calcium 8.9  9.7  9.3    Total Protein  7.0  6.8    Total Protein 6.7      Albumin 3.5  4.3  4.1    Globulin  2.7  2.7    Globulin 3.2      Total Bilirubin <0.2  0.3  <0.2    Alkaline Phosphatase 150  114  77    AST (SGOT) 13  22  13    ALT (SGPT) 14  21  15    Albumin/Globulin Ratio 1.1      BUN/Creatinine Ratio 21.9  17  16    Anion Gap 14.7        CBC          4/13/2024    05:55 5/1/2024    14:17 8/13/2024    11:51   CBC   WBC 15.01  9.4  9.2    RBC 4.01  4.72  4.77    Hemoglobin 11.1  13.2  13.0    Hematocrit 34.5  40.8  40.6    MCV 86.0  86  85    MCH 27.7  28.0  27.3    MCHC 32.2  32.4  32.0    RDW 13.7  13.0  13.5    Platelets 238  396  351      Lipid Panel          5/1/2024    14:17 8/13/2024    11:51   Lipid Panel   Total Cholesterol 228  170     Triglycerides 227  256    HDL Cholesterol 48  34    VLDL Cholesterol 41  43    LDL Cholesterol  139  93      TSH          5/1/2024    14:17   TSH   TSH 1.500      A1C Last 3 Results          2/20/2024    10:09 8/13/2024    11:51   HGBA1C Last 3 Results   Hemoglobin A1C 5.6  6.1      Microalbumin          8/13/2024    11:51   Microalbumin   Microalbumin, Urine 20.6        Data reviewed : Consultant notes reviewed last cardiology consult note with Dr. Billingsley from August 28, 2024, please see HPI for details.             Assessment and Plan     Diagnoses and all orders for this visit:    1. Hypertriglyceridemia (Primary)  Assessment & Plan:   Lipid abnormalities are stable    Plan:  Continue same medication/s without change.      Discussed medication dosage, use, side effects, and goals of treatment in detail.    Counseled patient on lifestyle modifications to help control hyperlipidemia.     Patient Treatment Goals:   LDL goal is less than 70    Followup in 6 months.  Discussed the importance of healthy diet, nutrition, and lifestyle. Recommend low salt, fat/cholesterol diet and avoid concentrated sweets. Encouraged DASH diet along with fresh fruits & vegetables and low fat dairy products. Counseled patient to exercise/walk as tolerated. Avoid tobacco and alcohol use.      Orders:  -     Lipid Panel  -     Comprehensive Metabolic Panel  -     fenofibrate (TRICOR) 145 MG tablet; Take 1 tablet by mouth Daily.  Dispense: 90 tablet; Refill: 1    2. Type 2 diabetes mellitus without complication, without long-term current use of insulin  Assessment & Plan:  Diabetes is improving with treatment.   Continue current treatment regimen.  Reminded to bring in blood sugar diary at next visit.  Recommended an ADA diet.  Recommended a Mediterranean style of eating  Regular aerobic exercise.  Discussed ways to avoid symptomatic hypoglycemia.  Discussed sick day management.  Discussed foot care.  Reminded to get yearly retinal  exam.  Diabetes will be reassessed in 3 months    Discussed diabetes management and need for adequate BS control. Educated patient high A1c puts him/her at risk for MI, CVA, CKD, gastroparesis, foot ulcers, and frequent infections.   Pt informed of Rx for glucometer/test strips, and lancets as needed and explained to keep a blood sugar log. Return to office as instructed. Additionally diet compliance explained - avoid sugar candy, soda, high carbohydrate loads. Explained how losing weight can improve your health and achieve better blood sugar control. Being active can also help prevent or control the disorder. Recommended annual opthalmology follow -up due to diagnosis of diabetes. Annual Podiatry visit prn.      Orders:  -     metFORMIN ER (GLUCOPHAGE-XR) 500 MG 24 hr tablet; Take 1 tablet by mouth 2 (Two) Times a Day.  Dispense: 180 tablet; Refill: 1  -     CBC & Differential  -     Urinalysis With Microscopic - Urine, Clean Catch  -     TSH Rfx On Abnormal To Free T4  -     Hemoglobin A1c  -     Microalbumin / Creatinine Urine Ratio - Urine, Clean Catch    3. Primary hypertension  Assessment & Plan:  Hypertension is stable and controlled  Continue current treatment regimen.  Dietary sodium restriction.  Weight loss.  Regular aerobic exercise.  Ambulatory blood pressure monitoring.  Blood pressure will be reassessed in 3 months.  Will continue Procardia extended release twice daily as it is all right with cardiology.  Counseled patient to complete a sleep study at some point.  Also counseled patient on weight loss which would help with any sleep apnea that might already been present.    Discussed the importance of healthy diet, nutrition, and lifestyle. Recommend low salt, fat/cholesterol diet and avoid concentrated sweets. Encouraged DASH diet along with fresh fruits & vegetables and low fat dairy products. Counseled patient to exercise/walk as tolerated. Avoid tobacco and alcohol use.      Orders:  -      labetalol (NORMODYNE) 300 MG tablet; Take 1 tablet by mouth Every 8 (Eight) Hours.  Dispense: 270 tablet; Refill: 1  -     NIFEdipine XL (PROCARDIA XL) 90 MG 24 hr tablet; Take 1 tablet by mouth Every 12 (Twelve) Hours.  Dispense: 180 tablet; Refill: 1  -     CBC & Differential  -     Urinalysis With Microscopic - Urine, Clean Catch  -     TSH Rfx On Abnormal To Free T4  -     Microalbumin / Creatinine Urine Ratio - Urine, Clean Catch    4. Immunization due  Assessment & Plan:  Patient consented to receive a flu shot here at the clinic today.    Orders:  -     Fluzone >6mos ([id number])    5. Class 3 severe obesity with serious comorbidity and body mass index (BMI) of 40.0 to 44.9 in adult, unspecified obesity type  Assessment & Plan:  Patient's (Body mass index is 43.23 kg/m².) indicates that they are morbidly/severely obese (BMI > 40 or > 35 with obesity - related health condition) with health conditions that include hypertension, diabetes mellitus, and dyslipidemias . Weight is newly identified. BMI  is above average; BMI management plan is completed. We discussed portion control and increasing exercise.     Discussed the importance of healthy diet, nutrition, and lifestyle. Recommend low salt, fat/cholesterol diet and avoid concentrated sweets. Encouraged DASH diet along with fresh fruits & vegetables and low fat dairy products. Counseled patient to exercise/walk as tolerated. Avoid tobacco and alcohol use.        Other orders  -     multivitamin with minerals (Multivitamin Adult) tablet tablet; Take 1 tablet by mouth Daily for 360 days.  Dispense: 90 tablet; Refill: 3  -     Prenatal 28-0.8 MG tablet; Take 1 tablet by mouth Daily.  Dispense: 90 each; Refill: 3        All chronic conditions have been addressed and treated by the practice or other specialists. Medications have been reconciled and refilled as appropriate. Reiterated compliance and timely follow up appointments. Side effects of all new and old  medications reviewed with the patient and patient willing to accept all risks involved. Advised RTO if no improvement or worsening of symptoms or if any new complaints arise. Patient advised to follow up with clinic or call after diagnostic tests, if patient does not hear from office 3 days after the test completion.          Follow Up     Return in about 3 months (around 2/13/2025) for Next scheduled follow up.  Patient was given instructions and counseling regarding her condition or for health maintenance advice. Please see specific information pulled into the AVS if appropriate.

## 2024-11-14 LAB
ALBUMIN SERPL-MCNC: 4.1 G/DL (ref 3.9–4.9)
ALBUMIN/CREAT UR: 120 MG/G CREAT (ref 0–29)
ALP SERPL-CCNC: 90 IU/L (ref 44–121)
ALT SERPL-CCNC: 19 IU/L (ref 0–32)
APPEARANCE UR: ABNORMAL
AST SERPL-CCNC: 17 IU/L (ref 0–40)
BACTERIA #/AREA URNS HPF: ABNORMAL /[HPF]
BASOPHILS # BLD AUTO: 0.1 X10E3/UL (ref 0–0.2)
BASOPHILS NFR BLD AUTO: 1 %
BILIRUB SERPL-MCNC: <0.2 MG/DL (ref 0–1.2)
BILIRUB UR QL STRIP: NEGATIVE
BUN SERPL-MCNC: 11 MG/DL (ref 6–20)
BUN/CREAT SERPL: 15 (ref 9–23)
CALCIUM SERPL-MCNC: 9.2 MG/DL (ref 8.7–10.2)
CASTS URNS QL MICRO: ABNORMAL /LPF
CHLORIDE SERPL-SCNC: 104 MMOL/L (ref 96–106)
CHOLEST SERPL-MCNC: 175 MG/DL (ref 100–199)
CO2 SERPL-SCNC: 22 MMOL/L (ref 20–29)
COLOR UR: YELLOW
CREAT SERPL-MCNC: 0.74 MG/DL (ref 0.57–1)
CREAT UR-MCNC: 36.4 MG/DL
EGFRCR SERPLBLD CKD-EPI 2021: 105 ML/MIN/1.73
EOSINOPHIL # BLD AUTO: 0.5 X10E3/UL (ref 0–0.4)
EOSINOPHIL NFR BLD AUTO: 5 %
EPI CELLS #/AREA URNS HPF: >10 /HPF (ref 0–10)
ERYTHROCYTE [DISTWIDTH] IN BLOOD BY AUTOMATED COUNT: 14.3 % (ref 11.7–15.4)
GLOBULIN SER CALC-MCNC: 2.5 G/DL (ref 1.5–4.5)
GLUCOSE SERPL-MCNC: 162 MG/DL (ref 70–99)
GLUCOSE UR QL STRIP: ABNORMAL
HBA1C MFR BLD: 7 % (ref 4.8–5.6)
HCT VFR BLD AUTO: 40.3 % (ref 34–46.6)
HDLC SERPL-MCNC: 36 MG/DL
HGB BLD-MCNC: 13.1 G/DL (ref 11.1–15.9)
HGB UR QL STRIP: NEGATIVE
IMM GRANULOCYTES # BLD AUTO: 0.1 X10E3/UL (ref 0–0.1)
IMM GRANULOCYTES NFR BLD AUTO: 1 %
KETONES UR QL STRIP: NEGATIVE
LDLC SERPL CALC-MCNC: 97 MG/DL (ref 0–99)
LEUKOCYTE ESTERASE UR QL STRIP: ABNORMAL
LYMPHOCYTES # BLD AUTO: 3.8 X10E3/UL (ref 0.7–3.1)
LYMPHOCYTES NFR BLD AUTO: 35 %
MCH RBC QN AUTO: 26.6 PG (ref 26.6–33)
MCHC RBC AUTO-ENTMCNC: 32.5 G/DL (ref 31.5–35.7)
MCV RBC AUTO: 82 FL (ref 79–97)
MICRO URNS: ABNORMAL
MICROALBUMIN UR-MCNC: 43.5 UG/ML
MONOCYTES # BLD AUTO: 0.5 X10E3/UL (ref 0.1–0.9)
MONOCYTES NFR BLD AUTO: 5 %
NEUTROPHILS # BLD AUTO: 6.1 X10E3/UL (ref 1.4–7)
NEUTROPHILS NFR BLD AUTO: 53 %
NITRITE UR QL STRIP: NEGATIVE
PH UR STRIP: 7 [PH] (ref 5–7.5)
PLATELET # BLD AUTO: 356 X10E3/UL (ref 150–450)
POTASSIUM SERPL-SCNC: 3.8 MMOL/L (ref 3.5–5.2)
PROT SERPL-MCNC: 6.6 G/DL (ref 6–8.5)
PROT UR QL STRIP: ABNORMAL
RBC # BLD AUTO: 4.93 X10E6/UL (ref 3.77–5.28)
RBC #/AREA URNS HPF: ABNORMAL /HPF (ref 0–2)
SODIUM SERPL-SCNC: 141 MMOL/L (ref 134–144)
SP GR UR STRIP: 1.01 (ref 1–1.03)
TRIGL SERPL-MCNC: 246 MG/DL (ref 0–149)
TSH SERPL DL<=0.005 MIU/L-ACNC: 3.79 UIU/ML (ref 0.45–4.5)
UROBILINOGEN UR STRIP-MCNC: 0.2 MG/DL (ref 0.2–1)
VLDLC SERPL CALC-MCNC: 42 MG/DL (ref 5–40)
WBC # BLD AUTO: 11.1 X10E3/UL (ref 3.4–10.8)
WBC #/AREA URNS HPF: ABNORMAL /HPF (ref 0–5)

## 2024-11-15 DIAGNOSIS — E11.9 TYPE 2 DIABETES MELLITUS WITHOUT COMPLICATION, WITHOUT LONG-TERM CURRENT USE OF INSULIN: ICD-10-CM

## 2024-11-15 DIAGNOSIS — R80.9 MICROALBUMINURIA: ICD-10-CM

## 2024-11-15 DIAGNOSIS — N30.00 ACUTE CYSTITIS WITHOUT HEMATURIA: Primary | ICD-10-CM

## 2024-11-15 RX ORDER — NITROFURANTOIN 25; 75 MG/1; MG/1
100 CAPSULE ORAL 2 TIMES DAILY
Qty: 10 CAPSULE | Refills: 0 | Status: SHIPPED | OUTPATIENT
Start: 2024-11-15

## 2024-11-25 DIAGNOSIS — I10 PRIMARY HYPERTENSION: Chronic | ICD-10-CM

## 2024-11-25 RX ORDER — NIFEDIPINE 90 MG/1
90 TABLET, EXTENDED RELEASE ORAL EVERY 12 HOURS
Qty: 180 TABLET | Refills: 1 | OUTPATIENT
Start: 2024-11-25

## 2024-12-11 DIAGNOSIS — E11.9 TYPE 2 DIABETES MELLITUS WITHOUT COMPLICATION, WITHOUT LONG-TERM CURRENT USE OF INSULIN: ICD-10-CM

## 2024-12-11 RX ORDER — EMPAGLIFLOZIN 10 MG/1
10 TABLET, FILM COATED ORAL DAILY
Qty: 30 TABLET | Refills: 3 | Status: SHIPPED | OUTPATIENT
Start: 2024-12-11

## (undated) DEVICE — ADHS SKIN PREMIERPRO EXOFIN TOPICAL HI/VISC .5ML

## (undated) DEVICE — SUT MNCRYL 3/0 CT1 36 IN Y944H

## (undated) DEVICE — 3M™ STERI-STRIP™ COMPOUND BENZOIN TINCTURE 40 BAGS/CARTON 4 CARTONS/CASE C1544: Brand: 3M™ STERI-STRIP™

## (undated) DEVICE — 3M(TM) TEGADERM(TM) TRANSPARENT FILM DRESSING FRAME STYLE 1627: Brand: 3M™ TEGADERM™

## (undated) DEVICE — SUT PLAIN  3/0 CT1 27IN 842H

## (undated) DEVICE — SUT MNCRYL 0/0 CTX 36IN Y398H

## (undated) DEVICE — SOL IRR H2O BTL 1000ML STRL

## (undated) DEVICE — GLV SURG BIOGEL LTX PF 7 1/2

## (undated) DEVICE — SUT VIC 0 CT1 36IN J946H

## (undated) DEVICE — 3M™ STERI-STRIP™ REINFORCED ADHESIVE SKIN CLOSURES, R1547, 1/2 IN X 4 IN (12 MM X 100 MM), 6 STRIPS/ENVELOPE: Brand: 3M™ STERI-STRIP™

## (undated) DEVICE — SUT PDS 1 CT1 36IN Z347H

## (undated) DEVICE — UNDYED BRAIDED (POLYGLACTIN 910), SYNTHETIC ABSORBABLE SUTURE: Brand: COATED VICRYL

## (undated) DEVICE — ANTIBACTERIAL UNDYED BRAIDED (POLYGLACTIN 910), SYNTHETIC ABSORBABLE SUTURE: Brand: COATED VICRYL

## (undated) DEVICE — GLV SURG BIOGEL LTX PF 7